# Patient Record
Sex: MALE | Race: WHITE | NOT HISPANIC OR LATINO | Employment: FULL TIME | URBAN - METROPOLITAN AREA
[De-identification: names, ages, dates, MRNs, and addresses within clinical notes are randomized per-mention and may not be internally consistent; named-entity substitution may affect disease eponyms.]

---

## 2017-02-02 ENCOUNTER — OFFICE VISIT (OUTPATIENT)
Dept: INTERNAL MEDICINE | Facility: CLINIC | Age: 57
End: 2017-02-02
Payer: COMMERCIAL

## 2017-02-02 ENCOUNTER — LAB VISIT (OUTPATIENT)
Dept: LAB | Facility: HOSPITAL | Age: 57
End: 2017-02-02
Attending: FAMILY MEDICINE
Payer: COMMERCIAL

## 2017-02-02 VITALS
TEMPERATURE: 98 F | HEIGHT: 67 IN | BODY MASS INDEX: 26.37 KG/M2 | WEIGHT: 168 LBS | HEART RATE: 67 BPM | OXYGEN SATURATION: 98 % | SYSTOLIC BLOOD PRESSURE: 140 MMHG | DIASTOLIC BLOOD PRESSURE: 80 MMHG

## 2017-02-02 DIAGNOSIS — I10 UNSPECIFIED ESSENTIAL HYPERTENSION: ICD-10-CM

## 2017-02-02 DIAGNOSIS — I10 HYPERTENSION, ESSENTIAL: Primary | ICD-10-CM

## 2017-02-02 DIAGNOSIS — R97.20 RISING PSA LEVEL: ICD-10-CM

## 2017-02-02 DIAGNOSIS — I10 HYPERTENSION, ESSENTIAL: ICD-10-CM

## 2017-02-02 DIAGNOSIS — F17.200 SMOKER: ICD-10-CM

## 2017-02-02 DIAGNOSIS — E78.5 HYPERLIPIDEMIA, UNSPECIFIED HYPERLIPIDEMIA TYPE: ICD-10-CM

## 2017-02-02 DIAGNOSIS — Z12.5 PROSTATE CANCER SCREENING: ICD-10-CM

## 2017-02-02 LAB
ALBUMIN SERPL BCP-MCNC: 3.9 G/DL
ALP SERPL-CCNC: 86 U/L
ALT SERPL W/O P-5'-P-CCNC: 23 U/L
ANION GAP SERPL CALC-SCNC: 8 MMOL/L
AST SERPL-CCNC: 21 U/L
BILIRUB SERPL-MCNC: 1.9 MG/DL
BUN SERPL-MCNC: 13 MG/DL
CALCIUM SERPL-MCNC: 9.2 MG/DL
CHLORIDE SERPL-SCNC: 102 MMOL/L
CHOLEST/HDLC SERPL: 1.8 {RATIO}
CO2 SERPL-SCNC: 26 MMOL/L
COMPLEXED PSA SERPL-MCNC: 0.83 NG/ML
CREAT SERPL-MCNC: 0.9 MG/DL
EST. GFR  (AFRICAN AMERICAN): >60 ML/MIN/1.73 M^2
EST. GFR  (NON AFRICAN AMERICAN): >60 ML/MIN/1.73 M^2
GLUCOSE SERPL-MCNC: 83 MG/DL
HDL/CHOLESTEROL RATIO: 56.3 %
HDLC SERPL-MCNC: 142 MG/DL
HDLC SERPL-MCNC: 80 MG/DL
LDLC SERPL CALC-MCNC: 55 MG/DL
NONHDLC SERPL-MCNC: 62 MG/DL
POTASSIUM SERPL-SCNC: 4.3 MMOL/L
PROT SERPL-MCNC: 6.9 G/DL
SODIUM SERPL-SCNC: 136 MMOL/L
TRIGL SERPL-MCNC: 35 MG/DL

## 2017-02-02 PROCEDURE — 80061 LIPID PANEL: CPT

## 2017-02-02 PROCEDURE — 99214 OFFICE O/P EST MOD 30 MIN: CPT | Mod: S$GLB,,, | Performed by: FAMILY MEDICINE

## 2017-02-02 PROCEDURE — 84153 ASSAY OF PSA TOTAL: CPT

## 2017-02-02 PROCEDURE — 3077F SYST BP >= 140 MM HG: CPT | Mod: S$GLB,,, | Performed by: FAMILY MEDICINE

## 2017-02-02 PROCEDURE — 80053 COMPREHEN METABOLIC PANEL: CPT

## 2017-02-02 PROCEDURE — 36415 COLL VENOUS BLD VENIPUNCTURE: CPT

## 2017-02-02 PROCEDURE — 3079F DIAST BP 80-89 MM HG: CPT | Mod: S$GLB,,, | Performed by: FAMILY MEDICINE

## 2017-02-02 PROCEDURE — 99999 PR PBB SHADOW E&M-EST. PATIENT-LVL III: CPT | Mod: PBBFAC,,, | Performed by: FAMILY MEDICINE

## 2017-02-02 RX ORDER — HYDROCHLOROTHIAZIDE 25 MG/1
25 TABLET ORAL DAILY
Qty: 90 TABLET | Refills: 1 | Status: SHIPPED | OUTPATIENT
Start: 2017-02-02 | End: 2017-04-25 | Stop reason: SDUPTHER

## 2017-02-02 RX ORDER — AMLODIPINE AND BENAZEPRIL HYDROCHLORIDE 5; 20 MG/1; MG/1
2 CAPSULE ORAL DAILY
Qty: 180 CAPSULE | Refills: 3 | Status: SHIPPED | OUTPATIENT
Start: 2017-02-02 | End: 2017-02-06

## 2017-02-02 RX ORDER — HYDROCHLOROTHIAZIDE 12.5 MG/1
12.5 CAPSULE ORAL DAILY
COMMUNITY
End: 2017-02-02

## 2017-02-02 NOTE — MR AVS SNAPSHOT
[facility]  [address]  [place] [address]  Phone: [phone]  Fax: [phone]                  Kip Lauren   2017 4:00 PM   Office Visit    Description:  Male : 1960   Provider:  Romulo Rees MD   Department:  [facility]           Reason for Visit     Follow-up           Diagnoses this Visit        Comments    Hypertension, essential    -  Primary     Hyperlipidemia, unspecified hyperlipidemia type         Prostate cancer screening         Rising PSA level         Smoker         Unspecified essential hypertension                To Do List           Goals (5 Years of Data)     None      Follow-Up and Disposition     Return in about 1 month (around 3/2/2017) for lab review (after future labs), Blood Pressure recheck.       These Medications        Disp Refills Start End    amlodipine-benazepril 5-20 mg (LOTREL) 5-20 mg per capsule 180 capsule 3 2017     Take 2 capsules by mouth once daily. - Oral    Pharmacy: [facility] [address] [place] [address] [place] - [address] Ph #: [phone]       hydrochlorothiazide (HYDRODIURIL) 25 MG tablet 90 tablet 1 2017     Take 1 tablet (25 mg total) by mouth once daily. - Oral    Pharmacy: [facility] [address] [place] [address] [place] - [address] [facility] Ph #: [phone]         [facility]     [facility] On Call Nurse Care Line -  Assistance  Registered nurses in the [facility] On Call Center provide clinical advisement, health education, appointment booking, and other advisory services.  Call for this free service at [phone].             Medications           Message regarding Medications     Verify the changes and/or additions to your medication regime listed below are the same as discussed with your clinician today.  If any of these changes or additions are incorrect, please notify your healthcare provider.        START taking these NEW medications        Refills     "hydrochlorothiazide (HYDRODIURIL) 25 MG tablet 1    Sig: Take 1 tablet (25 mg total) by mouth once daily.    Class: Normal    Route: Oral      STOP taking these medications     hydrochlorothiazide (MICROZIDE) 12.5 mg capsule Take 12.5 mg by mouth once daily.           Verify that the below list of medications is an accurate representation of the medications you are currently taking.  If none reported, the list may be blank. If incorrect, please contact your healthcare provider. Carry this list with you in case of emergency.           Current Medications     amlodipine-benazepril 5-20 mg (LOTREL) 5-20 mg per capsule Take 2 capsules by mouth once daily.    atorvastatin (LIPITOR) 10 MG tablet Take 1 tablet (10 mg total) by mouth once daily.    hydrochlorothiazide (HYDRODIURIL) 25 MG tablet Take 1 tablet (25 mg total) by mouth once daily.           Clinical Reference Information           Your Vitals Were     BP Pulse Temp Height Weight SpO2    140/80 67 98.2 °F (36.8 °C) 5' 7" (1.702 m) 76.2 kg (167 lb 15.9 oz) 98%    BMI                26.31 kg/m2          Blood Pressure          Most Recent Value    BP  (!)  140/80      Allergies as of 2/2/2017     No Known Allergies      Immunizations Administered on Date of Encounter - 2/2/2017     None      Orders Placed During Today's Visit      Normal Orders This Visit    Ambulatory referral to Smoking Cessation Program     Future Labs/Procedures Expected by Expires    Comprehensive metabolic panel  2/2/2017 2/2/2018    Lipid panel  2/2/2017 2/2/2018    PSA, Screening  2/2/2017 2/2/2018      Smoking Cessation     If you would like to quit smoking:   You may be eligible for free services if you are a [place] resident and started smoking cigarettes before September 1, 1988.  Call the Smoking Cessation Trust (SCT) toll free at (645) 150-0862 or (362) 048-8382.   Call 3-800-QUIT-NOW if you do not meet the above criteria.            Language Assistance Services     ATTENTION: " Language assistance services are available, free of charge. Please call [phone].      ATENCIÓN: Si habla andrewsañol, tiene a lassiter disposición servicios gratuitos de asistencia lingüística. Llame al [phone].     CHÚ Ý: N?u b?n nói Ti?ng Vi?t, có các d?ch v? h? tr? ngôn ng? mi?n phí dành cho b?n. G?i s? [phone].         [facility] complies with applicable Federal civil rights laws and does not discriminate on the basis of race, color, national origin, age, disability, or sex.

## 2017-02-02 NOTE — PROGRESS NOTES
Subjective:       Patient ID: Kip Lauren is a 56 y.o. male.    Chief Complaint: Follow-up    HPI  Review of Systems   Constitutional: Negative for chills, fatigue and fever.   HENT: Negative for congestion and trouble swallowing.    Eyes: Negative for redness.   Respiratory: Negative for cough, chest tightness and shortness of breath.    Cardiovascular: Negative for chest pain, palpitations and leg swelling.   Gastrointestinal: Negative for abdominal pain and blood in stool.   Genitourinary: Negative for hematuria.   Musculoskeletal: Negative for arthralgias, back pain, gait problem, joint swelling, myalgias and neck pain.   Skin: Negative for color change and rash.   Neurological: Positive for headaches. Negative for tremors, speech difficulty, weakness and numbness.   Hematological: Negative for adenopathy. Does not bruise/bleed easily.   Psychiatric/Behavioral: Negative for behavioral problems, confusion and sleep disturbance. The patient is not nervous/anxious.        Objective:      Physical Exam   Constitutional: He is oriented to person, place, and time. He appears well-developed and well-nourished.   Eyes: No scleral icterus.   Neck: Normal range of motion. Neck supple. No JVD present. Carotid bruit is not present. No tracheal deviation present. No thyromegaly present.   Cardiovascular: Normal rate, regular rhythm, normal heart sounds and intact distal pulses.  Exam reveals no gallop and no friction rub.    No murmur heard.  Pulmonary/Chest: Effort normal. No respiratory distress. He has decreased breath sounds. He has no wheezes. He has no rales.   Abdominal: Soft. Bowel sounds are normal. He exhibits no distension and no mass. There is no tenderness. There is no rebound and no guarding.   Musculoskeletal: He exhibits no edema.   Lymphadenopathy:     He has no cervical adenopathy.   Neurological: He is alert and oriented to person, place, and time. He displays no tremor. No cranial nerve deficit.  Coordination and gait normal.   Skin: Skin is warm and dry. No rash noted. He is not diaphoretic. No erythema.   Psychiatric: He has a normal mood and affect. His behavior is normal. Judgment and thought content normal.   Nursing note and vitals reviewed.      Assessment:       1. Hypertension, essential    2. Hyperlipidemia, unspecified hyperlipidemia type    3. Prostate cancer screening    4. Rising PSA level    5. Smoker    6. Unspecified essential hypertension        Plan:   Kip was seen today for follow-up.    Diagnoses and all orders for this visit:    Hypertension, essential  -     Comprehensive metabolic panel; Future    Hyperlipidemia, unspecified hyperlipidemia type  -     Lipid panel; Future    Prostate cancer screening  -     PSA, Screening; Future    Rising PSA level  -     PSA, Screening; Future    Smoker  -     Ambulatory referral to Smoking Cessation Program    Unspecified essential hypertension  -     amlodipine-benazepril 5-20 mg (LOTREL) 5-20 mg per capsule; Take 2 capsules by mouth once daily.    Other orders  -     hydrochlorothiazide (HYDRODIURIL) 25 MG tablet; Take 1 tablet (25 mg total) by mouth once daily.      See meds, orders, follow up, routing and instructions sections of encounter.  A 56-year-old blood pressure followup.    He is lost to follow up.  His insurance changed.  He has not seen us in a year.    He likewise did not followup concerning his elevated PSA after seeing the   urologist he was referred to last year.    His new provider placed him on amlodipine/benazepril 5/40.  He developed   headaches.  He did not tolerate the medication well.    We will return him to his prior medications with us.  Smoking cessation.    Recheck PSA.  Consider Urology consult.  Follow up with me in a few weeks for BP   recheck.  The patient agreed to come back.      MARKELL/MARU  dd: 02/02/2017 18:06:01 (CST)  td: 02/03/2017 03:56:53 (CST)  Doc ID   #[id number]  Job ID #[id number]    CC:

## 2017-02-06 ENCOUNTER — TELEPHONE (OUTPATIENT)
Dept: INTERNAL MEDICINE | Facility: CLINIC | Age: 57
End: 2017-02-06

## 2017-02-06 RX ORDER — AMLODIPINE BESYLATE 10 MG/1
10 TABLET ORAL DAILY
Qty: 90 TABLET | Refills: 0 | Status: SHIPPED | OUTPATIENT
Start: 2017-02-06 | End: 2017-04-25 | Stop reason: SDUPTHER

## 2017-02-06 RX ORDER — BENAZEPRIL HYDROCHLORIDE 40 MG/1
40 TABLET ORAL DAILY
Qty: 90 TABLET | Refills: 0 | Status: SHIPPED | OUTPATIENT
Start: 2017-02-06 | End: 2017-04-25 | Stop reason: SDUPTHER

## 2017-02-06 NOTE — TELEPHONE ENCOUNTER
----- Message from Yana Gillis sent at 2/6/2017  8:12 AM CST -----  Contact: Mrs. Lauren   The pharmacy will not fill the amlodipine-benazepril 5/20, 2 cap b.i.d. Until the doctor sends an explantation to the insurance stating why he is being changed from the 5/20 1 q.d. To 5/20 2 cap b.i.d.     You can reach Mrs. Lauren at [phone].    Thanks!

## 2017-02-06 NOTE — TELEPHONE ENCOUNTER
Spoke with Hawa at the pharmacy advised per Dr. Rees's notes to DC AMLODIPINE/BENAZEPRIL COMBO.  Change to Amlodipine 10mg QD and Benazepril 40mg QD #30 1 refill on each.

## 2017-04-20 ENCOUNTER — TELEPHONE (OUTPATIENT)
Dept: INTERNAL MEDICINE | Facility: CLINIC | Age: 57
End: 2017-04-20

## 2017-04-20 DIAGNOSIS — I10 HYPERTENSION, ESSENTIAL: ICD-10-CM

## 2017-04-20 DIAGNOSIS — Z00.00 ANNUAL PHYSICAL EXAM: Primary | ICD-10-CM

## 2017-04-20 DIAGNOSIS — E78.5 HYPERLIPIDEMIA, UNSPECIFIED HYPERLIPIDEMIA TYPE: ICD-10-CM

## 2017-04-20 DIAGNOSIS — Z12.5 PROSTATE CANCER SCREENING: ICD-10-CM

## 2017-04-20 NOTE — TELEPHONE ENCOUNTER
----- Message from Tete Brand sent at 4/20/2017  9:15 AM CDT -----  Pt missed his appt for his labs in February.  Would like to reschedule them.  Please call

## 2017-04-25 ENCOUNTER — OFFICE VISIT (OUTPATIENT)
Dept: INTERNAL MEDICINE | Facility: CLINIC | Age: 57
End: 2017-04-25
Payer: COMMERCIAL

## 2017-04-25 VITALS
OXYGEN SATURATION: 98 % | WEIGHT: 167.56 LBS | BODY MASS INDEX: 26.3 KG/M2 | SYSTOLIC BLOOD PRESSURE: 120 MMHG | HEART RATE: 85 BPM | HEIGHT: 67 IN | DIASTOLIC BLOOD PRESSURE: 72 MMHG

## 2017-04-25 DIAGNOSIS — I10 HYPERTENSION, ESSENTIAL: Primary | ICD-10-CM

## 2017-04-25 DIAGNOSIS — F17.200 SMOKER: ICD-10-CM

## 2017-04-25 DIAGNOSIS — E78.5 HYPERLIPIDEMIA, UNSPECIFIED HYPERLIPIDEMIA TYPE: ICD-10-CM

## 2017-04-25 DIAGNOSIS — K63.5 POLYP OF COLON, UNSPECIFIED PART OF COLON, UNSPECIFIED TYPE: ICD-10-CM

## 2017-04-25 PROCEDURE — 1160F RVW MEDS BY RX/DR IN RCRD: CPT | Mod: S$GLB,,, | Performed by: FAMILY MEDICINE

## 2017-04-25 PROCEDURE — 99214 OFFICE O/P EST MOD 30 MIN: CPT | Mod: S$GLB,,, | Performed by: FAMILY MEDICINE

## 2017-04-25 PROCEDURE — 3078F DIAST BP <80 MM HG: CPT | Mod: S$GLB,,, | Performed by: FAMILY MEDICINE

## 2017-04-25 PROCEDURE — 3074F SYST BP LT 130 MM HG: CPT | Mod: S$GLB,,, | Performed by: FAMILY MEDICINE

## 2017-04-25 PROCEDURE — 99999 PR PBB SHADOW E&M-EST. PATIENT-LVL III: CPT | Mod: PBBFAC,,, | Performed by: FAMILY MEDICINE

## 2017-04-25 RX ORDER — ATORVASTATIN CALCIUM 10 MG/1
10 TABLET, FILM COATED ORAL DAILY
Qty: 90 TABLET | Refills: 3 | Status: SHIPPED | OUTPATIENT
Start: 2017-04-25 | End: 2017-11-08 | Stop reason: SDUPTHER

## 2017-04-25 RX ORDER — HYDROCHLOROTHIAZIDE 25 MG/1
25 TABLET ORAL DAILY
Qty: 90 TABLET | Refills: 3 | Status: SHIPPED | OUTPATIENT
Start: 2017-04-25 | End: 2017-11-08 | Stop reason: SDUPTHER

## 2017-04-25 RX ORDER — BENAZEPRIL HYDROCHLORIDE 40 MG/1
40 TABLET ORAL DAILY
Qty: 90 TABLET | Refills: 3 | Status: SHIPPED | OUTPATIENT
Start: 2017-04-25 | End: 2017-11-08 | Stop reason: SDUPTHER

## 2017-04-25 RX ORDER — AMLODIPINE BESYLATE 10 MG/1
10 TABLET ORAL DAILY
Qty: 90 TABLET | Refills: 3 | Status: SHIPPED | OUTPATIENT
Start: 2017-04-25 | End: 2017-11-08 | Stop reason: SDUPTHER

## 2017-04-25 NOTE — MR AVS SNAPSHOT
[facility]  [address]  [facility] [address]  Phone: [phone]  Fax: [phone]                  Kip Lauren   2017 10:40 AM   Office Visit    Description:  Male : 1960   Provider:  Romulo Rees MD   Department:  [facility] - Internal Medicine           Reason for Visit     Follow-up           Diagnoses this Visit        Comments    Hypertension, essential    -  Primary     Hyperlipidemia, unspecified hyperlipidemia type         Smoker         Polyp of colon, unspecified part of colon, unspecified type                To Do List           Goals (5 Years of Data)     None      Follow-Up and Disposition     Return in about 9 months (around 2018) for annual physical exam, Call or message through Portal for status update..       These Medications        Disp Refills Start End    amlodipine (NORVASC) 10 MG tablet 90 tablet 3 2017     Take 1 tablet (10 mg total) by mouth once daily. - Oral    Pharmacy: [facility] [address] [place] [address] [address] Ph #: [phone]       atorvastatin (LIPITOR) 10 MG tablet 90 tablet 3 2017     Take 1 tablet (10 mg total) by mouth once daily. - Oral    Pharmacy: [facility] [address] [place] [address] [place] - [address] [facility] Ph #: [phone]       benazepril (LOTENSIN) 40 MG tablet 90 tablet 3 2017     Take 1 tablet (40 mg total) by mouth once daily. - Oral    Pharmacy: [facility] [address] [place] [address] [place] - [address] [place] [address] Ph #: [phone]       hydrochlorothiazide (HYDRODIURIL) 25 MG tablet 90 tablet 3 2017     Take 1 tablet (25 mg total) by mouth once daily. - Oral    Pharmacy: [address] [place] - [address] [facility] Ph #: [phone]         [facility]     [facility] On Call Nurse Care Line - 24/ Assistance  Unless otherwise directed by your provider, please contact [facility], our nurse care line that is available for 24/7 assistance.  "    Registered nurses in the [facility] On Call Center provide: appointment scheduling, clinical advisement, health education, and other advisory services.  Call: [phone] (toll free)               Medications           Message regarding Medications     Verify the changes and/or additions to your medication regime listed below are the same as discussed with your clinician today.  If any of these changes or additions are incorrect, please notify your healthcare provider.             Verify that the below list of medications is an accurate representation of the medications you are currently taking.  If none reported, the list may be blank. If incorrect, please contact your healthcare provider. Carry this list with you in case of emergency.           Current Medications     amlodipine (NORVASC) 10 MG tablet Take 1 tablet (10 mg total) by mouth once daily.    atorvastatin (LIPITOR) 10 MG tablet Take 1 tablet (10 mg total) by mouth once daily.    benazepril (LOTENSIN) 40 MG tablet Take 1 tablet (40 mg total) by mouth once daily.    hydrochlorothiazide (HYDRODIURIL) 25 MG tablet Take 1 tablet (25 mg total) by mouth once daily.           Clinical Reference Information           Your Vitals Were     BP Pulse Height Weight SpO2 BMI    120/72 (BP Location: Right arm, Patient Position: Sitting, BP Method: Manual) 85 5' 6.5" (1.689 m) 76 kg (167 lb 8.8 oz) 98% 26.64 kg/m2      Blood Pressure          Most Recent Value    BP  120/72      Allergies as of 4/25/2017     No Known Allergies      Immunizations Administered on Date of Encounter - 4/25/2017     None      Smoking Cessation     If you would like to quit smoking:   You may be eligible for free services if you are a [place] resident and started smoking cigarettes before September 1, 1988.  Call the Smoking Cessation Trust (SCT) toll free at (804) 619-6268 or (567) 677-2950.   Call 1-800-QUIT-NOW if you do not meet the above criteria.   Contact us via email: " [email or web address]   View our website for more information: [email or web address]        Language Assistance Services     ATTENTION: Language assistance services are available, free of charge. Please call [phone].      ATENCIÓN: Si habla huang, tiene a lassiter disposición servicios gratuitos de asistencia lingüística. Llame al [phone].     CHÚ Ý: N?u b?n nói Ti?ng Vi?t, có các d?ch v? h? tr? ngôn ng? mi?n phí dành cho b?n. G?i s? [phone].         [facility] complies with applicable Federal civil rights laws and does not discriminate on the basis of race, color, national origin, age, disability, or sex.

## 2017-04-25 NOTE — PROGRESS NOTES
Subjective:       Patient ID: Kip Lauren is a 56 y.o. male.    Chief Complaint: Follow-up (B/P)    HPI  Review of Systems   Constitutional: Negative for chills, fatigue and fever.   HENT: Negative for congestion and trouble swallowing.    Eyes: Negative for redness.   Respiratory: Negative for cough, chest tightness and shortness of breath.    Cardiovascular: Negative for chest pain, palpitations and leg swelling.   Gastrointestinal: Negative for abdominal pain and blood in stool.   Genitourinary: Negative for hematuria.   Musculoskeletal: Negative for arthralgias, back pain, gait problem, joint swelling, myalgias and neck pain.   Skin: Negative for color change and rash.   Neurological: Negative for tremors, speech difficulty, weakness, numbness and headaches.   Hematological: Negative for adenopathy. Does not bruise/bleed easily.   Psychiatric/Behavioral: Negative for behavioral problems, confusion and sleep disturbance. The patient is not nervous/anxious.        Objective:      Physical Exam   Constitutional: He is oriented to person, place, and time. He appears well-developed and well-nourished. No distress.   Neck: Neck supple.   Pulmonary/Chest: Effort normal.   Musculoskeletal: He exhibits no edema.        Right lower leg: He exhibits no edema.        Left lower leg: He exhibits no edema.   Neurological: He is alert and oriented to person, place, and time.   Skin: Skin is warm and dry. No rash noted.   Psychiatric: He has a normal mood and affect. His behavior is normal. Judgment and thought content normal.   Nursing note and vitals reviewed.      Assessment:       1. Hypertension, essential    2. Hyperlipidemia, unspecified hyperlipidemia type    3. Smoker    4. Polyp of colon, unspecified part of colon, unspecified type        Plan:   Kip was seen today for follow-up.    Diagnoses and all orders for this visit:    Hypertension, essential    Hyperlipidemia, unspecified hyperlipidemia  type    Smoker    Polyp of colon, unspecified part of colon, unspecified type    Other orders  -     amlodipine (NORVASC) 10 MG tablet; Take 1 tablet (10 mg total) by mouth once daily.  -     atorvastatin (LIPITOR) 10 MG tablet; Take 1 tablet (10 mg total) by mouth once daily.  -     benazepril (LOTENSIN) 40 MG tablet; Take 1 tablet (40 mg total) by mouth once daily.  -     hydrochlorothiazide (HYDRODIURIL) 25 MG tablet; Take 1 tablet (25 mg total) by mouth once daily.      See meds, orders, follow up, routing and instructions sections of encounter.  The patient is in for hypertension followup.  We changed his medications as   indicated in my prior note, approximately two months ago.  His laboratory was   reviewed and within normal limits.  Diet and exercise was discussed at his last   visit, I did emphasize smoking cessation at this visit, the patient states he is   not currently ready to quit.    Follow up in nine months.  I did review some of his health maintenance.  He   should be due for a colonoscopy within the next year or so based on a history of   polyps.      MARKELL/MARU  dd: 04/25/2017 11:36:55 (CDT)  td: 04/26/2017 05:01:02 (CDT)  Doc ID   #[id number]  Job ID #[id number]    CC:

## 2017-11-08 ENCOUNTER — OFFICE VISIT (OUTPATIENT)
Dept: INTERNAL MEDICINE | Facility: CLINIC | Age: 57
End: 2017-11-08
Attending: FAMILY MEDICINE
Payer: COMMERCIAL

## 2017-11-08 ENCOUNTER — LAB VISIT (OUTPATIENT)
Dept: LAB | Facility: HOSPITAL | Age: 57
End: 2017-11-08
Attending: FAMILY MEDICINE
Payer: COMMERCIAL

## 2017-11-08 ENCOUNTER — IMMUNIZATION (OUTPATIENT)
Dept: INTERNAL MEDICINE | Facility: CLINIC | Age: 57
End: 2017-11-08
Payer: COMMERCIAL

## 2017-11-08 VITALS
DIASTOLIC BLOOD PRESSURE: 80 MMHG | SYSTOLIC BLOOD PRESSURE: 118 MMHG | WEIGHT: 157.06 LBS | BODY MASS INDEX: 24.65 KG/M2 | HEIGHT: 67 IN

## 2017-11-08 DIAGNOSIS — E78.5 HYPERLIPIDEMIA, UNSPECIFIED HYPERLIPIDEMIA TYPE: ICD-10-CM

## 2017-11-08 DIAGNOSIS — K63.5 POLYP OF COLON, UNSPECIFIED PART OF COLON, UNSPECIFIED TYPE: ICD-10-CM

## 2017-11-08 DIAGNOSIS — I10 HYPERTENSION, ESSENTIAL: ICD-10-CM

## 2017-11-08 DIAGNOSIS — F17.200 SMOKER: ICD-10-CM

## 2017-11-08 DIAGNOSIS — I25.10 ASCVD (ARTERIOSCLEROTIC CARDIOVASCULAR DISEASE): ICD-10-CM

## 2017-11-08 DIAGNOSIS — Z12.11 COLON CANCER SCREENING: ICD-10-CM

## 2017-11-08 DIAGNOSIS — R63.4 WEIGHT LOSS: ICD-10-CM

## 2017-11-08 DIAGNOSIS — Z00.00 ANNUAL PHYSICAL EXAM: ICD-10-CM

## 2017-11-08 DIAGNOSIS — L98.9 SKIN LESION: ICD-10-CM

## 2017-11-08 DIAGNOSIS — Z00.00 ANNUAL PHYSICAL EXAM: Primary | ICD-10-CM

## 2017-11-08 LAB
ALBUMIN SERPL BCP-MCNC: 4.1 G/DL
ALP SERPL-CCNC: 71 U/L
ALT SERPL W/O P-5'-P-CCNC: 28 U/L
ANION GAP SERPL CALC-SCNC: 8 MMOL/L
AST SERPL-CCNC: 23 U/L
BACTERIA #/AREA URNS AUTO: NORMAL /HPF
BILIRUB SERPL-MCNC: 1.3 MG/DL
BILIRUB UR QL STRIP: NEGATIVE
BUN SERPL-MCNC: 11 MG/DL
CALCIUM SERPL-MCNC: 9.8 MG/DL
CHLORIDE SERPL-SCNC: 101 MMOL/L
CHOLEST SERPL-MCNC: 167 MG/DL
CHOLEST/HDLC SERPL: 1.8 {RATIO}
CLARITY UR REFRACT.AUTO: CLEAR
CO2 SERPL-SCNC: 30 MMOL/L
COLOR UR AUTO: YELLOW
CREAT SERPL-MCNC: 0.7 MG/DL
ERYTHROCYTE [DISTWIDTH] IN BLOOD BY AUTOMATED COUNT: 13.6 %
EST. GFR  (AFRICAN AMERICAN): >60 ML/MIN/1.73 M^2
EST. GFR  (NON AFRICAN AMERICAN): >60 ML/MIN/1.73 M^2
GLUCOSE SERPL-MCNC: 101 MG/DL
GLUCOSE UR QL STRIP: NEGATIVE
HCT VFR BLD AUTO: 47.2 %
HDLC SERPL-MCNC: 92 MG/DL
HDLC SERPL: 55.1 %
HGB BLD-MCNC: 16 G/DL
HGB UR QL STRIP: NEGATIVE
KETONES UR QL STRIP: NEGATIVE
LDLC SERPL CALC-MCNC: 67.4 MG/DL
LEUKOCYTE ESTERASE UR QL STRIP: NEGATIVE
MCH RBC QN AUTO: 32.1 PG
MCHC RBC AUTO-ENTMCNC: 33.9 G/DL
MCV RBC AUTO: 95 FL
MICROSCOPIC COMMENT: NORMAL
NITRITE UR QL STRIP: NEGATIVE
NONHDLC SERPL-MCNC: 75 MG/DL
PH UR STRIP: 6 [PH] (ref 5–8)
PLATELET # BLD AUTO: 257 K/UL
PMV BLD AUTO: 9.4 FL
POTASSIUM SERPL-SCNC: 4.2 MMOL/L
PROT SERPL-MCNC: 7.5 G/DL
PROT UR QL STRIP: NEGATIVE
RBC # BLD AUTO: 4.98 M/UL
SODIUM SERPL-SCNC: 139 MMOL/L
SP GR UR STRIP: 1.01 (ref 1–1.03)
TRIGL SERPL-MCNC: 38 MG/DL
TSH SERPL DL<=0.005 MIU/L-ACNC: 1.17 UIU/ML
URN SPEC COLLECT METH UR: NORMAL
UROBILINOGEN UR STRIP-ACNC: NEGATIVE EU/DL
WBC # BLD AUTO: 7.18 K/UL

## 2017-11-08 PROCEDURE — 85027 COMPLETE CBC AUTOMATED: CPT

## 2017-11-08 PROCEDURE — 80053 COMPREHEN METABOLIC PANEL: CPT

## 2017-11-08 PROCEDURE — 90471 IMMUNIZATION ADMIN: CPT | Mod: S$GLB,,, | Performed by: FAMILY MEDICINE

## 2017-11-08 PROCEDURE — 99396 PREV VISIT EST AGE 40-64: CPT | Mod: 25,S$GLB,, | Performed by: FAMILY MEDICINE

## 2017-11-08 PROCEDURE — 99999 PR PBB SHADOW E&M-EST. PATIENT-LVL III: CPT | Mod: PBBFAC,,, | Performed by: FAMILY MEDICINE

## 2017-11-08 PROCEDURE — 36415 COLL VENOUS BLD VENIPUNCTURE: CPT

## 2017-11-08 PROCEDURE — 80061 LIPID PANEL: CPT

## 2017-11-08 PROCEDURE — 84443 ASSAY THYROID STIM HORMONE: CPT

## 2017-11-08 PROCEDURE — 81001 URINALYSIS AUTO W/SCOPE: CPT

## 2017-11-08 PROCEDURE — 90686 IIV4 VACC NO PRSV 0.5 ML IM: CPT | Mod: S$GLB,,, | Performed by: FAMILY MEDICINE

## 2017-11-08 RX ORDER — BENAZEPRIL HYDROCHLORIDE 40 MG/1
40 TABLET ORAL DAILY
Qty: 90 TABLET | Refills: 3 | Status: SHIPPED | OUTPATIENT
Start: 2017-11-08 | End: 2018-11-14 | Stop reason: SDUPTHER

## 2017-11-08 RX ORDER — ATORVASTATIN CALCIUM 10 MG/1
10 TABLET, FILM COATED ORAL DAILY
Qty: 90 TABLET | Refills: 3 | Status: SHIPPED | OUTPATIENT
Start: 2017-11-08 | End: 2018-11-14 | Stop reason: SDUPTHER

## 2017-11-08 RX ORDER — AMLODIPINE BESYLATE 10 MG/1
10 TABLET ORAL DAILY
Qty: 90 TABLET | Refills: 3 | Status: SHIPPED | OUTPATIENT
Start: 2017-11-08 | End: 2018-11-14 | Stop reason: SDUPTHER

## 2017-11-08 RX ORDER — HYDROCHLOROTHIAZIDE 25 MG/1
25 TABLET ORAL DAILY
Qty: 90 TABLET | Refills: 3 | Status: SHIPPED | OUTPATIENT
Start: 2017-11-08 | End: 2018-11-14 | Stop reason: SDUPTHER

## 2017-11-08 NOTE — PROGRESS NOTES
Subjective:       Patient ID: Kip Lauren is a 56 y.o. male.    Chief Complaint: Annual Exam    Established patient for an annual wellness check/physical exam. No specific complaints, please see ROS.        Hypertension   This is a chronic problem. The current episode started more than 1 year ago. The problem is unchanged. The problem is controlled. Pertinent negatives include no anxiety, blurred vision, chest pain, headaches, malaise/fatigue, neck pain, orthopnea, palpitations, peripheral edema, PND, shortness of breath or sweats. There are no associated agents to hypertension. Risk factors for coronary artery disease include family history and smoking/tobacco exposure. Past treatments include nothing. The current treatment provides no improvement. There are no compliance problems.      Review of Systems   Constitutional: Positive for unexpected weight change. Negative for chills, fatigue, fever and malaise/fatigue.   HENT: Negative for congestion and trouble swallowing.    Eyes: Negative for blurred vision and redness.   Respiratory: Negative for cough, chest tightness and shortness of breath.    Cardiovascular: Negative for chest pain, palpitations, orthopnea, leg swelling and PND.   Gastrointestinal: Negative for abdominal pain and blood in stool.   Genitourinary: Negative for hematuria.   Musculoskeletal: Negative for arthralgias, back pain, gait problem, joint swelling, myalgias and neck pain.   Skin: Negative for color change and rash.   Neurological: Negative for tremors, speech difficulty, weakness, numbness and headaches.   Hematological: Negative for adenopathy. Does not bruise/bleed easily.   Psychiatric/Behavioral: Negative for behavioral problems, confusion and sleep disturbance. The patient is not nervous/anxious.        Objective:      Physical Exam   Constitutional: He appears well-developed and well-nourished.   HENT:   Head: Normocephalic.   Right Ear: Tympanic membrane, external ear and  ear canal normal.   Left Ear: Tympanic membrane, external ear and ear canal normal.   Mouth/Throat: Oropharynx is clear and moist.   Eyes: Pupils are equal, round, and reactive to light.   Neck: Normal range of motion. Neck supple. Carotid bruit is not present. No thyromegaly present.   Cardiovascular: Normal rate, regular rhythm, normal heart sounds and intact distal pulses.  Exam reveals no gallop and no friction rub.    No murmur heard.  Pulmonary/Chest: Effort normal and breath sounds normal.   Abdominal: Soft. Bowel sounds are normal. He exhibits no distension and no mass. There is no tenderness. There is no rebound and no guarding.   Musculoskeletal: Normal range of motion. He exhibits no edema or tenderness.   Lymphadenopathy:     He has no cervical adenopathy.   Neurological: He is alert. He has normal strength. He displays normal reflexes. No cranial nerve deficit or sensory deficit. Coordination and gait normal.   Skin: Skin is warm and dry.   Psychiatric: He has a normal mood and affect. His behavior is normal. Judgment and thought content normal.   Nursing note and vitals reviewed.      Assessment:       1. Annual physical exam    2. Hypertension, essential    3. Hyperlipidemia, unspecified hyperlipidemia type    4. Colon cancer screening    5. Polyp of colon, unspecified part of colon, unspecified type    6. Smoker    7. Skin lesion    8. ASCVD (arteriosclerotic cardiovascular disease)    9. Weight loss        Plan:   Kip was seen today for annual exam.    Diagnoses and all orders for this visit:    Annual physical exam  -     Comprehensive metabolic panel; Future  -     Lipid panel; Future  -     CBC Without Differential; Future  -     TSH; Future  -     Quantiferon Gold TB; Future  -     Urinalysis  -     Urinalysis Microscopic    Hypertension, essential  -     Comprehensive metabolic panel; Future    Hyperlipidemia, unspecified hyperlipidemia type  -     Lipid panel; Future    Colon cancer  screening  -     Case request GI: COLONOSCOPY    Polyp of colon, unspecified part of colon, unspecified type  -     Case request GI: COLONOSCOPY    Smoker    Skin lesion  Comments:  L arm/shoulder  Orders:  -     Ambulatory referral to Dermatology    ASCVD (arteriosclerotic cardiovascular disease)  -     Exercise stress echo; Future  -     EKG 12-LEAD - Muse; Future    Weight loss  -     CBC Without Differential; Future  -     TSH; Future  -     Quantiferon Gold TB; Future  -     US Abdomen Complete; Future  -     Urinalysis  -     Urinalysis Microscopic    Other orders  -     amLODIPine (NORVASC) 10 MG tablet; Take 1 tablet (10 mg total) by mouth once daily.  -     atorvastatin (LIPITOR) 10 MG tablet; Take 1 tablet (10 mg total) by mouth once daily.  -     benazepril (LOTENSIN) 40 MG tablet; Take 1 tablet (40 mg total) by mouth once daily.  -     hydroCHLOROthiazide (HYDRODIURIL) 25 MG tablet; Take 1 tablet (25 mg total) by mouth once daily.      See meds, orders, follow up, routing and instructions sections of encounter.  Annual physical examination/six month followup.    The patient did report some weight loss.  I did review the chart 2014, he was   one 173, 2015, 175; 2017, 167; and today 157.  He has no other constitutional   complaints including, but not limited, fever, chills, night sweats, abdominal   discomfort or elimination changes.  The patient has no cardiopulmonary   complaints such as chest pain, dyspnea, diaphoresis, syncope, near syncope or   palpitations.    The patient had a CT screening CAT scan in August 2015 with no pulmonary   findings.  It did reveal some coronary calcification.    Last colonoscopy out of system, patient reported 2013, possible polyps.    RECOMMENDATIONS:  See orders today.  He did have a recent CT of the chest.  We   will add an abdominal ultrasound, urinalysis for additional weight loss, workup,   should this continue, consider Endocrinology or other consult.  He is  probably   due for colonoscopy in the near future.  The patient declines smoking cessation   at this time.  Continue current medications.  The patient is a [employer]   .      MARKELL/MARU  dd: 11/08/2017 10:15:32 (CST)  td: 11/08/2017 23:20:19 (CST)  Doc ID   #[id number]  Job ID #[id number]    CC:

## 2017-11-09 ENCOUNTER — HOSPITAL ENCOUNTER (OUTPATIENT)
Dept: CARDIOLOGY | Facility: CLINIC | Age: 57
Discharge: HOME | End: 2017-11-09
Attending: FAMILY MEDICINE
Payer: COMMERCIAL

## 2017-11-09 DIAGNOSIS — I25.10 ASCVD (ARTERIOSCLEROTIC CARDIOVASCULAR DISEASE): ICD-10-CM

## 2017-11-09 LAB — RETIRED EF AND QEF - SEE NOTES: 60 (ref 55–65)

## 2017-11-09 PROCEDURE — 93321 DOPPLER ECHO F-UP/LMTD STD: CPT | Mod: S$GLB,,, | Performed by: INTERNAL MEDICINE

## 2017-11-09 PROCEDURE — 93351 STRESS TTE COMPLETE: CPT | Mod: S$GLB,,, | Performed by: INTERNAL MEDICINE

## 2017-11-09 PROCEDURE — 93000 ELECTROCARDIOGRAM COMPLETE: CPT | Mod: S$GLB,,, | Performed by: INTERNAL MEDICINE

## 2017-11-14 ENCOUNTER — HOSPITAL ENCOUNTER (OUTPATIENT)
Dept: RADIOLOGY | Facility: HOSPITAL | Age: 57
Discharge: HOME | End: 2017-11-14
Attending: FAMILY MEDICINE
Payer: COMMERCIAL

## 2017-11-14 DIAGNOSIS — R63.4 WEIGHT LOSS: ICD-10-CM

## 2017-11-14 PROCEDURE — 76700 US EXAM ABDOM COMPLETE: CPT | Mod: TC

## 2017-11-14 PROCEDURE — 76700 US EXAM ABDOM COMPLETE: CPT | Mod: 26,,, | Performed by: RADIOLOGY

## 2017-11-15 ENCOUNTER — PATIENT MESSAGE (OUTPATIENT)
Dept: INTERNAL MEDICINE | Facility: CLINIC | Age: 57
End: 2017-11-15

## 2017-11-24 ENCOUNTER — TELEPHONE (OUTPATIENT)
Dept: INTERNAL MEDICINE | Facility: CLINIC | Age: 57
End: 2017-11-24

## 2017-11-27 ENCOUNTER — TELEPHONE (OUTPATIENT)
Dept: INTERNAL MEDICINE | Facility: CLINIC | Age: 57
End: 2017-11-27

## 2017-12-04 ENCOUNTER — TELEPHONE (OUTPATIENT)
Dept: INTERNAL MEDICINE | Facility: CLINIC | Age: 57
End: 2017-12-04

## 2017-12-04 NOTE — TELEPHONE ENCOUNTER
----- Message from Romulo Rees MD sent at 12/4/2017 12:55 PM CST -----  Please schedule patient for a follow up appointment to discuss labs/tests. Thanks.

## 2017-12-04 NOTE — TELEPHONE ENCOUNTER
Repeated attempts to contact patient by telephone were unsuccessful. Letter and Portal note (if 'active') sent to request they schedule a followup appointment.

## 2017-12-06 ENCOUNTER — OFFICE VISIT (OUTPATIENT)
Dept: INTERNAL MEDICINE | Facility: CLINIC | Age: 57
End: 2017-12-06
Attending: FAMILY MEDICINE
Payer: COMMERCIAL

## 2017-12-06 VITALS
DIASTOLIC BLOOD PRESSURE: 75 MMHG | BODY MASS INDEX: 24.92 KG/M2 | SYSTOLIC BLOOD PRESSURE: 120 MMHG | WEIGHT: 158.81 LBS | HEIGHT: 67 IN

## 2017-12-06 DIAGNOSIS — E78.5 HYPERLIPIDEMIA, UNSPECIFIED HYPERLIPIDEMIA TYPE: ICD-10-CM

## 2017-12-06 DIAGNOSIS — R63.4 WEIGHT LOSS: Primary | ICD-10-CM

## 2017-12-06 DIAGNOSIS — I10 HYPERTENSION, ESSENTIAL: ICD-10-CM

## 2017-12-06 DIAGNOSIS — I77.89 ENLARGED THORACIC AORTA: ICD-10-CM

## 2017-12-06 DIAGNOSIS — I25.10 ASCVD (ARTERIOSCLEROTIC CARDIOVASCULAR DISEASE): ICD-10-CM

## 2017-12-06 DIAGNOSIS — F17.200 SMOKER: ICD-10-CM

## 2017-12-06 PROCEDURE — 99213 OFFICE O/P EST LOW 20 MIN: CPT | Mod: S$GLB,,, | Performed by: FAMILY MEDICINE

## 2017-12-06 PROCEDURE — 99999 PR PBB SHADOW E&M-EST. PATIENT-LVL III: CPT | Mod: PBBFAC,,, | Performed by: FAMILY MEDICINE

## 2017-12-06 NOTE — PROGRESS NOTES
Subjective:       Patient ID: Kip Lauren is a 56 y.o. male.    Chief Complaint: Follow-up    HPI  Review of Systems   Constitutional: Positive for unexpected weight change. Negative for activity change, chills, fatigue and fever.   HENT: Negative for congestion, hearing loss, rhinorrhea and trouble swallowing.    Eyes: Negative for discharge, redness and visual disturbance.   Respiratory: Negative for cough, chest tightness, shortness of breath and wheezing.    Cardiovascular: Negative for chest pain, palpitations and leg swelling.   Gastrointestinal: Negative for abdominal pain, blood in stool, constipation, diarrhea and vomiting.   Endocrine: Negative for polydipsia and polyuria.   Genitourinary: Negative for difficulty urinating, hematuria and urgency.   Musculoskeletal: Negative for arthralgias, back pain, gait problem, joint swelling, myalgias and neck pain.   Skin: Negative for color change and rash.   Neurological: Negative for tremors, speech difficulty, weakness, numbness and headaches.   Hematological: Negative for adenopathy. Does not bruise/bleed easily.   Psychiatric/Behavioral: Negative for behavioral problems, confusion, dysphoric mood and sleep disturbance. The patient is not nervous/anxious.        Objective:      Physical Exam   Constitutional: He is oriented to person, place, and time. He appears well-developed and well-nourished. No distress.   Neck: Neck supple.   Pulmonary/Chest: Effort normal.   Musculoskeletal: He exhibits no edema.        Right lower leg: He exhibits no edema.        Left lower leg: He exhibits no edema.   Neurological: He is alert and oriented to person, place, and time.   Skin: Skin is warm and dry. No rash noted.   Psychiatric: He has a normal mood and affect. His behavior is normal. Judgment and thought content normal.   Nursing note and vitals reviewed.      Assessment:       1. Weight loss    2. Enlarged thoracic aorta    3. ASCVD (arteriosclerotic  cardiovascular disease)    4. Hypertension, essential    5. Hyperlipidemia, unspecified hyperlipidemia type    6. Smoker        Plan:   Kip was seen today for follow-up.    Diagnoses and all orders for this visit:    Weight loss    Enlarged thoracic aorta  -     Exercise stress echo; Future  -     EKG 12-LEAD - [facility]; Future    ASCVD (arteriosclerotic cardiovascular disease)  -     Exercise stress echo; Future  -     EKG 12-LEAD - [place]; Future    Hypertension, essential    Hyperlipidemia, unspecified hyperlipidemia type    Smoker      See meds, orders, follow up, routing and instructions sections of encounter.  A 56-year-old established male patient.  He is in for a followup at our request.    His stress echocardiogram showed mild aortic enlargement and was nonischemic.    He had some weight loss, but has gained 2 pounds since last visit.  This is   annotated in my last record.  His workup included an ultrasound that showed   steatohepatitis; however, the patient has no complaints and he did have a chest   CT in 2015 that did not show any pulmonary or mediastinal concerns other than   ASCVD.    RECOMMENDATIONS:  Discussed smoking cessation, hypertension control and would   suggest a repeat stress echocardiogram in approximately 18 months.    Follow up in six months for BP recheck and for weight recheck.  Notify me for   any significant interval symptoms.      MARKELL/HN  dd: 12/06/2017 14:42:45 (CST)  td: 12/07/2017 09:56:39 (CST)  Doc ID   #[id number]  Job ID #[id number]    CC:

## 2018-11-15 RX ORDER — HYDROCHLOROTHIAZIDE 25 MG/1
TABLET ORAL
Qty: 90 TABLET | Refills: 3 | Status: SHIPPED | OUTPATIENT
Start: 2018-11-15 | End: 2019-01-25 | Stop reason: SDUPTHER

## 2018-11-15 RX ORDER — ATORVASTATIN CALCIUM 10 MG/1
TABLET, FILM COATED ORAL
Qty: 90 TABLET | Refills: 3 | Status: SHIPPED | OUTPATIENT
Start: 2018-11-15 | End: 2019-01-25 | Stop reason: SDUPTHER

## 2018-11-15 RX ORDER — AMLODIPINE BESYLATE 10 MG/1
TABLET ORAL
Qty: 90 TABLET | Refills: 0 | Status: SHIPPED | OUTPATIENT
Start: 2018-11-15 | End: 2019-01-25 | Stop reason: SDUPTHER

## 2018-11-15 RX ORDER — BENAZEPRIL HYDROCHLORIDE 40 MG/1
TABLET ORAL
Qty: 90 TABLET | Refills: 3 | Status: SHIPPED | OUTPATIENT
Start: 2018-11-15 | End: 2019-01-25 | Stop reason: SINTOL

## 2019-01-15 ENCOUNTER — TELEPHONE (OUTPATIENT)
Dept: INTERNAL MEDICINE | Facility: CLINIC | Age: 59
End: 2019-01-15

## 2019-01-15 DIAGNOSIS — I10 HYPERTENSION, ESSENTIAL: ICD-10-CM

## 2019-01-15 DIAGNOSIS — Z00.00 ANNUAL PHYSICAL EXAM: Primary | ICD-10-CM

## 2019-01-15 DIAGNOSIS — E78.5 HYPERLIPIDEMIA, UNSPECIFIED HYPERLIPIDEMIA TYPE: ICD-10-CM

## 2019-01-15 DIAGNOSIS — Z12.5 PROSTATE CANCER SCREENING: ICD-10-CM

## 2019-01-16 NOTE — TELEPHONE ENCOUNTER
Patient has an existing appointment - See lab orders and please call patient to schedule labs before appointment. Thank you

## 2019-01-22 ENCOUNTER — LAB VISIT (OUTPATIENT)
Dept: LAB | Facility: HOSPITAL | Age: 59
End: 2019-01-22
Attending: FAMILY MEDICINE
Payer: COMMERCIAL

## 2019-01-22 DIAGNOSIS — E78.5 HYPERLIPIDEMIA, UNSPECIFIED HYPERLIPIDEMIA TYPE: ICD-10-CM

## 2019-01-22 DIAGNOSIS — Z00.00 ANNUAL PHYSICAL EXAM: ICD-10-CM

## 2019-01-22 DIAGNOSIS — Z12.5 PROSTATE CANCER SCREENING: ICD-10-CM

## 2019-01-22 DIAGNOSIS — I10 HYPERTENSION, ESSENTIAL: ICD-10-CM

## 2019-01-22 LAB
ALBUMIN SERPL BCP-MCNC: 4 G/DL
ALP SERPL-CCNC: 66 U/L
ALT SERPL W/O P-5'-P-CCNC: 62 U/L
ANION GAP SERPL CALC-SCNC: 8 MMOL/L
AST SERPL-CCNC: 47 U/L
BILIRUB SERPL-MCNC: 1.4 MG/DL
BUN SERPL-MCNC: 15 MG/DL
CALCIUM SERPL-MCNC: 9.7 MG/DL
CHLORIDE SERPL-SCNC: 102 MMOL/L
CHOLEST SERPL-MCNC: 158 MG/DL
CHOLEST/HDLC SERPL: 1.7 {RATIO}
CO2 SERPL-SCNC: 28 MMOL/L
COMPLEXED PSA SERPL-MCNC: 1.4 NG/ML
CREAT SERPL-MCNC: 0.8 MG/DL
EST. GFR  (AFRICAN AMERICAN): >60 ML/MIN/1.73 M^2
EST. GFR  (NON AFRICAN AMERICAN): >60 ML/MIN/1.73 M^2
GLUCOSE SERPL-MCNC: 97 MG/DL
HDLC SERPL-MCNC: 92 MG/DL
HDLC SERPL: 58.2 %
LDLC SERPL CALC-MCNC: 60.6 MG/DL
NONHDLC SERPL-MCNC: 66 MG/DL
POTASSIUM SERPL-SCNC: 4.2 MMOL/L
PROT SERPL-MCNC: 7.1 G/DL
SODIUM SERPL-SCNC: 138 MMOL/L
TRIGL SERPL-MCNC: 27 MG/DL

## 2019-01-22 PROCEDURE — 80053 COMPREHEN METABOLIC PANEL: CPT

## 2019-01-22 PROCEDURE — 36415 COLL VENOUS BLD VENIPUNCTURE: CPT

## 2019-01-22 PROCEDURE — 84153 ASSAY OF PSA TOTAL: CPT

## 2019-01-22 PROCEDURE — 80061 LIPID PANEL: CPT

## 2019-01-25 ENCOUNTER — LAB VISIT (OUTPATIENT)
Dept: LAB | Facility: HOSPITAL | Age: 59
End: 2019-01-25
Attending: FAMILY MEDICINE
Payer: COMMERCIAL

## 2019-01-25 ENCOUNTER — IMMUNIZATION (OUTPATIENT)
Dept: INTERNAL MEDICINE | Facility: CLINIC | Age: 59
End: 2019-01-25
Payer: COMMERCIAL

## 2019-01-25 ENCOUNTER — OFFICE VISIT (OUTPATIENT)
Dept: INTERNAL MEDICINE | Facility: CLINIC | Age: 59
End: 2019-01-25
Attending: FAMILY MEDICINE
Payer: COMMERCIAL

## 2019-01-25 VITALS
SYSTOLIC BLOOD PRESSURE: 124 MMHG | BODY MASS INDEX: 24.99 KG/M2 | TEMPERATURE: 99 F | HEIGHT: 68 IN | OXYGEN SATURATION: 97 % | WEIGHT: 164.88 LBS | DIASTOLIC BLOOD PRESSURE: 72 MMHG | HEART RATE: 72 BPM

## 2019-01-25 DIAGNOSIS — L98.9 SKIN LESION: ICD-10-CM

## 2019-01-25 DIAGNOSIS — Z98.890 HISTORY OF RADIOFREQUENCY ABLATION (RFA) PROCEDURE FOR CARDIAC ARRHYTHMIA: ICD-10-CM

## 2019-01-25 DIAGNOSIS — E78.5 HYPERLIPIDEMIA, UNSPECIFIED HYPERLIPIDEMIA TYPE: ICD-10-CM

## 2019-01-25 DIAGNOSIS — I77.9 AORTIC DISEASE: ICD-10-CM

## 2019-01-25 DIAGNOSIS — K76.0 HEPATIC STEATOSIS: ICD-10-CM

## 2019-01-25 DIAGNOSIS — K63.5 POLYP OF COLON, UNSPECIFIED PART OF COLON, UNSPECIFIED TYPE: ICD-10-CM

## 2019-01-25 DIAGNOSIS — I10 HYPERTENSION, ESSENTIAL: ICD-10-CM

## 2019-01-25 DIAGNOSIS — I77.89 ENLARGED THORACIC AORTA: ICD-10-CM

## 2019-01-25 DIAGNOSIS — Z12.5 PROSTATE CANCER SCREENING: ICD-10-CM

## 2019-01-25 DIAGNOSIS — I25.10 ASCVD (ARTERIOSCLEROTIC CARDIOVASCULAR DISEASE): ICD-10-CM

## 2019-01-25 DIAGNOSIS — Z12.11 COLON CANCER SCREENING: ICD-10-CM

## 2019-01-25 DIAGNOSIS — Z00.00 ANNUAL PHYSICAL EXAM: Primary | ICD-10-CM

## 2019-01-25 DIAGNOSIS — F17.200 SMOKER: ICD-10-CM

## 2019-01-25 DIAGNOSIS — Z00.00 ANNUAL PHYSICAL EXAM: ICD-10-CM

## 2019-01-25 LAB
ALBUMIN SERPL BCP-MCNC: 4.3 G/DL
ALP SERPL-CCNC: 72 U/L
ALT SERPL W/O P-5'-P-CCNC: 53 U/L
ANION GAP SERPL CALC-SCNC: 8 MMOL/L
AST SERPL-CCNC: 33 U/L
BILIRUB SERPL-MCNC: 1.7 MG/DL
BUN SERPL-MCNC: 10 MG/DL
CALCIUM SERPL-MCNC: 10 MG/DL
CHLORIDE SERPL-SCNC: 100 MMOL/L
CHOLEST SERPL-MCNC: 162 MG/DL
CHOLEST/HDLC SERPL: 1.8 {RATIO}
CO2 SERPL-SCNC: 28 MMOL/L
COMPLEXED PSA SERPL-MCNC: 1.6 NG/ML
CREAT SERPL-MCNC: 0.8 MG/DL
EST. GFR  (AFRICAN AMERICAN): >60 ML/MIN/1.73 M^2
EST. GFR  (NON AFRICAN AMERICAN): >60 ML/MIN/1.73 M^2
GLUCOSE SERPL-MCNC: 85 MG/DL
HDLC SERPL-MCNC: 91 MG/DL
HDLC SERPL: 56.2 %
LDLC SERPL CALC-MCNC: 63 MG/DL
NONHDLC SERPL-MCNC: 71 MG/DL
POTASSIUM SERPL-SCNC: 4 MMOL/L
PROT SERPL-MCNC: 7.4 G/DL
SODIUM SERPL-SCNC: 136 MMOL/L
TRIGL SERPL-MCNC: 40 MG/DL

## 2019-01-25 PROCEDURE — 36415 COLL VENOUS BLD VENIPUNCTURE: CPT

## 2019-01-25 PROCEDURE — 99396 PR PREVENTIVE VISIT,EST,40-64: ICD-10-PCS | Mod: S$GLB,,, | Performed by: FAMILY MEDICINE

## 2019-01-25 PROCEDURE — 90471 FLU VACCINE (QUAD) GREATER THAN OR EQUAL TO 3YO PRESERVATIVE FREE IM: ICD-10-PCS | Mod: S$GLB,,, | Performed by: INTERNAL MEDICINE

## 2019-01-25 PROCEDURE — 84153 ASSAY OF PSA TOTAL: CPT

## 2019-01-25 PROCEDURE — 3074F PR MOST RECENT SYSTOLIC BLOOD PRESSURE < 130 MM HG: ICD-10-PCS | Mod: CPTII,S$GLB,, | Performed by: FAMILY MEDICINE

## 2019-01-25 PROCEDURE — 80074 ACUTE HEPATITIS PANEL: CPT

## 2019-01-25 PROCEDURE — 90686 FLU VACCINE (QUAD) GREATER THAN OR EQUAL TO 3YO PRESERVATIVE FREE IM: ICD-10-PCS | Mod: S$GLB,,, | Performed by: INTERNAL MEDICINE

## 2019-01-25 PROCEDURE — 99999 PR PBB SHADOW E&M-EST. PATIENT-LVL V: ICD-10-PCS | Mod: PBBFAC,,, | Performed by: FAMILY MEDICINE

## 2019-01-25 PROCEDURE — 80053 COMPREHEN METABOLIC PANEL: CPT

## 2019-01-25 PROCEDURE — 90686 IIV4 VACC NO PRSV 0.5 ML IM: CPT | Mod: S$GLB,,, | Performed by: INTERNAL MEDICINE

## 2019-01-25 PROCEDURE — 99999 PR PBB SHADOW E&M-EST. PATIENT-LVL V: CPT | Mod: PBBFAC,,, | Performed by: FAMILY MEDICINE

## 2019-01-25 PROCEDURE — 3074F SYST BP LT 130 MM HG: CPT | Mod: CPTII,S$GLB,, | Performed by: FAMILY MEDICINE

## 2019-01-25 PROCEDURE — 3078F DIAST BP <80 MM HG: CPT | Mod: CPTII,S$GLB,, | Performed by: FAMILY MEDICINE

## 2019-01-25 PROCEDURE — 99396 PREV VISIT EST AGE 40-64: CPT | Mod: S$GLB,,, | Performed by: FAMILY MEDICINE

## 2019-01-25 PROCEDURE — 3078F PR MOST RECENT DIASTOLIC BLOOD PRESSURE < 80 MM HG: ICD-10-PCS | Mod: CPTII,S$GLB,, | Performed by: FAMILY MEDICINE

## 2019-01-25 PROCEDURE — 80061 LIPID PANEL: CPT

## 2019-01-25 PROCEDURE — 90471 IMMUNIZATION ADMIN: CPT | Mod: S$GLB,,, | Performed by: INTERNAL MEDICINE

## 2019-01-25 RX ORDER — HYDROCHLOROTHIAZIDE 25 MG/1
25 TABLET ORAL DAILY
Qty: 90 TABLET | Refills: 3 | Status: SHIPPED | OUTPATIENT
Start: 2019-01-25 | End: 2019-10-30 | Stop reason: SDUPTHER

## 2019-01-25 RX ORDER — LOSARTAN POTASSIUM 100 MG/1
100 TABLET ORAL DAILY
Qty: 90 TABLET | Refills: 3 | Status: SHIPPED | OUTPATIENT
Start: 2019-01-25 | End: 2020-01-23 | Stop reason: SDUPTHER

## 2019-01-25 RX ORDER — AMLODIPINE BESYLATE 10 MG/1
10 TABLET ORAL DAILY
Qty: 90 TABLET | Refills: 3 | Status: SHIPPED | OUTPATIENT
Start: 2019-01-25 | End: 2019-10-30 | Stop reason: SDUPTHER

## 2019-01-25 RX ORDER — ATORVASTATIN CALCIUM 10 MG/1
10 TABLET, FILM COATED ORAL DAILY
Qty: 90 TABLET | Refills: 3 | Status: SHIPPED | OUTPATIENT
Start: 2019-01-25 | End: 2019-10-30 | Stop reason: SDUPTHER

## 2019-01-25 RX ORDER — ASPIRIN 81 MG/1
81 TABLET ORAL DAILY
COMMUNITY

## 2019-01-25 NOTE — PROGRESS NOTES
Subjective:       Patient ID: Kip Lauren is a 58 y.o. male.    Chief Complaint: Annual Exam    Established patient for an annual wellness check/physical exam and also chronic disease management. Specific complaints - see dictation and please see ROS.  P, S, Fm, Soc Hx's; Meds, allergies reviewed and reconciled.  Health maintenance file reviewed and addressed items due.      Review of Systems   Constitutional: Negative for chills, fatigue, fever and unexpected weight change.   HENT: Negative for congestion and trouble swallowing.    Eyes: Negative for redness and visual disturbance.   Respiratory: Negative for cough, chest tightness and shortness of breath.    Cardiovascular: Negative for chest pain, palpitations and leg swelling.   Gastrointestinal: Negative for abdominal pain and blood in stool.   Genitourinary: Negative for difficulty urinating and hematuria.   Musculoskeletal: Negative for arthralgias, back pain, gait problem, joint swelling, myalgias and neck pain.   Skin: Negative for color change and rash.   Neurological: Negative for tremors, speech difficulty, weakness, numbness and headaches.   Hematological: Negative for adenopathy. Does not bruise/bleed easily.   Psychiatric/Behavioral: Negative for behavioral problems, confusion and sleep disturbance. The patient is not nervous/anxious.        Objective:      Physical Exam   Constitutional: He appears well-developed and well-nourished.   HENT:   Head: Normocephalic.   Right Ear: Tympanic membrane, external ear and ear canal normal.   Left Ear: Tympanic membrane, external ear and ear canal normal.   Mouth/Throat: Oropharynx is clear and moist.   Eyes: Pupils are equal, round, and reactive to light.   Neck: Normal range of motion. Neck supple. Carotid bruit is not present. No thyromegaly present.   Cardiovascular: Normal rate, regular rhythm, normal heart sounds and intact distal pulses. Exam reveals no gallop and no friction rub.   No murmur  heard.  Pulmonary/Chest: Effort normal and breath sounds normal.   Abdominal: Soft. He exhibits no distension and no mass. There is no tenderness. There is no rebound and no guarding.   Musculoskeletal: Normal range of motion. He exhibits no edema or tenderness.   Lymphadenopathy:     He has no cervical adenopathy.   Neurological: He is alert. He has normal strength. He displays normal reflexes. No cranial nerve deficit or sensory deficit. Coordination and gait normal.   Skin: Skin is warm and dry.        Psychiatric: He has a normal mood and affect. His behavior is normal. Judgment and thought content normal.   Nursing note and vitals reviewed.      Assessment:       1. Annual physical exam    2. Hypertension, essential    3. Hyperlipidemia, unspecified hyperlipidemia type    4. Smoker    5. Polyp of colon, unspecified part of colon, unspecified type    6. ASCVD (arteriosclerotic cardiovascular disease)    7. Hepatic steatosis    8. Enlarged thoracic aorta    9. Aortic disease    10. History of radiofrequency ablation (RFA) procedure for cardiac arrhythmia    11. Skin lesion    12. Colon cancer screening        Plan:   Kip was seen today for annual exam.    Diagnoses and all orders for this visit:    Annual physical exam    Hypertension, essential    Hyperlipidemia, unspecified hyperlipidemia type    Smoker  -     Ambulatory referral to Smoking Cessation Program  -     CT Chest Without Contrast; Future    Polyp of colon, unspecified part of colon, unspecified type  -     Case request GI: COLONOSCOPY    ASCVD (arteriosclerotic cardiovascular disease)    Hepatic steatosis  -     Hepatitis panel, acute; Future  -     Ambulatory Referral to Hepatology    Enlarged thoracic aorta  -     CT Chest Without Contrast; Future    Aortic disease  -     Cancel: CT Chest Without Contrast; Future  -     CT Chest Without Contrast; Future    History of radiofrequency ablation (RFA) procedure for cardiac arrhythmia    Skin lesion  -      Ambulatory referral to Dermatology    Colon cancer screening  -     Case request GI: COLONOSCOPY    Other orders  -     amLODIPine (NORVASC) 10 MG tablet; Take 1 tablet (10 mg total) by mouth once daily.  -     atorvastatin (LIPITOR) 10 MG tablet; Take 1 tablet (10 mg total) by mouth once daily.  -     hydroCHLOROthiazide (HYDRODIURIL) 25 MG tablet; Take 1 tablet (25 mg total) by mouth once daily.  -     losartan (COZAAR) 100 MG tablet; Take 1 tablet (100 mg total) by mouth once daily.      See meds, orders, follow up, routing and instructions sections of encounter.  This is a patient in for his annual physical examination.  His AST and ALT were   elevated this year.  He had had a workup in the distant past consisting of   hepatitis panel with Dr. Jay.  His liver ultrasound in 2017 reflected   steatohepatitis.  He does drink 6-8 beers per day at a minimum.  He does   continue to smoke.    He was referred to Dermatology last year for a nodule to the left anterior   shoulder area.  They did not go over insurance concerns.  That nodule has   increased in size this year and I will reconsult.  Discussed tobacco use,   alcohol use, steatosis.  He is due for colonoscopy.  We placed referrals as   shown and I provided them with the pricing number to check on co-pays.  There   was a concern about a prior chest CT.  I did recommend that going forward and   they will again check pricing to try to follow through with that.    Medications refilled.      MARKELL/HN  dd: 01/25/2019 16:44:16 (CST)  td: 01/26/2019 10:11:18 (CST)  Doc ID   #[id number]  Job ID #[id number]    CC:

## 2019-01-25 NOTE — PATIENT INSTRUCTIONS
Schedule lab orders for today.     Flu shot today    If not contacted in a couple weeks - Colonoscopy Scheduling Number - [phone]    [facility] Pricing Office:  [phone]      Information about cholesterol, high blood pressure and healthy diet and activity recommendations can be found at the following links on the Internet:    http://www.nhlbi.nih.gov/health/health-topics/topics/hbc  http://www.nhlbi.nih.gov/health/educational/lose_wt/index.htm  Http://www.nhlbi.nih.gov/files/docs/public/heart/hbp_low.pdf  http://www.heart.org/HEARTORG/  http://diabetes.org/  https://www.cdc.gov/  Https://healthfinder.gov/

## 2019-01-28 LAB
HAV IGM SERPL QL IA: NEGATIVE
HBV CORE IGM SERPL QL IA: NEGATIVE
HBV SURFACE AG SERPL QL IA: NEGATIVE
HCV AB SERPL QL IA: NEGATIVE

## 2019-01-30 ENCOUNTER — INITIAL CONSULT (OUTPATIENT)
Dept: DERMATOLOGY | Facility: CLINIC | Age: 59
End: 2019-01-30
Attending: FAMILY MEDICINE
Payer: COMMERCIAL

## 2019-01-30 DIAGNOSIS — Z12.83 SKIN CANCER SCREENING: ICD-10-CM

## 2019-01-30 DIAGNOSIS — D22.9 MULTIPLE BENIGN NEVI: ICD-10-CM

## 2019-01-30 DIAGNOSIS — L82.1 SK (SEBORRHEIC KERATOSIS): ICD-10-CM

## 2019-01-30 DIAGNOSIS — D48.5 NEOPLASM OF UNCERTAIN BEHAVIOR OF SKIN: Primary | ICD-10-CM

## 2019-01-30 PROCEDURE — 99202 PR OFFICE/OUTPT VISIT, NEW, LEVL II, 15-29 MIN: ICD-10-PCS | Mod: 25,S$GLB,, | Performed by: DERMATOLOGY

## 2019-01-30 PROCEDURE — 99999 PR PBB SHADOW E&M-EST. PATIENT-LVL III: CPT | Mod: PBBFAC,,, | Performed by: DERMATOLOGY

## 2019-01-30 PROCEDURE — 88305 TISSUE SPECIMEN TO PATHOLOGY, DERMATOLOGY: ICD-10-PCS | Mod: 26,,, | Performed by: PATHOLOGY

## 2019-01-30 PROCEDURE — 99999 PR PBB SHADOW E&M-EST. PATIENT-LVL III: ICD-10-PCS | Mod: PBBFAC,,, | Performed by: DERMATOLOGY

## 2019-01-30 PROCEDURE — 11102 PR TANGENTIAL BIOPSY, SKIN, SINGLE LESION: ICD-10-PCS | Mod: S$GLB,,, | Performed by: DERMATOLOGY

## 2019-01-30 PROCEDURE — 99202 OFFICE O/P NEW SF 15 MIN: CPT | Mod: 25,S$GLB,, | Performed by: DERMATOLOGY

## 2019-01-30 PROCEDURE — 11102 TANGNTL BX SKIN SINGLE LES: CPT | Mod: S$GLB,,, | Performed by: DERMATOLOGY

## 2019-01-30 PROCEDURE — 88305 TISSUE EXAM BY PATHOLOGIST: CPT | Performed by: PATHOLOGY

## 2019-01-30 NOTE — PROGRESS NOTES
Subjective:       Patient ID:  Kip Lauren is a 58 y.o. male who presents for   Chief Complaint   Patient presents with    Cyst     Left upper arm, itching, bleeding,growing, 1+ years, Tx. None     Cyst  - Initial  Affected locations: left arm  Duration: 1 year  Signs / symptoms: bleeding and itching  Severity: mild  Timing: constant  Aggravated by: friction  Relieving factors/Treatments tried: nothing  Improvement on treatment: no relief      No personal history of skin cancer or atypical moles.  No fam hx of melanoma.  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    Review of Systems   Constitutional: Negative for fever, chills, weight loss, weight gain, fatigue, night sweats and malaise.   Skin: Negative for daily sunscreen use and activity-related sunscreen use.        Objective:    Physical Exam   Constitutional: He appears well-developed and well-nourished.   Neurological: He is alert and oriented to person, place, and time.   Psychiatric: He has a normal mood and affect.   Skin:   Areas Examined (abnormalities noted in diagram):   Head / Face Inspection Performed  Neck Inspection Performed  Chest / Axilla Inspection Performed  Abdomen Inspection Performed  Back Inspection Performed  RUE Inspected  LUE Inspection Performed                       Diagram Legend     Erythematous scaling macule/papule c/w actinic keratosis       Vascular papule c/w angioma      Pigmented verrucoid papule/plaque c/w seborrheic keratosis      Yellow umbilicated papule c/w sebaceous hyperplasia      Irregularly shaped tan macule c/w lentigo     1-2 mm smooth white papules consistent with Milia      Movable subcutaneous cyst with punctum c/w epidermal inclusion cyst      Subcutaneous movable cyst c/w pilar cyst      Firm pink to brown papule c/w dermatofibroma      Pedunculated fleshy papule(s) c/w skin tag(s)      Evenly pigmented macule c/w junctional nevus     Mildly variegated pigmented, slightly  irregular-bordered macule c/w mildly atypical nevus      Flesh colored to evenly pigmented papule c/w intradermal nevus       Pink pearly papule/plaque c/w basal cell carcinoma      Erythematous hyperkeratotic cursted plaque c/w SCC      Surgical scar with no sign of skin cancer recurrence      Open and closed comedones      Inflammatory papules and pustules      Verrucoid papule consistent consistent with wart     Erythematous eczematous patches and plaques     Dystrophic onycholytic nail with subungual debris c/w onychomycosis     Umbilicated papule    Erythematous-base heme-crusted tan verrucoid plaque consistent with inflamed seborrheic keratosis     Erythematous Silvery Scaling Plaque c/w Psoriasis     See annotation      Assessment / Plan:      Neoplasm of uncertain behavior of skin  Shave biopsy procedure note:    Shave biopsy performed after verbal consent including risk of infection, scar, recurrence, need for additional treatment of site. Area prepped with alcohol, anesthetized with approximately 1.0cc of 1% lidocaine with epinephrine. Lesional tissue shaved with razor blade. Hemostasis achieved with application of aluminum chloride followed by hyfrecation. No complications. Dressing applied. Wound care explained.    -     Tissue Specimen To Pathology, Dermatology  Pathology Orders:     Normal Orders This Visit    Tissue Specimen To Pathology, Dermatology     Questions:    Directional Terms:  Other(comment)    Clinical information:  r/o BCC vs. other Comment - shave    Specific Site:  L anterior shoulder            SK (seborrheic keratosis)  These are benign inherited growths without a malignant potential. Reassurance given to patient. No treatment is necessary.   Treatment of benign, asymptomatic lesions may be considered cosmetic.  Warned about risk of hypo- or hyperpigmentation with treatment and risk of recurrence.    Multiple benign nevi  Benign-appearing on exam today. Counseled pt to monitor mole(s)  and return to clinic if any changes noted or symptoms (bleeding, itching, pain, etc) noted. Brochure provided.    Skin cancer screening  Upper body skin examination performed today including at least 6 points as noted in physical examination. Suspicious lesions noted above.  Patient instructed in importance of daily broad spectrum sunscreen use with spf at least 30. Sun avoidance and topical protection/protective clothing discussed.      Follow-up in about 4 months (around 5/30/2019) for skin check or sooner pending biopsy results.

## 2019-01-30 NOTE — PATIENT INSTRUCTIONS
" Shave Biopsy Wound Care    Your doctor has performed a shave biopsy today.  A band aid and vaseline ointment has been placed over the site.  This should remain in place for 24 hours.  It is recommended that you keep the area dry for the first 24 hours.  After 24 hours, you may remove the band aid and wash the area with warm soap and water and apply Vaseline jelly.  Many patients prefer to use Neosporin or Bacitracin ointment.  This is acceptable; however, know that you can develop an allergy to this medication even if you have used it safely for years.  It is important to keep the area moist.  Letting it dry out and get air slows healing time, and will worsen the scar.  Band aid is optional after first 24 hours.      If you notice increasing redness, tenderness, pain, or yellow drainage at the biopsy site, please notify your doctor.  These are signs of an infection.    If your biopsy site is bleeding, apply firm pressure for 15 minutes straight.  Repeat for another 15 minutes, if it is still bleeding.   If the surgical site continues to bleed, then please contact your doctor.      For [facility] users:   You will receive a [facility] notification after the pathologist has finished reviewing your biopsy specimen. Pathology results, however, will not be released online so you will see a "no content" message. Once your dermatologist reviews and clinically correlates your biopsy results, you will either receive a letter in the mail with the results of a phone call from your doctor's office if further explanation or treatment is warranted.       [address] [place] [address]/ [phone] FAX/ [email or web address]      Summer Sun Protection      The [facility] Department of Dermat[facility] would like to remind you of the importance of sun protection all year round and particularly during the summer when the suns rays are the strongest. It has been proven that both acute and chronic sun exposure " damages our cells and leads to skin cancer. Beyond skin cancer, the sun causes 90% of the symptoms of pre-mature skin aging, including wrinkles, lentigines (brown spots), and thin, easily bruised skin. Proper sun protection can help prevent these unwanted conditions.    Many patients report that the dont go in the sun. It has been shown that the average person receives 18 hours of incidental sun exposure per week during activities such as walking through parking lots, driving, or sitting next to windows. This accumulates to several bad sunburns per year!    In choosing sunscreen, you want one that protects against both UVA and UVB rays. It is recommended that you use one of SPF 30 or higher. It is important to apply the sunscreen about 20 minutes prior to sun exposure. Most sunscreens are chemical sunscreens and a reaction must take place in the skin so that they are effective. If they are applied and then you are immediately exposed to the sun or start sweating, this reaction has not had time to take place and you are therefore unprotected. Sunscreen needs to be reapplied every 2 hours if you are participating in water sports or sweating. We recommend Elta MD or Neutrogena Ultra Sheer Dry Touch SPF 55 for daily use; however there are many options and it is most important for you to find one that you will use on a consistent basis.    If you have sensitive skin, you may do best with a sunscreen that contains only physical blockers such as titanium dioxide or zinc oxide. These are typically thicker and harder to apply, however they afford very good protection. Neutrogena Sensitive Skin, Blue Lizard Sensitive Skin (pink top) or Neutrogena Pure and Free are popular ones.     Aside from sunscreen, clothes with UV protection, wide brimmed hats, and sunglasses are other means of sun protection that we recommend.                        [facility] - DERMATOLOGY  [address]  [address]  Dept: [phone]  Dept Fax: [phone]

## 2019-01-30 NOTE — LETTER
January 30, 2019      Romulo Rees MD  [address]  [facility] [address]           [facility] - Dermatology  [address]  [facility] [address]  Phone: [phone]  Fax: [phone]          Patient: Kip Lauren   MR Number: [id number]   YOB: 1960   Date of Visit: 1/30/2019       Dear Dr. Romulo Rees:    Thank you for referring Kip Lauren to me for evaluation. Attached you will find relevant portions of my assessment and plan of care.    If you have questions, please do not hesitate to call me. I look forward to following Kip Lauren along with you.    Sincerely,    Leslie Metz MD    Enclosure  CC:  No Recipients    If you would like to receive this communication electronically, please contact [email or web address] or [phone] to request more information on [employer] Link access.    For providers and/or their staff who would like to refer a patient to [facility], please contact us through our one-stop-shop provider referral line, [facility], at [phone].    If you feel you have received this communication in error or would no longer like to receive these types of communications, please e-mail [email or web address]

## 2019-02-07 ENCOUNTER — DOCUMENTATION ONLY (OUTPATIENT)
Dept: TRANSPLANT | Facility: CLINIC | Age: 59
End: 2019-02-07

## 2019-02-07 ENCOUNTER — TELEPHONE (OUTPATIENT)
Dept: DERMATOLOGY | Facility: CLINIC | Age: 59
End: 2019-02-07

## 2019-02-07 NOTE — TELEPHONE ENCOUNTER
I called the pt to let him know the nurse will call him on 2-8-19 about he bx report.    ----- Message from Manuela Joseph sent at 2/7/2019  2:50 PM CST -----  Contact: PT [phone]  Patient Returning Call from [facility]    Who Left Message for Patient:[facility] staff  Communication Preference:call  Additional Information:Missed call today in ref to his biopsy.  Doesn't know who called!

## 2019-02-08 ENCOUNTER — TELEPHONE (OUTPATIENT)
Dept: DERMATOLOGY | Facility: CLINIC | Age: 59
End: 2019-02-08

## 2019-02-08 NOTE — TELEPHONE ENCOUNTER
----- Message from Kareen Garza MA sent at 2/7/2019  3:49 PM CST -----  Contact: PT [phone]  The pt would like his bx result. Thanks.    ----- Message -----  From: Manuela Joseph  Sent: 2/7/2019   2:50 PM  To: [facility] Staff    Patient Returning Call from [facility]    Who Left Message for Patient:[facility] staff  Communication Preference:call  Additional Information:Missed call today in ref to his biopsy.  Doesn't know who called!

## 2019-02-15 ENCOUNTER — PROCEDURE VISIT (OUTPATIENT)
Dept: DERMATOLOGY | Facility: CLINIC | Age: 59
End: 2019-02-15
Payer: COMMERCIAL

## 2019-02-15 DIAGNOSIS — C44.619 BASAL CELL CARCINOMA (BCC) OF LEFT SHOULDER: Primary | ICD-10-CM

## 2019-02-15 PROCEDURE — 88305 TISSUE SPECIMEN TO PATHOLOGY, DERMATOLOGY: ICD-10-PCS | Mod: 26,,, | Performed by: PATHOLOGY

## 2019-02-15 PROCEDURE — 12032 PR LAYR CLOS WND TRUNK,ARM,LEG 2.6-7.5 CM: ICD-10-PCS | Mod: S$GLB,,, | Performed by: DERMATOLOGY

## 2019-02-15 PROCEDURE — 99499 UNLISTED E&M SERVICE: CPT | Mod: S$GLB,,, | Performed by: DERMATOLOGY

## 2019-02-15 PROCEDURE — 11603 PR EXC SKIN MALIG 2.1-3 CM TRUNK,ARM,LEG: ICD-10-PCS | Mod: 51,S$GLB,, | Performed by: DERMATOLOGY

## 2019-02-15 PROCEDURE — 12032 INTMD RPR S/A/T/EXT 2.6-7.5: CPT | Mod: S$GLB,,, | Performed by: DERMATOLOGY

## 2019-02-15 PROCEDURE — 88305 TISSUE EXAM BY PATHOLOGIST: CPT | Performed by: PATHOLOGY

## 2019-02-15 PROCEDURE — 99499 NO LOS: ICD-10-PCS | Mod: S$GLB,,, | Performed by: DERMATOLOGY

## 2019-02-15 PROCEDURE — 11603 EXC TR-EXT MAL+MARG 2.1-3 CM: CPT | Mod: 51,S$GLB,, | Performed by: DERMATOLOGY

## 2019-02-17 NOTE — PROGRESS NOTES
PROCEDURE: Elliptical excision with intermediate layered repair in order to decrease tension and close large gap.    ANESTHETIC: 12 cc 1% Xylocaine with Epinephrine 1:100,000, buffered    SURGEON: Leslie Metz M.D.    ASSISTANTS: Tamara Ren MA    PREOPERATIVE DIAGNOSIS:  Biopsy-proven Basal Cell Carcinoma    POSTOPERATIVE DIAGNOSIS:  Same as preoperative diagnosis    PATHOLOGIC DIAGNOSIS: Pending    LOCATION: L anterior shoulder    INITIAL LESION SIZE: 2x1.8 cm    EXCISED DIAMETER: 2.8 cm    PREPARATION: The diagnosis, procedure, alternatives, benefits and risks, including but not limited to: infection, bleeding/bruising, drug reactions, pain, scar or cosmetic defect, local sensation disturbances, wound dehiscence (separation of wound edges after sutures removed) and/or recurrence of present condition were explained to the patient. The patient elected to proceed.  Patient's identity was verified using 2 patient identifiers and the side and site was verified.  Time out period with surgeon, assistant and patient in surgical suite was taken.    PROCEDURE: The location noted above was prepped, draped, and anesthetized in the usual sterile fashion per Tamara Ren MA. Lesional tissue was carefully marked with at least 4 mm margins of clinically normal skin in all directions. A fusiform elliptical excision was done with #15 blade carried down completely through the dermis into the deep subcutaneous tissues to the level of the non-muscle fascia, and dissection was carried out in that plane.  Electrocoagulation was used to obtain hemostasis. Blood loss was minimal. The wound was then approximated in a layered fashion with subcutaneous and intradermal sutures of 3.0 Monocryl, approximately 6 in number, and the wound was then superficially closed with simple interrupted sutures of 4.0 Prolene.    The patient tolerated the procedure well.    The area was cleaned and dressed appropriately and the patient was  given wound care instructions, as well as an appointment for follow-up evaluation.    LENGTH OF REPAIR: 6.7 cm    rtc 2 wks for suture removal

## 2019-03-01 ENCOUNTER — CLINICAL SUPPORT (OUTPATIENT)
Dept: DERMATOLOGY | Facility: CLINIC | Age: 59
End: 2019-03-01
Payer: COMMERCIAL

## 2019-03-01 PROCEDURE — 99024 PR POST-OP FOLLOW-UP VISIT: ICD-10-PCS | Mod: S$GLB,,, | Performed by: DERMATOLOGY

## 2019-03-01 PROCEDURE — 99999 PR PBB SHADOW E&M-EST. PATIENT-LVL II: ICD-10-PCS | Mod: PBBFAC,,,

## 2019-03-01 PROCEDURE — 99024 POSTOP FOLLOW-UP VISIT: CPT | Mod: S$GLB,,, | Performed by: DERMATOLOGY

## 2019-03-01 PROCEDURE — 99999 PR PBB SHADOW E&M-EST. PATIENT-LVL II: CPT | Mod: PBBFAC,,,

## 2019-03-01 NOTE — PROGRESS NOTES
Suture Removal note:  CC: 58 y.o. male patient is here for suture removal.         HPI: Patient is s/p excision of Excision of basal cell carcinoma from the Left anterior shoulder check margins on 02/15/2019.  Patient reports no problems.    WOUND PE:  Sutures intact.  Wound healing well.  Good approximation of skin edges.  No signs or symptoms of infection.    IMPRESSION:  Skin, left anterior shoulder, excision:  - RESIDUAL BASAL CELL CARCINOMA.  - MARGINS ARE NEGATIVE.  - PREVIOUS BIOPSY SITE CHANGES.  MICROSCOPIC DESCRIPTION: Sections show a basaloid tumor within the dermis exhibiting peripheral palisading,  mitotic activity, and apoptotic tumor cells. The tumor is located in close proximity to an area with changes consistent  with a previous biopsy site. All margins are negative.  Diagnosed by: Eunice Mi M.D.  (Electronically Signed: 2019-02-27 12:08:29) - margins clear.    PLAN:  Sutures removed today.  Continue wound care.    RTC: PRN

## 2019-07-18 ENCOUNTER — OFFICE VISIT (OUTPATIENT)
Dept: PODIATRY | Facility: CLINIC | Age: 59
End: 2019-07-18
Payer: COMMERCIAL

## 2019-07-18 ENCOUNTER — HOSPITAL ENCOUNTER (OUTPATIENT)
Dept: RADIOLOGY | Facility: HOSPITAL | Age: 59
Discharge: HOME | End: 2019-07-18
Attending: PODIATRIST
Payer: COMMERCIAL

## 2019-07-18 VITALS
BODY MASS INDEX: 24.86 KG/M2 | DIASTOLIC BLOOD PRESSURE: 90 MMHG | SYSTOLIC BLOOD PRESSURE: 151 MMHG | HEIGHT: 68 IN | WEIGHT: 164 LBS | HEART RATE: 80 BPM

## 2019-07-18 DIAGNOSIS — M79.674 PAIN OF TOE OF RIGHT FOOT: ICD-10-CM

## 2019-07-18 DIAGNOSIS — L84 CORN OR CALLUS: ICD-10-CM

## 2019-07-18 DIAGNOSIS — M20.5X1 MALLET TOE OF RIGHT FOOT: ICD-10-CM

## 2019-07-18 DIAGNOSIS — M79.674 PAIN OF TOE OF RIGHT FOOT: Primary | ICD-10-CM

## 2019-07-18 PROCEDURE — 3080F DIAST BP >= 90 MM HG: CPT | Mod: CPTII,S$GLB,, | Performed by: PODIATRIST

## 2019-07-18 PROCEDURE — 99203 OFFICE O/P NEW LOW 30 MIN: CPT | Mod: S$GLB,,, | Performed by: PODIATRIST

## 2019-07-18 PROCEDURE — 99999 PR PBB SHADOW E&M-EST. PATIENT-LVL III: ICD-10-PCS | Mod: PBBFAC,,, | Performed by: PODIATRIST

## 2019-07-18 PROCEDURE — 73630 X-RAY EXAM OF FOOT: CPT | Mod: 26,RT,, | Performed by: RADIOLOGY

## 2019-07-18 PROCEDURE — 99999 PR PBB SHADOW E&M-EST. PATIENT-LVL III: CPT | Mod: PBBFAC,,, | Performed by: PODIATRIST

## 2019-07-18 PROCEDURE — 99203 PR OFFICE/OUTPT VISIT, NEW, LEVL III, 30-44 MIN: ICD-10-PCS | Mod: S$GLB,,, | Performed by: PODIATRIST

## 2019-07-18 PROCEDURE — 73630 X-RAY EXAM OF FOOT: CPT | Mod: TC,RT

## 2019-07-18 PROCEDURE — 3008F PR BODY MASS INDEX (BMI) DOCUMENTED: ICD-10-PCS | Mod: CPTII,S$GLB,, | Performed by: PODIATRIST

## 2019-07-18 PROCEDURE — 3080F PR MOST RECENT DIASTOLIC BLOOD PRESSURE >= 90 MM HG: ICD-10-PCS | Mod: CPTII,S$GLB,, | Performed by: PODIATRIST

## 2019-07-18 PROCEDURE — 73630 XR FOOT COMPLETE 3 VIEW RIGHT: ICD-10-PCS | Mod: 26,RT,, | Performed by: RADIOLOGY

## 2019-07-18 PROCEDURE — 3077F SYST BP >= 140 MM HG: CPT | Mod: CPTII,S$GLB,, | Performed by: PODIATRIST

## 2019-07-18 PROCEDURE — 3008F BODY MASS INDEX DOCD: CPT | Mod: CPTII,S$GLB,, | Performed by: PODIATRIST

## 2019-07-18 PROCEDURE — 3077F PR MOST RECENT SYSTOLIC BLOOD PRESSURE >= 140 MM HG: ICD-10-PCS | Mod: CPTII,S$GLB,, | Performed by: PODIATRIST

## 2019-07-18 RX ORDER — AMMONIUM LACTATE 12 G/100G
CREAM TOPICAL
Qty: 140 G | Refills: 11 | Status: SHIPPED | OUTPATIENT
Start: 2019-07-18

## 2019-07-18 NOTE — PROGRESS NOTES
Subjective:      Patient ID: Kip Lauren is a 58 y.o. male.    Chief Complaint: Foot Pain (right foot)    Sharp burning pain tip 2nd toe right foot.  Gradual onset, worsening over past several weeks, aggravated by increased weight bearing, shoe gear, pressure.  No previous medical treatment.  OTC pain med not helping. History extensive foot/ankle/toe surgery right.    Review of Systems   Constitution: Negative for chills, diaphoresis, fever, malaise/fatigue and night sweats.   Cardiovascular: Negative for claudication, cyanosis, leg swelling and syncope.   Skin: Positive for suspicious lesions. Negative for color change, dry skin, nail changes, rash and unusual hair distribution.   Musculoskeletal: Negative for falls, joint pain, joint swelling, muscle cramps, muscle weakness and stiffness.   Gastrointestinal: Negative for constipation, diarrhea, nausea and vomiting.   Neurological: Negative for brief paralysis, disturbances in coordination, focal weakness, numbness, paresthesias, sensory change and tremors.           Objective:      Physical Exam   Constitutional: He is oriented to person, place, and time. He appears well-developed and well-nourished. He is cooperative. No distress.   Cardiovascular:   Pulses:       Popliteal pulses are 2+ on the right side, and 2+ on the left side.        Dorsalis pedis pulses are 2+ on the right side, and 2+ on the left side.        Posterior tibial pulses are 2+ on the right side, and 2+ on the left side.   Capillary refill 3 seconds all toes/distal feet, all toes/both feet warm to touch.      Negative lymphadenopathy bilateral popliteal fossa and tarsal tunnel.      Negavie lower extremity edema bilateral.     Musculoskeletal:        Right ankle: He exhibits normal range of motion, no swelling, no ecchymosis, no deformity, no laceration and normal pulse. Achilles tendon normal. Achilles tendon exhibits no pain, no defect and normal Ramirez's test results.   Pain to  palpation distal tip 2nd toe right with rigid mallet toe same without signs recent trauma or infection.    Otherwise, decreased range of motion and strength right toes, foot, ankle.    Left normal position tone range of motion all groups.   Lymphadenopathy: No inguinal adenopathy noted on the right or left side.   Negative lymphadenopathy bilateral popliteal fossa and tarsal tunnel.    Negative lymphangitic streaking bilateral feet/ankles/legs.   Neurological: He is alert and oriented to person, place, and time. He has normal strength. He displays no atrophy and no tremor. No sensory deficit. He exhibits normal muscle tone. Gait normal.   Reflex Scores:       Patellar reflexes are 2+ on the right side and 2+ on the left side.       Achilles reflexes are 2+ on the right side and 2+ on the left side.  Negative tinel sign to percussion sural, superficial peroneal, deep peroneal, saphenous, and posterior tibial nerves right and left ankles and feet.     Skin: Skin is warm, dry and intact. Capillary refill takes 2 to 3 seconds. No abrasion, no bruising, no burn, no ecchymosis, no laceration, no lesion and no rash noted. He is not diaphoretic. No cyanosis or erythema. No pallor. Nails show no clubbing.     Focal hyperkeratotic lesion consisting entirely of hyperkeratotic tissue without open skin, drainage, pus, fluctuance, malodor, or signs of infection distal right 2nd toe tip.    Otherwise, Skin is normal age and health appropriate color, turgor, texture, and temperature bilateral lower extremities without ulceration, hyperpigmentation, discoloration, masses nodules or cords palpated.  No ecchymosis, erythema, edema, or cardinal signs of infection bilateral lower extremities.     Psychiatric: He has a normal mood and affect.             Assessment:       Encounter Diagnoses   Name Primary?    Pain of toe of right foot Yes    Corn or callus     Mallet toe of right foot          Plan:       Kip was seen today for  foot pain.    Diagnoses and all orders for this visit:    Pain of toe of right foot  -     X-Ray Foot Complete Right; Future    Corn or callus  -     X-Ray Foot Complete Right; Future    Mallet toe of right foot  -     X-Ray Foot Complete Right; Future    Other orders  -     ammonium lactate 12 % Crea; Apply twice daily to affected parts both feet as needed.      I counseled the patient on his conditions, their implications and medical management.        Rx lac hydrin.    Patient will obtain over the counter arch supports and wear them in shoes whenever possible.  Athletic shoes intended for walking or running are usually best.    Patient will stretch the tendo achilles complex three times daily as demonstrated in the office.  Literature was dispensed illustrating proper stretching technique.    Discussed conservative treatment with shoes of adequate dimensions, material, and style to alleviate symptoms and delay or prevent surgical intervention.           Follow up if symptoms worsen or fail to improve.

## 2019-10-30 RX ORDER — HYDROCHLOROTHIAZIDE 25 MG/1
25 TABLET ORAL DAILY
Qty: 90 TABLET | Refills: 0 | Status: SHIPPED | OUTPATIENT
Start: 2019-10-30 | End: 2020-01-23 | Stop reason: SDUPTHER

## 2019-10-30 RX ORDER — AMLODIPINE BESYLATE 10 MG/1
10 TABLET ORAL DAILY
Qty: 90 TABLET | Refills: 0 | Status: SHIPPED | OUTPATIENT
Start: 2019-10-30 | End: 2020-01-23 | Stop reason: SDUPTHER

## 2019-10-30 RX ORDER — ATORVASTATIN CALCIUM 10 MG/1
10 TABLET, FILM COATED ORAL DAILY
Qty: 90 TABLET | Refills: 0 | Status: SHIPPED | OUTPATIENT
Start: 2019-10-30 | End: 2020-01-23 | Stop reason: SDUPTHER

## 2020-01-08 ENCOUNTER — TELEPHONE (OUTPATIENT)
Dept: INTERNAL MEDICINE | Facility: CLINIC | Age: 60
End: 2020-01-08

## 2020-01-08 DIAGNOSIS — Z00.00 ANNUAL PHYSICAL EXAM: Primary | ICD-10-CM

## 2020-01-08 DIAGNOSIS — Z12.5 PROSTATE CANCER SCREENING: ICD-10-CM

## 2020-01-08 DIAGNOSIS — E78.5 HYPERLIPIDEMIA, UNSPECIFIED HYPERLIPIDEMIA TYPE: ICD-10-CM

## 2020-01-08 DIAGNOSIS — I10 HYPERTENSION, ESSENTIAL: ICD-10-CM

## 2020-01-08 NOTE — TELEPHONE ENCOUNTER
----- Message from Lila Owens sent at 1/8/2020  1:04 PM CST -----  Contact: spouse  Doctor appointment and lab have been scheduled.  Please link lab orders to the lab appointment.  Date of doctor appointment:  02/14  Date of lab appointment:  02/7  Physical or EP: Epp  Comments:

## 2020-01-24 RX ORDER — ATORVASTATIN CALCIUM 10 MG/1
10 TABLET, FILM COATED ORAL DAILY
Qty: 90 TABLET | Refills: 0 | Status: SHIPPED | OUTPATIENT
Start: 2020-01-24 | End: 2020-02-18 | Stop reason: SDUPTHER

## 2020-01-24 RX ORDER — HYDROCHLOROTHIAZIDE 25 MG/1
25 TABLET ORAL DAILY
Qty: 90 TABLET | Refills: 0 | Status: SHIPPED | OUTPATIENT
Start: 2020-01-24 | End: 2020-02-18 | Stop reason: SDUPTHER

## 2020-01-24 RX ORDER — LOSARTAN POTASSIUM 100 MG/1
100 TABLET ORAL DAILY
Qty: 90 TABLET | Refills: 0 | Status: SHIPPED | OUTPATIENT
Start: 2020-01-24 | End: 2020-01-25

## 2020-01-24 RX ORDER — AMLODIPINE BESYLATE 10 MG/1
10 TABLET ORAL DAILY
Qty: 90 TABLET | Refills: 0 | Status: SHIPPED | OUTPATIENT
Start: 2020-01-24 | End: 2020-02-18 | Stop reason: SDUPTHER

## 2020-01-24 NOTE — PROGRESS NOTES
Refill Authorization Note     is requesting a refill authorization.    Brief assessment and rationale for refill: REVIEW: abnormal vitals          Medication Therapy Plan: BP elevated; FOVS/FLOS                              Comments:   Requested Prescriptions   Pending Prescriptions Disp Refills    losartan (COZAAR) 100 MG tablet 90 tablet 0     Sig: Take 1 tablet (100 mg total) by mouth once daily.       Cardiovascular:  Angiotensin Receptor Blockers Failed - 1/23/2020  8:25 AM        Failed - Last BP in normal range within 360 days     BP Readings from Last 3 Encounters:   07/18/19 (!) 151/90   01/25/19 124/72   12/06/17 120/75              Failed - Cr is 1.4 or below and within 360 days     Creatinine   Date Value Ref Range Status   01/25/2019 0.8 0.5 - 1.4 mg/dL Final   01/22/2019 0.8 0.5 - 1.4 mg/dL Final   11/08/2017 0.7 0.5 - 1.4 mg/dL Final              Failed - K in normal range and within 360 days     Potassium   Date Value Ref Range Status   01/25/2019 4.0 3.5 - 5.1 mmol/L Final   01/22/2019 4.2 3.5 - 5.1 mmol/L Final   11/08/2017 4.2 3.5 - 5.1 mmol/L Final              Failed - eGFR within 360 days     eGFR if non    Date Value Ref Range Status   01/25/2019 >60 >60 mL/min/1.73 m^2 Final     Comment:     Calculation used to obtain the estimated glomerular filtration  rate (eGFR) is the CKD-EPI equation.      01/22/2019 >60 >60 mL/min/1.73 m^2 Final     Comment:     Calculation used to obtain the estimated glomerular filtration  rate (eGFR) is the CKD-EPI equation.      11/08/2017 >60 >60 mL/min/1.73 m^2 Final     Comment:     Calculation used to obtain the estimated glomerular filtration  rate (eGFR) is the CKD-EPI equation.        eGFR if    Date Value Ref Range Status   01/25/2019 >60 >60 mL/min/1.73 m^2 Final   01/22/2019 >60 >60 mL/min/1.73 m^2 Final   11/08/2017 >60 >60 mL/min/1.73 m^2 Final              Passed - Patient is at least 18 years old         Passed - Office visit in past 12 months or future 90 days     Recent Outpatient Visits            6 months ago Pain of toe of right foot    [facility] D[facility]    12 months ago Annual physical exam    [facility] Romulo Rees MD    2 years ago Weight loss    [facility] Romulo Rees MD    2 years ago Annual physical exam    [facility] Romulo Rees MD    2 years ago Hypertension, essential    [facility] Romulo Rees MD          Future Appointments              In 2 weeks LAB, APPOINTMENT [facility]    In 3 weeks Romulo Rees MD [facility]               amLODIPine (NORVASC) 10 MG tablet 90 tablet 0     Sig: Take 1 tablet (10 mg total) by mouth once daily.       Cardiovascular:  Calcium Channel Blockers Failed - 1/23/2020  8:25 AM        Failed - Last BP in normal range within 360 days     BP Readings from Last 3 Encounters:   07/18/19 (!) 151/90   01/25/19 124/72   12/06/17 120/75              Passed - Patient is at least 18 years old        Passed - Office visit in past 12 months or future 90 days     Recent Outpatient Visits            6 months ago Pain of toe of right foot    [facility] Griggs DPM    12 months ago Annual physical exam    [facility] Romulo Rees MD    2 years ago Weight loss    [facility] Romulo Rees MD    2 years ago Annual physical exam    [facility] Romulo Rees MD    2 years ago Hypertension, essential    [facility] Romulo Rees MD          Future Appointments              In 2 weeks LAB, [facility]MENT [facility]    In 3 weeks Romulo Rees MD [facility]               atorvastatin (LIPITOR) 10 MG  tablet 90 tablet 0     Sig: Take 1 tablet (10 mg total) by mouth once daily.       Cardiovascular:  Antilipid - Statins Failed - 1/23/2020  8:25 AM        Failed - Lipid Panel completed in last 360 days     Lab Results   Component Value Date    CHOL 162 01/25/2019    HDL 91 (H) 01/25/2019    LDLCALC 63.0 01/25/2019    TRIG 40 01/25/2019             Failed - ALT is 94 or below and within 360 days     ALT   Date Value Ref Range Status   01/25/2019 53 (H) 10 - 44 U/L Final   01/22/2019 62 (H) 10 - 44 U/L Final   11/08/2017 28 10 - 44 U/L Final              Failed - AST is 54 or below and within 360 days     AST   Date Value Ref Range Status   01/25/2019 33 10 - 40 U/L Final   01/22/2019 47 (H) 10 - 40 U/L Final   11/08/2017 23 10 - 40 U/L Final              Passed - Patient is at least 18 years old        Passed - Office visit in past 12 months or future 90 days     Recent Outpatient Visits            6 months ago Pain of toe of right foot    [facility] - Podiatry Rey Griggs DPM    12 months ago Annual physical exam    [facility] Romulo Rees MD    2 years ago Weight loss    [facility] - Internal [facility] Romulo Rees MD    2 years ago Annual physical exam    [facility] Romulo Rees MD    2 years ago Hypertension, essential    [facility] Romulo Rees MD          Future Appointments              In 2 weeks LAB[facility]    In 3 weeks MD [facility], [facility]               hydroCHLOROthiazide (HYDRODIURIL) 25 MG tablet 90 tablet 0     Sig: Take 1 tablet (25 mg total) by mouth once daily.       Cardiovascular: Diuretics - Thiazide Failed - 1/23/2020  8:25 AM        Failed - Last BP in normal range within 360 days     BP Readings from Last 3 Encounters:   07/18/19 (!) 151/90   01/25/19 124/72   12/06/17 120/75              Failed - Ca in  normal range and within 360 days     Calcium   Date Value Ref Range Status   01/25/2019 10.0 8.7 - 10.5 mg/dL Final   01/22/2019 9.7 8.7 - 10.5 mg/dL Final   11/08/2017 9.8 8.7 - 10.5 mg/dL Final              Failed - Cr is 1.4 or below and within 180 days     Creatinine   Date Value Ref Range Status   01/25/2019 0.8 0.5 - 1.4 mg/dL Final   01/22/2019 0.8 0.5 - 1.4 mg/dL Final   11/08/2017 0.7 0.5 - 1.4 mg/dL Final              Failed - K in normal range and within 180 days     Potassium   Date Value Ref Range Status   01/25/2019 4.0 3.5 - 5.1 mmol/L Final   01/22/2019 4.2 3.5 - 5.1 mmol/L Final   11/08/2017 4.2 3.5 - 5.1 mmol/L Final              Failed - Na in normal range and within 180 days     Sodium   Date Value Ref Range Status   01/25/2019 136 136 - 145 mmol/L Final   01/22/2019 138 136 - 145 mmol/L Final   11/08/2017 139 136 - 145 mmol/L Final              Failed - eGFR within 180 days     eGFR if non    Date Value Ref Range Status   01/25/2019 >60 >60 mL/min/1.73 m^2 Final     Comment:     Calculation used to obtain the estimated glomerular filtration  rate (eGFR) is the CKD-EPI equation.      01/22/2019 >60 >60 mL/min/1.73 m^2 Final     Comment:     Calculation used to obtain the estimated glomerular filtration  rate (eGFR) is the CKD-EPI equation.      11/08/2017 >60 >60 mL/min/1.73 m^2 Final     Comment:     Calculation used to obtain the estimated glomerular filtration  rate (eGFR) is the CKD-EPI equation.        eGFR if    Date Value Ref Range Status   01/25/2019 >60 >60 mL/min/1.73 m^2 Final   01/22/2019 >60 >60 mL/min/1.73 m^2 Final   11/08/2017 >60 >60 mL/min/1.73 m^2 Final              Passed - Patient is at least 18 years old        Passed - Office visit in past 12 months or future 90 days     Recent Outpatient Visits            6 months ago Pain of toe of right foot    [facility] Rey Griggs DPM    12 months ago Annual physical exam    [facility] Romulo Rees MD    2 years ago Weight loss    [facility] Romulo Rees MD    2 years ago Annual physical exam    [facility] Romulo Rees MD    2 years ago [facility], [facility]          Future Appointments              In 2 weeks LAB, AP[facility]MENT [facility], [facility]    In 3 weeks MD [facility] - [facility], [facility]

## 2020-01-25 ENCOUNTER — TELEPHONE (OUTPATIENT)
Dept: INTERNAL MEDICINE | Facility: CLINIC | Age: 60
End: 2020-01-25

## 2020-01-25 RX ORDER — LOSARTAN POTASSIUM 50 MG/1
100 TABLET ORAL DAILY
Qty: 60 TABLET | Refills: 1 | Status: SHIPPED | OUTPATIENT
Start: 2020-01-25 | End: 2020-02-18 | Stop reason: SDUPTHER

## 2020-01-25 NOTE — TELEPHONE ENCOUNTER
----- Message from Mini Zhang sent at 1/24/2020  5:27 PM CST -----  Hi. Dr. Gracia this is Mini from the primary care pharmacy. We received a new prescription for Losartan 100 mg tablets and this particular medication is not available for us to order. If you feel a change is necessary please send a new prescription to the primary care pharmacy. Thanks!

## 2020-01-29 ENCOUNTER — PATIENT MESSAGE (OUTPATIENT)
Dept: ADMINISTRATIVE | Facility: OTHER | Age: 60
End: 2020-01-29

## 2020-01-31 ENCOUNTER — PATIENT MESSAGE (OUTPATIENT)
Dept: ADMINISTRATIVE | Facility: OTHER | Age: 60
End: 2020-01-31

## 2020-02-04 ENCOUNTER — PATIENT OUTREACH (OUTPATIENT)
Dept: ADMINISTRATIVE | Facility: HOSPITAL | Age: 60
End: 2020-02-04

## 2020-02-07 ENCOUNTER — LAB VISIT (OUTPATIENT)
Dept: LAB | Facility: HOSPITAL | Age: 60
End: 2020-02-07
Attending: FAMILY MEDICINE
Payer: COMMERCIAL

## 2020-02-07 DIAGNOSIS — Z00.00 ANNUAL PHYSICAL EXAM: ICD-10-CM

## 2020-02-07 DIAGNOSIS — E78.5 HYPERLIPIDEMIA, UNSPECIFIED HYPERLIPIDEMIA TYPE: ICD-10-CM

## 2020-02-07 DIAGNOSIS — Z12.5 PROSTATE CANCER SCREENING: ICD-10-CM

## 2020-02-07 DIAGNOSIS — I10 HYPERTENSION, ESSENTIAL: ICD-10-CM

## 2020-02-07 LAB
ALBUMIN SERPL BCP-MCNC: 4.1 G/DL (ref 3.5–5.2)
ALP SERPL-CCNC: 82 U/L (ref 55–135)
ALT SERPL W/O P-5'-P-CCNC: 59 U/L (ref 10–44)
ANION GAP SERPL CALC-SCNC: 10 MMOL/L (ref 8–16)
AST SERPL-CCNC: 47 U/L (ref 10–40)
BILIRUB SERPL-MCNC: 1 MG/DL (ref 0.1–1)
BUN SERPL-MCNC: 16 MG/DL (ref 6–20)
CALCIUM SERPL-MCNC: 9.6 MG/DL (ref 8.7–10.5)
CHLORIDE SERPL-SCNC: 103 MMOL/L (ref 95–110)
CHOLEST SERPL-MCNC: 178 MG/DL (ref 120–199)
CHOLEST/HDLC SERPL: 2.2 {RATIO} (ref 2–5)
CO2 SERPL-SCNC: 26 MMOL/L (ref 23–29)
COMPLEXED PSA SERPL-MCNC: 1.7 NG/ML (ref 0–4)
CREAT SERPL-MCNC: 0.8 MG/DL (ref 0.5–1.4)
EST. GFR  (AFRICAN AMERICAN): >60 ML/MIN/1.73 M^2
EST. GFR  (NON AFRICAN AMERICAN): >60 ML/MIN/1.73 M^2
GLUCOSE SERPL-MCNC: 99 MG/DL (ref 70–110)
HDLC SERPL-MCNC: 80 MG/DL (ref 40–75)
HDLC SERPL: 44.9 % (ref 20–50)
LDLC SERPL CALC-MCNC: 89.4 MG/DL (ref 63–159)
NONHDLC SERPL-MCNC: 98 MG/DL
POTASSIUM SERPL-SCNC: 4.1 MMOL/L (ref 3.5–5.1)
PROT SERPL-MCNC: 7.6 G/DL (ref 6–8.4)
SODIUM SERPL-SCNC: 139 MMOL/L (ref 136–145)
TRIGL SERPL-MCNC: 43 MG/DL (ref 30–150)

## 2020-02-07 PROCEDURE — 80061 LIPID PANEL: CPT

## 2020-02-07 PROCEDURE — 84153 ASSAY OF PSA TOTAL: CPT

## 2020-02-07 PROCEDURE — 36415 COLL VENOUS BLD VENIPUNCTURE: CPT

## 2020-02-07 PROCEDURE — 80053 COMPREHEN METABOLIC PANEL: CPT

## 2020-02-18 ENCOUNTER — TELEPHONE (OUTPATIENT)
Dept: INTERNAL MEDICINE | Facility: CLINIC | Age: 60
End: 2020-02-18

## 2020-02-18 ENCOUNTER — OFFICE VISIT (OUTPATIENT)
Dept: INTERNAL MEDICINE | Facility: CLINIC | Age: 60
End: 2020-02-18
Attending: FAMILY MEDICINE
Payer: COMMERCIAL

## 2020-02-18 ENCOUNTER — IMMUNIZATION (OUTPATIENT)
Dept: INTERNAL MEDICINE | Facility: CLINIC | Age: 60
End: 2020-02-18
Payer: COMMERCIAL

## 2020-02-18 ENCOUNTER — CLINICAL SUPPORT (OUTPATIENT)
Dept: INTERNAL MEDICINE | Facility: CLINIC | Age: 60
End: 2020-02-18
Payer: COMMERCIAL

## 2020-02-18 VITALS
BODY MASS INDEX: 25.46 KG/M2 | WEIGHT: 168 LBS | HEIGHT: 68 IN | DIASTOLIC BLOOD PRESSURE: 80 MMHG | TEMPERATURE: 98 F | HEART RATE: 73 BPM | SYSTOLIC BLOOD PRESSURE: 112 MMHG | OXYGEN SATURATION: 97 %

## 2020-02-18 DIAGNOSIS — K63.5 POLYP OF COLON, UNSPECIFIED PART OF COLON, UNSPECIFIED TYPE: ICD-10-CM

## 2020-02-18 DIAGNOSIS — Z87.891 PERSONAL HISTORY OF NICOTINE DEPENDENCE: ICD-10-CM

## 2020-02-18 DIAGNOSIS — I25.10 ASCVD (ARTERIOSCLEROTIC CARDIOVASCULAR DISEASE): ICD-10-CM

## 2020-02-18 DIAGNOSIS — K76.0 HEPATIC STEATOSIS: ICD-10-CM

## 2020-02-18 DIAGNOSIS — Z00.00 ANNUAL PHYSICAL EXAM: Primary | ICD-10-CM

## 2020-02-18 DIAGNOSIS — Z12.9 SCREENING FOR CANCER: ICD-10-CM

## 2020-02-18 DIAGNOSIS — I10 HYPERTENSION, ESSENTIAL: ICD-10-CM

## 2020-02-18 DIAGNOSIS — Z12.11 COLON CANCER SCREENING: ICD-10-CM

## 2020-02-18 DIAGNOSIS — E78.5 HYPERLIPIDEMIA, UNSPECIFIED HYPERLIPIDEMIA TYPE: ICD-10-CM

## 2020-02-18 DIAGNOSIS — I77.89 ASCENDING AORTA ENLARGEMENT: ICD-10-CM

## 2020-02-18 PROCEDURE — 3074F PR MOST RECENT SYSTOLIC BLOOD PRESSURE < 130 MM HG: ICD-10-PCS | Mod: CPTII,S$GLB,, | Performed by: FAMILY MEDICINE

## 2020-02-18 PROCEDURE — 99999 PR PBB SHADOW E&M-EST. PATIENT-LVL V: ICD-10-PCS | Mod: PBBFAC,,, | Performed by: FAMILY MEDICINE

## 2020-02-18 PROCEDURE — 99999 PR PBB SHADOW E&M-EST. PATIENT-LVL I: ICD-10-PCS | Mod: PBBFAC,,,

## 2020-02-18 PROCEDURE — 3079F DIAST BP 80-89 MM HG: CPT | Mod: CPTII,S$GLB,, | Performed by: FAMILY MEDICINE

## 2020-02-18 PROCEDURE — 3074F SYST BP LT 130 MM HG: CPT | Mod: CPTII,S$GLB,, | Performed by: FAMILY MEDICINE

## 2020-02-18 PROCEDURE — 90471 IMMUNIZATION ADMIN: CPT | Mod: S$GLB,,, | Performed by: FAMILY MEDICINE

## 2020-02-18 PROCEDURE — 99396 PR PREVENTIVE VISIT,EST,40-64: ICD-10-PCS | Mod: 25,S$GLB,, | Performed by: FAMILY MEDICINE

## 2020-02-18 PROCEDURE — 3079F PR MOST RECENT DIASTOLIC BLOOD PRESSURE 80-89 MM HG: ICD-10-PCS | Mod: CPTII,S$GLB,, | Performed by: FAMILY MEDICINE

## 2020-02-18 PROCEDURE — 90471 FLU VACCINE (QUAD) GREATER THAN OR EQUAL TO 3YO PRESERVATIVE FREE IM: ICD-10-PCS | Mod: S$GLB,,, | Performed by: FAMILY MEDICINE

## 2020-02-18 PROCEDURE — 90472 IMMUNIZATION ADMIN EACH ADD: CPT | Mod: S$GLB,,, | Performed by: FAMILY MEDICINE

## 2020-02-18 PROCEDURE — 99999 PR PBB SHADOW E&M-EST. PATIENT-LVL V: CPT | Mod: PBBFAC,,, | Performed by: FAMILY MEDICINE

## 2020-02-18 PROCEDURE — 99999 PR PBB SHADOW E&M-EST. PATIENT-LVL I: CPT | Mod: PBBFAC,,,

## 2020-02-18 PROCEDURE — 90686 FLU VACCINE (QUAD) GREATER THAN OR EQUAL TO 3YO PRESERVATIVE FREE IM: ICD-10-PCS | Mod: S$GLB,,, | Performed by: FAMILY MEDICINE

## 2020-02-18 PROCEDURE — 99396 PREV VISIT EST AGE 40-64: CPT | Mod: 25,S$GLB,, | Performed by: FAMILY MEDICINE

## 2020-02-18 PROCEDURE — 90686 IIV4 VACC NO PRSV 0.5 ML IM: CPT | Mod: S$GLB,,, | Performed by: FAMILY MEDICINE

## 2020-02-18 PROCEDURE — 90472 PR IMMUNIZ,ADMIN,EACH ADDL: ICD-10-PCS | Mod: S$GLB,,, | Performed by: FAMILY MEDICINE

## 2020-02-18 PROCEDURE — 90715 TDAP VACCINE GREATER THAN OR EQUAL TO 7YO IM: ICD-10-PCS | Mod: S$GLB,,, | Performed by: FAMILY MEDICINE

## 2020-02-18 PROCEDURE — 90715 TDAP VACCINE 7 YRS/> IM: CPT | Mod: S$GLB,,, | Performed by: FAMILY MEDICINE

## 2020-02-18 RX ORDER — HYDROCHLOROTHIAZIDE 25 MG/1
25 TABLET ORAL DAILY
Qty: 90 TABLET | Refills: 3 | Status: SHIPPED | OUTPATIENT
Start: 2020-02-18 | End: 2021-03-16 | Stop reason: SDUPTHER

## 2020-02-18 RX ORDER — ATORVASTATIN CALCIUM 10 MG/1
10 TABLET, FILM COATED ORAL DAILY
Qty: 90 TABLET | Refills: 3 | Status: SHIPPED | OUTPATIENT
Start: 2020-02-18 | End: 2021-03-19 | Stop reason: SDUPTHER

## 2020-02-18 RX ORDER — LOSARTAN POTASSIUM 50 MG/1
100 TABLET ORAL DAILY
Qty: 180 TABLET | Refills: 3 | Status: SHIPPED | OUTPATIENT
Start: 2020-02-18 | End: 2020-03-05

## 2020-02-18 RX ORDER — AMLODIPINE BESYLATE 10 MG/1
10 TABLET ORAL DAILY
Qty: 90 TABLET | Refills: 3 | Status: SHIPPED | OUTPATIENT
Start: 2020-02-18 | End: 2021-03-16 | Stop reason: SDUPTHER

## 2020-02-18 NOTE — TELEPHONE ENCOUNTER
----- Message from Carli Courtney sent at 2/18/2020  9:30 AM CST -----  Pt  Didn't want to  schedule any of his appointments on today , pt will call back to schedule when he gets his work schedule

## 2020-02-18 NOTE — PROGRESS NOTES
Subjective:       Patient ID: Kip Lauren is a 59 y.o. male.    Chief Complaint: Annual Exam (rx refill)    Established patient for an annual wellness check/physical exam and also chronic disease management. Specific complaints - see dictation and please see ROS.  P, S, Fm, Soc Hx's; Meds, allergies reviewed and reconciled.  Health maintenance file reviewed and addressed items due.        Review of Systems   Constitutional: Negative for activity change, chills, fatigue, fever and unexpected weight change.   HENT: Negative for congestion, hearing loss, rhinorrhea and trouble swallowing.    Eyes: Negative for discharge, redness and visual disturbance.   Respiratory: Negative for cough, chest tightness, shortness of breath and wheezing.    Cardiovascular: Negative for chest pain, palpitations and leg swelling.   Gastrointestinal: Negative for abdominal pain, blood in stool, constipation, diarrhea and vomiting.   Endocrine: Negative for polydipsia and polyuria.   Genitourinary: Negative for difficulty urinating, hematuria and urgency.   Musculoskeletal: Negative for arthralgias, back pain, gait problem, joint swelling, myalgias and neck pain.   Skin: Negative for color change and rash.   Neurological: Negative for tremors, speech difficulty, weakness, numbness and headaches.   Hematological: Negative for adenopathy. Does not bruise/bleed easily.   Psychiatric/Behavioral: Negative for behavioral problems, confusion, dysphoric mood and sleep disturbance. The patient is not nervous/anxious.        Objective:      Physical Exam   Constitutional: He appears well-developed and well-nourished.   HENT:   Head: Normocephalic.   Right Ear: Tympanic membrane, external ear and ear canal normal.   Left Ear: Tympanic membrane, external ear and ear canal normal.   Mouth/Throat: Oropharynx is clear and moist.   Eyes: Pupils are equal, round, and reactive to light.   Neck: Normal range of motion. Neck supple. Carotid bruit is  not present. No thyromegaly present.   Cardiovascular: Normal rate, regular rhythm, normal heart sounds and intact distal pulses. Exam reveals no gallop and no friction rub.   No murmur heard.  Pulmonary/Chest: Effort normal. He has decreased breath sounds.   Abdominal: Soft. He exhibits no distension and no mass. There is no tenderness. There is no rebound and no guarding.   Musculoskeletal: Normal range of motion. He exhibits no edema or tenderness.   Lymphadenopathy:     He has no cervical adenopathy.   Neurological: He is alert. He has normal strength. He displays normal reflexes. No cranial nerve deficit or sensory deficit. Coordination and gait normal.   Skin: Skin is warm and dry.   Psychiatric: He has a normal mood and affect. His behavior is normal. Judgment and thought content normal.   Nursing note and vitals reviewed.      Assessment:       1. Annual physical exam    2. Hypertension, essential    3. Hyperlipidemia, unspecified hyperlipidemia type    4. Hepatic steatosis    5. Polyp of colon, unspecified part of colon, unspecified type    6. Colon cancer screening    7. Personal history of nicotine dependence    8. Ascending aorta enlargement    9. ASCVD (arteriosclerotic cardiovascular disease)    10. Screening for cancer        Plan:     Medication List with Changes/Refills   Current Medications    AMMONIUM LACTATE 12 % CREA    Apply twice daily to affected parts both feet as needed.    ASPIRIN (ECOTRIN) 81 MG EC TABLET    Take 81 mg by mouth once daily.   Changed and/or Refilled Medications    Modified Medication Previous Medication    AMLODIPINE (NORVASC) 10 MG TABLET amLODIPine (NORVASC) 10 MG tablet       Take 1 tablet (10 mg total) by mouth once daily.    Take 1 tablet (10 mg total) by mouth once daily.    ATORVASTATIN (LIPITOR) 10 MG TABLET atorvastatin (LIPITOR) 10 MG tablet       Take 1 tablet (10 mg total) by mouth once daily.    Take 1 tablet (10 mg total) by mouth once daily.     HYDROCHLOROTHIAZIDE (HYDRODIURIL) 25 MG TABLET hydroCHLOROthiazide (HYDRODIURIL) 25 MG tablet       Take 1 tablet (25 mg total) by mouth once daily.    Take 1 tablet (25 mg total) by mouth once daily.    LOSARTAN (COZAAR) 50 MG TABLET losartan (COZAAR) 50 MG tablet       Take 2 tablets (100 mg total) by mouth once daily.    Take 2 tablets (100 mg total) by mouth once daily.     Kip was seen today for annual exam.    Diagnoses and all orders for this visit:    Annual physical exam    Hypertension, essential    Hyperlipidemia, unspecified hyperlipidemia type    Hepatic steatosis  -     Ambulatory referral/consult to Hepatology; Future    Polyp of colon, unspecified part of colon, unspecified type  -     Case request GI: COLONOSCOPY    Colon cancer screening  -     Case request GI: COLONOSCOPY    Personal history of nicotine dependence  -     CT Chest Lung Screening Low Dose; Future    Ascending aorta enlargement  Comments:  2017 Echo  Orders:  -     CT Chest Lung Screening Low Dose; Future  -     Ambulatory referral/consult to Cardiology; Future    ASCVD (arteriosclerotic cardiovascular disease)  -     Ambulatory referral/consult to Cardiology; Future    Screening for cancer  -     CT Chest Lung Screening Low Dose; Future    Other orders  -     amLODIPine (NORVASC) 10 MG tablet; Take 1 tablet (10 mg total) by mouth once daily.  -     atorvastatin (LIPITOR) 10 MG tablet; Take 1 tablet (10 mg total) by mouth once daily.  -     hydroCHLOROthiazide (HYDRODIURIL) 25 MG tablet; Take 1 tablet (25 mg total) by mouth once daily.  -     losartan (COZAAR) 50 MG tablet; Take 2 tablets (100 mg total) by mouth once daily.      See meds, orders, follow up, routing and instructions sections of encounter and AVS. Discussed with patient and provided on AVS.    Discussed diet and exercise and links provided on AVS for detailed information.    No new complaints today.  Patient still smoking.  Reviewed his numbers.  He has had elevated  liver function tests for some time.  Last ultrasound showed hepatic steatosis.  Would be nice to get a fibro scan at the hepatology Clinic and perhaps some reinforcement about diet, alcohol, etc.    Consult to Cardiology is concerning calcifications noted on previous CT scan of chest for screening purposes.  Will see what their cholesterol medication dosage recommendations are or for any further workup.  Patient has no cardiopulmonary symptoms at this time.

## 2020-03-05 ENCOUNTER — TELEPHONE (OUTPATIENT)
Dept: INTERNAL MEDICINE | Facility: CLINIC | Age: 60
End: 2020-03-05

## 2020-03-05 RX ORDER — LOSARTAN POTASSIUM 25 MG/1
100 TABLET ORAL DAILY
Qty: 120 TABLET | Refills: 5 | Status: SHIPPED | OUTPATIENT
Start: 2020-03-05 | End: 2020-04-24 | Stop reason: ALTCHOICE

## 2020-03-05 NOTE — TELEPHONE ENCOUNTER
----- Message from Jannette Kelly sent at 3/4/2020  3:27 PM CST -----  Good afternoon,    Losartan 50 mg is not available again unfortunately, as of now we have only 25 mg in our inventory. If it is appropriate, can you send a prescription for the 25 mg tablets? If not, can you send a prescription for a different medication. Appreciate your time. thanks

## 2020-04-24 ENCOUNTER — PATIENT MESSAGE (OUTPATIENT)
Dept: INTERNAL MEDICINE | Facility: CLINIC | Age: 60
End: 2020-04-24

## 2020-04-24 RX ORDER — LOSARTAN POTASSIUM 100 MG/1
100 TABLET ORAL DAILY
Qty: 90 TABLET | Refills: 0 | Status: CANCELLED | OUTPATIENT
Start: 2020-04-24

## 2020-04-24 RX ORDER — LOSARTAN POTASSIUM 100 MG/1
100 TABLET ORAL DAILY
Qty: 90 TABLET | Refills: 0 | Status: SHIPPED | OUTPATIENT
Start: 2020-04-24 | End: 2020-07-23 | Stop reason: SDUPTHER

## 2020-06-12 DIAGNOSIS — Z12.11 SPECIAL SCREENING FOR MALIGNANT NEOPLASMS, COLON: Primary | ICD-10-CM

## 2020-06-12 RX ORDER — POLYETHYLENE GLYCOL 3350, SODIUM SULFATE ANHYDROUS, SODIUM BICARBONATE, SODIUM CHLORIDE, POTASSIUM CHLORIDE 236; 22.74; 6.74; 5.86; 2.97 G/4L; G/4L; G/4L; G/4L; G/4L
4 POWDER, FOR SOLUTION ORAL ONCE
Qty: 4000 ML | Refills: 0 | Status: SHIPPED | OUTPATIENT
Start: 2020-06-12 | End: 2020-06-12

## 2020-08-03 ENCOUNTER — TELEPHONE (OUTPATIENT)
Dept: INTERNAL MEDICINE | Facility: CLINIC | Age: 60
End: 2020-08-03

## 2020-08-03 DIAGNOSIS — Z12.11 COLON CANCER SCREENING: Primary | ICD-10-CM

## 2020-08-03 NOTE — TELEPHONE ENCOUNTER
Patient reported to me that he could not afford the 3000 dollar co-pay for a colonoscopy.  He had a history of polyps in the past so that would be the ideal study.  At a minimum we can get a stool card to at least screen though I explained to him that this would be potentially insufficient given the history.  In any event he we agreed at the time being.

## 2020-08-11 ENCOUNTER — TELEPHONE (OUTPATIENT)
Dept: INTERNAL MEDICINE | Facility: CLINIC | Age: 60
End: 2020-08-11

## 2020-08-11 NOTE — TELEPHONE ENCOUNTER
Spoke with spouse, and she stated that the patient has not received the Fit kit mailed out to patient on 8-3-2020.  2nd Fit Kit mailed 8-    FitKit was given to patient on 8/11/2020 4:42 PM       Mailed to patient

## 2020-08-11 NOTE — TELEPHONE ENCOUNTER
----- Message from Dulce Mackay sent at 8/11/2020  1:23 PM CDT -----  Contact: denise(wife) [phone]  Wife was calling regarding the stool test kit. Please advise

## 2020-08-14 ENCOUNTER — LAB VISIT (OUTPATIENT)
Dept: LAB | Facility: HOSPITAL | Age: 60
End: 2020-08-14
Attending: FAMILY MEDICINE
Payer: COMMERCIAL

## 2020-08-14 DIAGNOSIS — Z12.11 COLON CANCER SCREENING: ICD-10-CM

## 2020-08-14 LAB — HEMOCCULT STL QL IA: NEGATIVE

## 2020-08-14 PROCEDURE — 82274 ASSAY TEST FOR BLOOD FECAL: CPT

## 2020-08-25 PROBLEM — Z53.20 COLONOSCOPY REFUSED: Status: ACTIVE | Noted: 2020-08-25

## 2020-10-11 DIAGNOSIS — I10 HYPERTENSION, ESSENTIAL: ICD-10-CM

## 2020-10-11 NOTE — TELEPHONE ENCOUNTER
Care Due:                  Date            Visit Type   Department     Provider  --------------------------------------------------------------------------------                                PHYSICAL -                              ESTABLISHED   [facility] INTERNAL  Last Visit: 02-      PATIENT      MEDICINE       SUSHMA FELDMAN  Next Visit: None Scheduled  None         None Found                                                            Last  Test          Frequency    Reason                     Performed    Due Date  --------------------------------------------------------------------------------    Cr..........  6 months...  hydroCHLOROthiazide......  02- 08-    K...........  6 months...  hydroCHLOROthiazide......  02- 08-    Na..........  6 months...  hydroCHLOROthiazide......  02- 08-    Powered by [employer]. Reference number: [id number]. 10/11/2020 5:14:39 AM   CDT

## 2020-10-12 RX ORDER — LOSARTAN POTASSIUM 100 MG/1
100 TABLET ORAL DAILY
Qty: 90 TABLET | Refills: 1 | Status: SHIPPED | OUTPATIENT
Start: 2020-10-12 | End: 2021-03-16 | Stop reason: SDUPTHER

## 2020-10-12 NOTE — PROGRESS NOTES
Refill Authorization Note   Kip Lauren is requesting a refill authorization.  Brief assessment and rationale for refill: Approve          Medication reconciliation completed: No   Comments:   Orders Placed This Encounter    losartan (COZAAR) 100 MG tablet      Requested Prescriptions   Signed Prescriptions Disp Refills    losartan (COZAAR) 100 MG tablet 90 tablet 1     Sig: Take 1 tablet (100 mg total) by mouth once daily.       Cardiovascular:  Angiotensin Receptor Blockers Passed - 10/12/2020  9:51 AM        Passed - Patient is at least 18 years old        Passed - Last BP in normal range within 360 days     BP Readings from Last 3 Encounters:   02/18/20 112/80   07/18/19 (!) 151/90   01/25/19 124/72              Passed - Office Visit within last 12 months or future 90 days.     Recent Outpatient Visits            7 months ago Annual physical exam    [facility] thomas Rees MD    1 year ago [facility] toe of right foot    [facility]    1 year ago Annual physical exam    [facility] Khushbu Rees MD    2 years ago Weight loss    [facility] thomas Rees MD    2 years ago Annual physical exam    [facility] Romulo Rees MD                    Passed - Cr is 1.3 or below and within 360 days     Creatinine   Date Value Ref Range Status   02/07/2020 0.8 0.5 - 1.4 mg/dL Final   01/25/2019 0.8 0.5 - 1.4 mg/dL Final   01/22/2019 0.8 0.5 - 1.4 mg/dL Final              Passed - K in normal range and within 360 days     Potassium   Date Value Ref Range Status   02/07/2020 4.1 3.5 - 5.1 mmol/L Final   01/25/2019 4.0 3.5 - 5.1 mmol/L Final   01/22/2019 4.2 3.5 - 5.1 mmol/L Final              Passed - eGFR within 360 days     eGFR if non    Date Value Ref Range Status   02/07/2020 >60.0 >60 mL/min/1.73 m^2 Final     Comment:     Calculation used to  obtain the estimated glomerular filtration  rate (eGFR) is the CKD-EPI equation.      01/25/2019 >60 >60 mL/min/1.73 m^2 Final     Comment:     Calculation used to obtain the estimated glomerular filtration  rate (eGFR) is the CKD-EPI equation.      01/22/2019 >60 >60 mL/min/1.73 m^2 Final     Comment:     Calculation used to obtain the estimated glomerular filtration  rate (eGFR) is the CKD-EPI equation.        eGFR if    Date Value Ref Range Status   02/07/2020 >60.0 >60 mL/min/1.73 m^2 Final   01/25/2019 >60 >60 mL/min/1.73 m^2 Final   01/22/2019 >60 >60 mL/min/1.73 m^2 Final                  Appointments  past 12m or future 3m with PCP    Date Provider   Last Visit   2/18/2020 Romulo Rees MD   Next Visit   Visit date not found Romulo Rees MD   ED visits in past 90 days: 0     Note composed:11:22 AM 10/12/2020

## 2021-03-02 ENCOUNTER — PATIENT OUTREACH (OUTPATIENT)
Dept: ADMINISTRATIVE | Facility: HOSPITAL | Age: 61
End: 2021-03-02

## 2021-03-05 ENCOUNTER — PATIENT MESSAGE (OUTPATIENT)
Dept: OPTOMETRY | Facility: CLINIC | Age: 61
End: 2021-03-05

## 2021-03-16 ENCOUNTER — OFFICE VISIT (OUTPATIENT)
Dept: INTERNAL MEDICINE | Facility: CLINIC | Age: 61
End: 2021-03-16
Attending: FAMILY MEDICINE
Payer: COMMERCIAL

## 2021-03-16 ENCOUNTER — LAB VISIT (OUTPATIENT)
Dept: LAB | Facility: HOSPITAL | Age: 61
End: 2021-03-16
Attending: FAMILY MEDICINE
Payer: COMMERCIAL

## 2021-03-16 VITALS
TEMPERATURE: 97 F | WEIGHT: 166 LBS | BODY MASS INDEX: 26.68 KG/M2 | RESPIRATION RATE: 18 BRPM | DIASTOLIC BLOOD PRESSURE: 80 MMHG | SYSTOLIC BLOOD PRESSURE: 130 MMHG | HEIGHT: 66 IN | OXYGEN SATURATION: 100 % | HEART RATE: 64 BPM

## 2021-03-16 DIAGNOSIS — E78.5 HYPERLIPIDEMIA, UNSPECIFIED HYPERLIPIDEMIA TYPE: ICD-10-CM

## 2021-03-16 DIAGNOSIS — Z12.2 ENCOUNTER FOR SCREENING FOR MALIGNANT NEOPLASM OF RESPIRATORY ORGANS: ICD-10-CM

## 2021-03-16 DIAGNOSIS — Z87.891 PERSONAL HISTORY OF NICOTINE DEPENDENCE: ICD-10-CM

## 2021-03-16 DIAGNOSIS — Z12.5 PROSTATE CANCER SCREENING: ICD-10-CM

## 2021-03-16 DIAGNOSIS — I77.89 ASCENDING AORTA ENLARGEMENT: ICD-10-CM

## 2021-03-16 DIAGNOSIS — K63.5 POLYP OF COLON, UNSPECIFIED PART OF COLON, UNSPECIFIED TYPE: ICD-10-CM

## 2021-03-16 DIAGNOSIS — I10 HYPERTENSION, ESSENTIAL: ICD-10-CM

## 2021-03-16 DIAGNOSIS — Z00.00 ANNUAL PHYSICAL EXAM: ICD-10-CM

## 2021-03-16 DIAGNOSIS — Z12.11 COLON CANCER SCREENING: ICD-10-CM

## 2021-03-16 DIAGNOSIS — Z00.00 ANNUAL PHYSICAL EXAM: Primary | ICD-10-CM

## 2021-03-16 LAB
ALBUMIN SERPL BCP-MCNC: 4.1 G/DL (ref 3.5–5.2)
ALP SERPL-CCNC: 79 U/L (ref 55–135)
ALT SERPL W/O P-5'-P-CCNC: 29 U/L (ref 10–44)
ANION GAP SERPL CALC-SCNC: 6 MMOL/L (ref 8–16)
AST SERPL-CCNC: 24 U/L (ref 10–40)
BILIRUB SERPL-MCNC: 1.2 MG/DL (ref 0.1–1)
BUN SERPL-MCNC: 11 MG/DL (ref 6–20)
CALCIUM SERPL-MCNC: 10 MG/DL (ref 8.7–10.5)
CHLORIDE SERPL-SCNC: 98 MMOL/L (ref 95–110)
CHOLEST SERPL-MCNC: 156 MG/DL (ref 120–199)
CHOLEST/HDLC SERPL: 2.3 {RATIO} (ref 2–5)
CO2 SERPL-SCNC: 32 MMOL/L (ref 23–29)
COMPLEXED PSA SERPL-MCNC: 4.7 NG/ML (ref 0–4)
CREAT SERPL-MCNC: 0.8 MG/DL (ref 0.5–1.4)
EST. GFR  (AFRICAN AMERICAN): >60 ML/MIN/1.73 M^2
EST. GFR  (NON AFRICAN AMERICAN): >60 ML/MIN/1.73 M^2
GLUCOSE SERPL-MCNC: 97 MG/DL (ref 70–110)
HDLC SERPL-MCNC: 69 MG/DL (ref 40–75)
HDLC SERPL: 44.2 % (ref 20–50)
LDLC SERPL CALC-MCNC: 77.8 MG/DL (ref 63–159)
NONHDLC SERPL-MCNC: 87 MG/DL
POTASSIUM SERPL-SCNC: 4.2 MMOL/L (ref 3.5–5.1)
PROT SERPL-MCNC: 7.8 G/DL (ref 6–8.4)
SODIUM SERPL-SCNC: 136 MMOL/L (ref 136–145)
TRIGL SERPL-MCNC: 46 MG/DL (ref 30–150)

## 2021-03-16 PROCEDURE — 80053 COMPREHEN METABOLIC PANEL: CPT | Performed by: FAMILY MEDICINE

## 2021-03-16 PROCEDURE — 99396 PR PREVENTIVE VISIT,EST,40-64: ICD-10-PCS | Mod: S$GLB,,, | Performed by: FAMILY MEDICINE

## 2021-03-16 PROCEDURE — 3075F SYST BP GE 130 - 139MM HG: CPT | Mod: CPTII,S$GLB,, | Performed by: FAMILY MEDICINE

## 2021-03-16 PROCEDURE — 80061 LIPID PANEL: CPT | Performed by: FAMILY MEDICINE

## 2021-03-16 PROCEDURE — 36415 COLL VENOUS BLD VENIPUNCTURE: CPT | Performed by: FAMILY MEDICINE

## 2021-03-16 PROCEDURE — 1125F PR PAIN SEVERITY QUANTIFIED, PAIN PRESENT: ICD-10-PCS | Mod: S$GLB,,, | Performed by: FAMILY MEDICINE

## 2021-03-16 PROCEDURE — 3008F BODY MASS INDEX DOCD: CPT | Mod: CPTII,S$GLB,, | Performed by: FAMILY MEDICINE

## 2021-03-16 PROCEDURE — 3079F PR MOST RECENT DIASTOLIC BLOOD PRESSURE 80-89 MM HG: ICD-10-PCS | Mod: CPTII,S$GLB,, | Performed by: FAMILY MEDICINE

## 2021-03-16 PROCEDURE — 99396 PREV VISIT EST AGE 40-64: CPT | Mod: S$GLB,,, | Performed by: FAMILY MEDICINE

## 2021-03-16 PROCEDURE — 3079F DIAST BP 80-89 MM HG: CPT | Mod: CPTII,S$GLB,, | Performed by: FAMILY MEDICINE

## 2021-03-16 PROCEDURE — 99999 PR PBB SHADOW E&M-EST. PATIENT-LVL V: CPT | Mod: PBBFAC,,, | Performed by: FAMILY MEDICINE

## 2021-03-16 PROCEDURE — 1125F AMNT PAIN NOTED PAIN PRSNT: CPT | Mod: S$GLB,,, | Performed by: FAMILY MEDICINE

## 2021-03-16 PROCEDURE — 84153 ASSAY OF PSA TOTAL: CPT | Performed by: FAMILY MEDICINE

## 2021-03-16 PROCEDURE — 3008F PR BODY MASS INDEX (BMI) DOCUMENTED: ICD-10-PCS | Mod: CPTII,S$GLB,, | Performed by: FAMILY MEDICINE

## 2021-03-16 PROCEDURE — 3075F PR MOST RECENT SYSTOLIC BLOOD PRESS GE 130-139MM HG: ICD-10-PCS | Mod: CPTII,S$GLB,, | Performed by: FAMILY MEDICINE

## 2021-03-16 PROCEDURE — 99999 PR PBB SHADOW E&M-EST. PATIENT-LVL V: ICD-10-PCS | Mod: PBBFAC,,, | Performed by: FAMILY MEDICINE

## 2021-03-16 RX ORDER — HYDROCHLOROTHIAZIDE 25 MG/1
25 TABLET ORAL DAILY
Qty: 90 TABLET | Refills: 3 | Status: SHIPPED | OUTPATIENT
Start: 2021-03-16 | End: 2022-03-08 | Stop reason: SDUPTHER

## 2021-03-16 RX ORDER — AMLODIPINE BESYLATE 10 MG/1
10 TABLET ORAL DAILY
Qty: 90 TABLET | Refills: 3 | Status: SHIPPED | OUTPATIENT
Start: 2021-03-16 | End: 2022-03-08 | Stop reason: SDUPTHER

## 2021-03-16 RX ORDER — LOSARTAN POTASSIUM 100 MG/1
100 TABLET ORAL DAILY
Qty: 90 TABLET | Refills: 1 | Status: SHIPPED | OUTPATIENT
Start: 2021-03-16 | End: 2021-10-19 | Stop reason: SDUPTHER

## 2021-03-18 ENCOUNTER — OFFICE VISIT (OUTPATIENT)
Dept: INTERNAL MEDICINE | Facility: CLINIC | Age: 61
End: 2021-03-18
Payer: COMMERCIAL

## 2021-03-18 ENCOUNTER — TELEPHONE (OUTPATIENT)
Dept: INTERNAL MEDICINE | Facility: CLINIC | Age: 61
End: 2021-03-18

## 2021-03-18 DIAGNOSIS — S39.012D ACUTE MYOFASCIAL STRAIN OF LUMBAR REGION, SUBSEQUENT ENCOUNTER: Primary | ICD-10-CM

## 2021-03-18 DIAGNOSIS — M54.42 ACUTE LEFT-SIDED LOW BACK PAIN WITH LEFT-SIDED SCIATICA: Primary | ICD-10-CM

## 2021-03-18 PROCEDURE — 99213 OFFICE O/P EST LOW 20 MIN: CPT | Mod: 95,,, | Performed by: INTERNAL MEDICINE

## 2021-03-18 PROCEDURE — 99213 PR OFFICE/OUTPT VISIT, EST, LEVL III, 20-29 MIN: ICD-10-PCS | Mod: 95,,, | Performed by: INTERNAL MEDICINE

## 2021-03-18 RX ORDER — METHYLPREDNISOLONE 4 MG/1
TABLET ORAL
Qty: 21 TABLET | Refills: 0 | Status: SHIPPED | OUTPATIENT
Start: 2021-03-18 | End: 2021-05-27 | Stop reason: SDUPTHER

## 2021-03-18 RX ORDER — HYDROCODONE BITARTRATE AND ACETAMINOPHEN 5; 325 MG/1; MG/1
1 TABLET ORAL EVERY 12 HOURS PRN
Qty: 14 TABLET | Refills: 0 | Status: SHIPPED | OUTPATIENT
Start: 2021-03-18 | End: 2022-01-18 | Stop reason: ALTCHOICE

## 2021-03-19 RX ORDER — ATORVASTATIN CALCIUM 10 MG/1
10 TABLET, FILM COATED ORAL DAILY
Qty: 90 TABLET | Refills: 3 | Status: SHIPPED | OUTPATIENT
Start: 2021-03-19 | End: 2021-03-27

## 2021-03-24 ENCOUNTER — HOSPITAL ENCOUNTER (OUTPATIENT)
Dept: RADIOLOGY | Facility: HOSPITAL | Age: 61
Discharge: HOME | End: 2021-03-24
Attending: FAMILY MEDICINE
Payer: COMMERCIAL

## 2021-03-24 DIAGNOSIS — Z12.2 ENCOUNTER FOR SCREENING FOR MALIGNANT NEOPLASM OF RESPIRATORY ORGANS: ICD-10-CM

## 2021-03-24 DIAGNOSIS — Z87.891 PERSONAL HISTORY OF NICOTINE DEPENDENCE: ICD-10-CM

## 2021-03-24 DIAGNOSIS — I77.89 ASCENDING AORTA ENLARGEMENT: ICD-10-CM

## 2021-03-24 PROCEDURE — 71271 CT THORAX LUNG CANCER SCR C-: CPT | Mod: 26,,, | Performed by: RADIOLOGY

## 2021-03-24 PROCEDURE — 71271 CT CHEST LUNG SCREENING LOW DOSE: ICD-10-PCS | Mod: 26,,, | Performed by: RADIOLOGY

## 2021-03-24 PROCEDURE — 71271 CT THORAX LUNG CANCER SCR C-: CPT | Mod: TC

## 2021-03-25 ENCOUNTER — TELEPHONE (OUTPATIENT)
Dept: INTERNAL MEDICINE | Facility: CLINIC | Age: 61
End: 2021-03-25

## 2021-03-27 ENCOUNTER — TELEPHONE (OUTPATIENT)
Dept: INTERNAL MEDICINE | Facility: CLINIC | Age: 61
End: 2021-03-27

## 2021-03-27 ENCOUNTER — IMMUNIZATION (OUTPATIENT)
Dept: PRIMARY CARE CLINIC | Facility: CLINIC | Age: 61
End: 2021-03-27
Payer: COMMERCIAL

## 2021-03-27 DIAGNOSIS — I10 HYPERTENSION, ESSENTIAL: ICD-10-CM

## 2021-03-27 DIAGNOSIS — E78.5 HYPERLIPIDEMIA, UNSPECIFIED HYPERLIPIDEMIA TYPE: ICD-10-CM

## 2021-03-27 DIAGNOSIS — Z87.891 PERSONAL HISTORY OF NICOTINE DEPENDENCE: ICD-10-CM

## 2021-03-27 DIAGNOSIS — Z23 NEED FOR VACCINATION: Primary | ICD-10-CM

## 2021-03-27 DIAGNOSIS — R97.20 ELEVATED PSA: Primary | ICD-10-CM

## 2021-03-27 DIAGNOSIS — Z12.2 ENCOUNTER FOR SCREENING FOR MALIGNANT NEOPLASM OF RESPIRATORY ORGANS: ICD-10-CM

## 2021-03-27 PROCEDURE — 91300 PR SARS-COV- 2 COVID-19 VACCINE, NO PRSV, 30MCG/0.3ML, IM: ICD-10-PCS | Mod: S$GLB,,, | Performed by: INTERNAL MEDICINE

## 2021-03-27 PROCEDURE — 0001A PR IMMUNIZ ADMIN, SARS-COV-2 COVID-19 VACC, 30MCG/0.3ML, 1ST DOSE: CPT | Mod: CV19,S$GLB,, | Performed by: INTERNAL MEDICINE

## 2021-03-27 PROCEDURE — 0001A PR IMMUNIZ ADMIN, SARS-COV-2 COVID-19 VACC, 30MCG/0.3ML, 1ST DOSE: ICD-10-PCS | Mod: CV19,S$GLB,, | Performed by: INTERNAL MEDICINE

## 2021-03-27 PROCEDURE — 91300 PR SARS-COV- 2 COVID-19 VACCINE, NO PRSV, 30MCG/0.3ML, IM: CPT | Mod: S$GLB,,, | Performed by: INTERNAL MEDICINE

## 2021-03-27 RX ORDER — ATORVASTATIN CALCIUM 20 MG/1
20 TABLET, FILM COATED ORAL DAILY
Qty: 90 TABLET | Refills: 1 | Status: SHIPPED | OUTPATIENT
Start: 2021-03-27 | End: 2021-10-19 | Stop reason: SDUPTHER

## 2021-03-27 RX ADMIN — Medication 0.3 ML: at 04:03

## 2021-04-12 ENCOUNTER — HOSPITAL ENCOUNTER (OUTPATIENT)
Dept: CARDIOLOGY | Facility: HOSPITAL | Age: 61
Discharge: HOME | End: 2021-04-12
Attending: FAMILY MEDICINE
Payer: COMMERCIAL

## 2021-04-12 VITALS
DIASTOLIC BLOOD PRESSURE: 80 MMHG | HEART RATE: 70 BPM | HEIGHT: 66 IN | SYSTOLIC BLOOD PRESSURE: 130 MMHG | BODY MASS INDEX: 26.68 KG/M2 | WEIGHT: 166 LBS

## 2021-04-12 DIAGNOSIS — I77.89 ASCENDING AORTA ENLARGEMENT: ICD-10-CM

## 2021-04-12 LAB
ASCENDING AORTA: 4.24 CM
AV INDEX (PROSTH): 0.99
AV MEAN GRADIENT: 3 MMHG
AV PEAK GRADIENT: 8 MMHG
AV VALVE AREA: 4.67 CM2
AV VELOCITY RATIO: 0.96
BSA FOR ECHO PROCEDURE: 1.87 M2
CV ECHO LV RWT: 0.48 CM
DOP CALC AO PEAK VEL: 1.37 M/S
DOP CALC AO VTI: 26.76 CM
DOP CALC LVOT AREA: 4.7 CM2
DOP CALC LVOT DIAMETER: 2.45 CM
DOP CALC LVOT PEAK VEL: 1.32 M/S
DOP CALC LVOT STROKE VOLUME: 125.06 CM3
DOP CALCLVOT PEAK VEL VTI: 26.54 CM
E WAVE DECELERATION TIME: 155.93 MSEC
E/A RATIO: 1.03
E/E' RATIO: 6.7 M/S
ECHO LV POSTERIOR WALL: 1.06 CM (ref 0.6–1.1)
EJECTION FRACTION: 65 %
FRACTIONAL SHORTENING: 37 % (ref 28–44)
INTERVENTRICULAR SEPTUM: 0.98 CM (ref 0.6–1.1)
IVRT: 97.05 MSEC
LA MAJOR: 5.83 CM
LA MINOR: 5.81 CM
LA WIDTH: 4.21 CM
LEFT ATRIUM SIZE: 3.19 CM
LEFT ATRIUM VOLUME INDEX MOD: 46.6 ML/M2
LEFT ATRIUM VOLUME INDEX: 35.9 ML/M2
LEFT ATRIUM VOLUME MOD: 86.21 CM3
LEFT ATRIUM VOLUME: 66.44 CM3
LEFT INTERNAL DIMENSION IN SYSTOLE: 2.78 CM (ref 2.1–4)
LEFT VENTRICLE DIASTOLIC VOLUME INDEX: 47.84 ML/M2
LEFT VENTRICLE DIASTOLIC VOLUME: 88.51 ML
LEFT VENTRICLE MASS INDEX: 83 G/M2
LEFT VENTRICLE SYSTOLIC VOLUME INDEX: 15.7 ML/M2
LEFT VENTRICLE SYSTOLIC VOLUME: 29.12 ML
LEFT VENTRICULAR INTERNAL DIMENSION IN DIASTOLE: 4.42 CM (ref 3.5–6)
LEFT VENTRICULAR MASS: 153.05 G
LV LATERAL E/E' RATIO: 5.58 M/S
LV SEPTAL E/E' RATIO: 8.38 M/S
MV PEAK A VEL: 0.65 M/S
MV PEAK E VEL: 0.67 M/S
MV STENOSIS PRESSURE HALF TIME: 45.22 MS
MV VALVE AREA P 1/2 METHOD: 4.87 CM2
PISA TR MAX VEL: 2.77 M/S
RA MAJOR: 4.81 CM
RA PRESSURE: 3 MMHG
RA WIDTH: 3.6 CM
RIGHT VENTRICULAR END-DIASTOLIC DIMENSION: 3.99 CM
RV TISSUE DOPPLER FREE WALL SYSTOLIC VELOCITY 1 (APICAL 4 CHAMBER VIEW): 14.92 CM/S
SINUS: 4.3 CM
STJ: 4 CM
TDI LATERAL: 0.12 M/S
TDI SEPTAL: 0.08 M/S
TDI: 0.1 M/S
TR MAX PG: 31 MMHG
TRICUSPID ANNULAR PLANE SYSTOLIC EXCURSION: 2.48 CM
TV REST PULMONARY ARTERY PRESSURE: 34 MMHG

## 2021-04-12 PROCEDURE — 93306 TTE W/DOPPLER COMPLETE: CPT

## 2021-04-12 PROCEDURE — 93306 ECHO (CUPID ONLY): ICD-10-PCS | Mod: 26,,, | Performed by: INTERNAL MEDICINE

## 2021-04-12 PROCEDURE — 93306 TTE W/DOPPLER COMPLETE: CPT | Mod: 26,,, | Performed by: INTERNAL MEDICINE

## 2021-04-13 ENCOUNTER — TELEPHONE (OUTPATIENT)
Dept: INTERNAL MEDICINE | Facility: CLINIC | Age: 61
End: 2021-04-13

## 2021-04-15 ENCOUNTER — PATIENT OUTREACH (OUTPATIENT)
Dept: ADMINISTRATIVE | Facility: OTHER | Age: 61
End: 2021-04-15

## 2021-04-17 ENCOUNTER — IMMUNIZATION (OUTPATIENT)
Dept: PRIMARY CARE CLINIC | Facility: CLINIC | Age: 61
End: 2021-04-17
Payer: COMMERCIAL

## 2021-04-17 DIAGNOSIS — Z23 NEED FOR VACCINATION: Primary | ICD-10-CM

## 2021-04-17 PROCEDURE — 0002A PR IMMUNIZ ADMIN, SARS-COV-2 COVID-19 VACC, 30MCG/0.3ML, 2ND DOSE: ICD-10-PCS | Mod: CV19,S$GLB,, | Performed by: INTERNAL MEDICINE

## 2021-04-17 PROCEDURE — 91300 PR SARS-COV- 2 COVID-19 VACCINE, NO PRSV, 30MCG/0.3ML, IM: ICD-10-PCS | Mod: S$GLB,,, | Performed by: INTERNAL MEDICINE

## 2021-04-17 PROCEDURE — 91300 PR SARS-COV- 2 COVID-19 VACCINE, NO PRSV, 30MCG/0.3ML, IM: CPT | Mod: S$GLB,,, | Performed by: INTERNAL MEDICINE

## 2021-04-17 PROCEDURE — 0002A PR IMMUNIZ ADMIN, SARS-COV-2 COVID-19 VACC, 30MCG/0.3ML, 2ND DOSE: CPT | Mod: CV19,S$GLB,, | Performed by: INTERNAL MEDICINE

## 2021-04-17 RX ADMIN — Medication 0.3 ML: at 11:04

## 2021-04-19 ENCOUNTER — OFFICE VISIT (OUTPATIENT)
Dept: CARDIOLOGY | Facility: CLINIC | Age: 61
End: 2021-04-19
Attending: FAMILY MEDICINE
Payer: COMMERCIAL

## 2021-04-19 ENCOUNTER — TELEPHONE (OUTPATIENT)
Dept: INTERNAL MEDICINE | Facility: CLINIC | Age: 61
End: 2021-04-19

## 2021-04-19 ENCOUNTER — TELEPHONE (OUTPATIENT)
Dept: CARDIOLOGY | Facility: CLINIC | Age: 61
End: 2021-04-19

## 2021-04-19 VITALS
DIASTOLIC BLOOD PRESSURE: 70 MMHG | WEIGHT: 171.31 LBS | OXYGEN SATURATION: 98 % | BODY MASS INDEX: 27.53 KG/M2 | HEART RATE: 73 BPM | SYSTOLIC BLOOD PRESSURE: 128 MMHG | HEIGHT: 66 IN

## 2021-04-19 DIAGNOSIS — R00.2 PALPITATIONS: Primary | ICD-10-CM

## 2021-04-19 DIAGNOSIS — E78.5 HYPERLIPIDEMIA, UNSPECIFIED HYPERLIPIDEMIA TYPE: ICD-10-CM

## 2021-04-19 DIAGNOSIS — F17.200 CURRENT SMOKER: ICD-10-CM

## 2021-04-19 DIAGNOSIS — R00.2 PALPITATIONS: ICD-10-CM

## 2021-04-19 DIAGNOSIS — I77.89 ASCENDING AORTA ENLARGEMENT: ICD-10-CM

## 2021-04-19 DIAGNOSIS — I25.10 CORONARY ARTERY DISEASE INVOLVING NATIVE CORONARY ARTERY OF NATIVE HEART WITHOUT ANGINA PECTORIS: Primary | ICD-10-CM

## 2021-04-19 DIAGNOSIS — I10 ESSENTIAL HYPERTENSION: ICD-10-CM

## 2021-04-19 PROCEDURE — 99999 PR PBB SHADOW E&M-EST. PATIENT-LVL V: ICD-10-PCS | Mod: PBBFAC,,, | Performed by: INTERNAL MEDICINE

## 2021-04-19 PROCEDURE — 99204 OFFICE O/P NEW MOD 45 MIN: CPT | Mod: ,,, | Performed by: INTERNAL MEDICINE

## 2021-04-19 PROCEDURE — 93010 ELECTROCARDIOGRAM REPORT: CPT | Mod: ,,, | Performed by: INTERNAL MEDICINE

## 2021-04-19 PROCEDURE — 99204 PR OFFICE/OUTPT VISIT, NEW, LEVL IV, 45-59 MIN: ICD-10-PCS | Mod: ,,, | Performed by: INTERNAL MEDICINE

## 2021-04-19 PROCEDURE — 99999 PR PBB SHADOW E&M-EST. PATIENT-LVL V: CPT | Mod: PBBFAC,,, | Performed by: INTERNAL MEDICINE

## 2021-04-19 PROCEDURE — 93010 EKG 12-LEAD: ICD-10-PCS | Mod: ,,, | Performed by: INTERNAL MEDICINE

## 2021-04-19 PROCEDURE — 93005 EKG 12-LEAD: ICD-10-PCS | Mod: S$GLB,,, | Performed by: INTERNAL MEDICINE

## 2021-04-19 PROCEDURE — 93005 ELECTROCARDIOGRAM TRACING: CPT | Mod: S$GLB,,, | Performed by: INTERNAL MEDICINE

## 2021-05-26 ENCOUNTER — TELEPHONE (OUTPATIENT)
Dept: INTERNAL MEDICINE | Facility: CLINIC | Age: 61
End: 2021-05-26

## 2021-05-27 ENCOUNTER — TELEPHONE (OUTPATIENT)
Dept: INTERNAL MEDICINE | Facility: CLINIC | Age: 61
End: 2021-05-27

## 2021-05-27 RX ORDER — METHYLPREDNISOLONE 4 MG/1
TABLET ORAL
Qty: 21 TABLET | Refills: 0 | Status: SHIPPED | OUTPATIENT
Start: 2021-05-27 | End: 2022-01-18 | Stop reason: ALTCHOICE

## 2021-06-09 ENCOUNTER — TELEPHONE (OUTPATIENT)
Dept: INTERNAL MEDICINE | Facility: CLINIC | Age: 61
End: 2021-06-09

## 2021-06-11 ENCOUNTER — OFFICE VISIT (OUTPATIENT)
Dept: INTERNAL MEDICINE | Facility: CLINIC | Age: 61
End: 2021-06-11
Attending: FAMILY MEDICINE
Payer: COMMERCIAL

## 2021-06-11 ENCOUNTER — TELEPHONE (OUTPATIENT)
Dept: INTERNAL MEDICINE | Facility: CLINIC | Age: 61
End: 2021-06-11

## 2021-06-11 VITALS — SYSTOLIC BLOOD PRESSURE: 110 MMHG | DIASTOLIC BLOOD PRESSURE: 70 MMHG | HEART RATE: 70 BPM

## 2021-06-11 DIAGNOSIS — R42 VERTIGO: ICD-10-CM

## 2021-06-11 DIAGNOSIS — I10 HYPERTENSION, ESSENTIAL: ICD-10-CM

## 2021-06-11 DIAGNOSIS — I25.10 ASCVD (ARTERIOSCLEROTIC CARDIOVASCULAR DISEASE): ICD-10-CM

## 2021-06-11 DIAGNOSIS — R42 DIZZINESS: Primary | ICD-10-CM

## 2021-06-11 DIAGNOSIS — R97.20 ELEVATED PSA: ICD-10-CM

## 2021-06-11 DIAGNOSIS — R41.3 MEMORY DEFICIT: ICD-10-CM

## 2021-06-11 PROCEDURE — 99214 OFFICE O/P EST MOD 30 MIN: CPT | Mod: S$GLB,,, | Performed by: FAMILY MEDICINE

## 2021-06-11 PROCEDURE — 99214 PR OFFICE/OUTPT VISIT, EST, LEVL IV, 30-39 MIN: ICD-10-PCS | Mod: S$GLB,,, | Performed by: FAMILY MEDICINE

## 2021-06-11 PROCEDURE — 99999 PR PBB SHADOW E&M-EST. PATIENT-LVL III: CPT | Mod: PBBFAC,,, | Performed by: FAMILY MEDICINE

## 2021-06-11 PROCEDURE — 99999 PR PBB SHADOW E&M-EST. PATIENT-LVL III: ICD-10-PCS | Mod: PBBFAC,,, | Performed by: FAMILY MEDICINE

## 2021-06-17 ENCOUNTER — PATIENT OUTREACH (OUTPATIENT)
Dept: ADMINISTRATIVE | Facility: OTHER | Age: 61
End: 2021-06-17

## 2021-06-21 ENCOUNTER — OFFICE VISIT (OUTPATIENT)
Dept: UROLOGY | Facility: CLINIC | Age: 61
End: 2021-06-21
Attending: FAMILY MEDICINE
Payer: COMMERCIAL

## 2021-06-21 VITALS
HEART RATE: 86 BPM | SYSTOLIC BLOOD PRESSURE: 141 MMHG | HEIGHT: 66 IN | BODY MASS INDEX: 27.25 KG/M2 | WEIGHT: 169.56 LBS | DIASTOLIC BLOOD PRESSURE: 80 MMHG

## 2021-06-21 DIAGNOSIS — R97.20 ELEVATED PSA: ICD-10-CM

## 2021-06-21 PROCEDURE — 99999 PR PBB SHADOW E&M-EST. PATIENT-LVL III: CPT | Mod: PBBFAC,,, | Performed by: UROLOGY

## 2021-06-21 PROCEDURE — 99204 OFFICE O/P NEW MOD 45 MIN: CPT | Mod: S$GLB,,, | Performed by: UROLOGY

## 2021-06-21 PROCEDURE — 99213 OFFICE O/P EST LOW 20 MIN: CPT | Mod: PBBFAC | Performed by: UROLOGY

## 2021-06-21 PROCEDURE — 99999 PR PBB SHADOW E&M-EST. PATIENT-LVL III: ICD-10-PCS | Mod: PBBFAC,,, | Performed by: UROLOGY

## 2021-06-21 PROCEDURE — 99204 PR OFFICE/OUTPT VISIT, NEW, LEVL IV, 45-59 MIN: ICD-10-PCS | Mod: S$GLB,,, | Performed by: UROLOGY

## 2021-06-21 RX ORDER — SULFAMETHOXAZOLE AND TRIMETHOPRIM 800; 160 MG/1; MG/1
TABLET ORAL
Qty: 4 TABLET | Refills: 0 | Status: SHIPPED | OUTPATIENT
Start: 2021-06-21 | End: 2022-01-18 | Stop reason: ALTCHOICE

## 2021-07-13 ENCOUNTER — PATIENT MESSAGE (OUTPATIENT)
Dept: ENDOSCOPY | Facility: HOSPITAL | Age: 61
End: 2021-07-13

## 2021-07-14 ENCOUNTER — TELEPHONE (OUTPATIENT)
Dept: UROLOGY | Facility: CLINIC | Age: 61
End: 2021-07-14

## 2021-07-20 ENCOUNTER — PROCEDURE VISIT (OUTPATIENT)
Dept: UROLOGY | Facility: CLINIC | Age: 61
End: 2021-07-20
Payer: COMMERCIAL

## 2021-07-20 VITALS
HEART RATE: 81 BPM | RESPIRATION RATE: 16 BRPM | TEMPERATURE: 98 F | WEIGHT: 169.88 LBS | DIASTOLIC BLOOD PRESSURE: 74 MMHG | BODY MASS INDEX: 26.66 KG/M2 | HEIGHT: 67 IN | SYSTOLIC BLOOD PRESSURE: 133 MMHG

## 2021-07-20 DIAGNOSIS — R97.20 ELEVATED PSA: Primary | ICD-10-CM

## 2021-07-20 PROCEDURE — 88341 PR IHC OR ICC EACH ADD'L SINGLE ANTIBODY  STAINPR: ICD-10-PCS | Mod: 26,,, | Performed by: PATHOLOGY

## 2021-07-20 PROCEDURE — 76872 TRANSRECTAL ULTRASOUND W/ BIOPSY: ICD-10-PCS | Mod: 26,S$GLB,, | Performed by: UROLOGY

## 2021-07-20 PROCEDURE — 55700 TRANSRECTAL ULTRASOUND W/ BIOPSY: CPT | Mod: S$GLB,,, | Performed by: UROLOGY

## 2021-07-20 PROCEDURE — 88342 IMHCHEM/IMCYTCHM 1ST ANTB: CPT | Mod: 26,,, | Performed by: PATHOLOGY

## 2021-07-20 PROCEDURE — 88305 TISSUE EXAM BY PATHOLOGIST: CPT | Mod: 59 | Performed by: PATHOLOGY

## 2021-07-20 PROCEDURE — 88342 CHG IMMUNOCYTOCHEMISTRY: ICD-10-PCS | Mod: 26,,, | Performed by: PATHOLOGY

## 2021-07-20 PROCEDURE — 76872 US TRANSRECTAL: CPT | Mod: 26,S$GLB,, | Performed by: UROLOGY

## 2021-07-20 PROCEDURE — 55700 TRANSRECTAL ULTRASOUND W/ BIOPSY: ICD-10-PCS | Mod: S$GLB,,, | Performed by: UROLOGY

## 2021-07-20 PROCEDURE — 88305 TISSUE EXAM BY PATHOLOGIST: ICD-10-PCS | Mod: 26,,, | Performed by: PATHOLOGY

## 2021-07-20 PROCEDURE — 88305 TISSUE EXAM BY PATHOLOGIST: CPT | Mod: 26,,, | Performed by: PATHOLOGY

## 2021-07-20 PROCEDURE — 88342 IMHCHEM/IMCYTCHM 1ST ANTB: CPT | Mod: 59 | Performed by: PATHOLOGY

## 2021-07-20 PROCEDURE — 88341 IMHCHEM/IMCYTCHM EA ADD ANTB: CPT | Performed by: PATHOLOGY

## 2021-07-20 PROCEDURE — 88341 IMHCHEM/IMCYTCHM EA ADD ANTB: CPT | Mod: 26,,, | Performed by: PATHOLOGY

## 2021-07-20 RX ORDER — LIDOCAINE HYDROCHLORIDE 10 MG/ML
20 INJECTION INFILTRATION; PERINEURAL
Status: COMPLETED | OUTPATIENT
Start: 2021-07-20 | End: 2021-07-20

## 2021-07-20 RX ORDER — LIDOCAINE HYDROCHLORIDE 20 MG/ML
JELLY TOPICAL
Status: COMPLETED | OUTPATIENT
Start: 2021-07-20 | End: 2021-07-20

## 2021-07-20 RX ADMIN — LIDOCAINE HYDROCHLORIDE 20 ML: 10 INJECTION INFILTRATION; PERINEURAL at 02:07

## 2021-07-20 RX ADMIN — LIDOCAINE HYDROCHLORIDE: 20 JELLY TOPICAL at 02:07

## 2021-07-26 LAB
COMMENT: NORMAL
FINAL PATHOLOGIC DIAGNOSIS: NORMAL
GROSS: NORMAL
Lab: NORMAL

## 2021-07-30 ENCOUNTER — TELEPHONE (OUTPATIENT)
Dept: UROLOGY | Facility: CLINIC | Age: 61
End: 2021-07-30

## 2021-07-30 DIAGNOSIS — R97.20 ELEVATED PSA: Primary | ICD-10-CM

## 2021-10-19 DIAGNOSIS — E78.5 HYPERLIPIDEMIA, UNSPECIFIED HYPERLIPIDEMIA TYPE: ICD-10-CM

## 2021-10-19 DIAGNOSIS — I10 HYPERTENSION, ESSENTIAL: ICD-10-CM

## 2021-10-19 RX ORDER — LOSARTAN POTASSIUM 100 MG/1
100 TABLET ORAL DAILY
Qty: 90 TABLET | Refills: 1 | Status: SHIPPED | OUTPATIENT
Start: 2021-10-19 | End: 2022-03-08 | Stop reason: SDUPTHER

## 2021-10-19 RX ORDER — ATORVASTATIN CALCIUM 20 MG/1
20 TABLET, FILM COATED ORAL DAILY
Qty: 90 TABLET | Refills: 1 | Status: SHIPPED | OUTPATIENT
Start: 2021-10-19 | End: 2022-05-18 | Stop reason: SDUPTHER

## 2021-10-22 ENCOUNTER — LAB VISIT (OUTPATIENT)
Dept: LAB | Facility: HOSPITAL | Age: 61
End: 2021-10-22
Attending: UROLOGY
Payer: COMMERCIAL

## 2021-10-22 DIAGNOSIS — R97.20 ELEVATED PSA: ICD-10-CM

## 2021-10-22 LAB — COMPLEXED PSA SERPL-MCNC: 2.1 NG/ML (ref 0–4)

## 2021-10-22 PROCEDURE — 36415 COLL VENOUS BLD VENIPUNCTURE: CPT | Performed by: UROLOGY

## 2021-10-22 PROCEDURE — 84153 ASSAY OF PSA TOTAL: CPT | Performed by: UROLOGY

## 2021-10-25 ENCOUNTER — OFFICE VISIT (OUTPATIENT)
Dept: UROLOGY | Facility: CLINIC | Age: 61
End: 2021-10-25
Payer: COMMERCIAL

## 2021-10-25 VITALS
HEART RATE: 80 BPM | HEIGHT: 67 IN | BODY MASS INDEX: 27 KG/M2 | DIASTOLIC BLOOD PRESSURE: 87 MMHG | SYSTOLIC BLOOD PRESSURE: 148 MMHG | WEIGHT: 172 LBS

## 2021-10-25 DIAGNOSIS — N40.0 BENIGN NON-NODULAR PROSTATIC HYPERPLASIA WITHOUT LOWER URINARY TRACT SYMPTOMS: Primary | ICD-10-CM

## 2021-10-25 PROCEDURE — 1160F PR REVIEW ALL MEDS BY PRESCRIBER/CLIN PHARMACIST DOCUMENTED: ICD-10-PCS | Mod: CPTII,S$GLB,, | Performed by: UROLOGY

## 2021-10-25 PROCEDURE — 1160F RVW MEDS BY RX/DR IN RCRD: CPT | Mod: CPTII,S$GLB,, | Performed by: UROLOGY

## 2021-10-25 PROCEDURE — 3079F DIAST BP 80-89 MM HG: CPT | Mod: CPTII,S$GLB,, | Performed by: UROLOGY

## 2021-10-25 PROCEDURE — 99213 PR OFFICE/OUTPT VISIT, EST, LEVL III, 20-29 MIN: ICD-10-PCS | Mod: S$GLB,,, | Performed by: UROLOGY

## 2021-10-25 PROCEDURE — 3008F PR BODY MASS INDEX (BMI) DOCUMENTED: ICD-10-PCS | Mod: CPTII,S$GLB,, | Performed by: UROLOGY

## 2021-10-25 PROCEDURE — 3077F PR MOST RECENT SYSTOLIC BLOOD PRESSURE >= 140 MM HG: ICD-10-PCS | Mod: CPTII,S$GLB,, | Performed by: UROLOGY

## 2021-10-25 PROCEDURE — 3079F PR MOST RECENT DIASTOLIC BLOOD PRESSURE 80-89 MM HG: ICD-10-PCS | Mod: CPTII,S$GLB,, | Performed by: UROLOGY

## 2021-10-25 PROCEDURE — 99999 PR PBB SHADOW E&M-EST. PATIENT-LVL III: ICD-10-PCS | Mod: PBBFAC,,, | Performed by: UROLOGY

## 2021-10-25 PROCEDURE — 4010F ACE/ARB THERAPY RXD/TAKEN: CPT | Mod: CPTII,S$GLB,, | Performed by: UROLOGY

## 2021-10-25 PROCEDURE — 1159F MED LIST DOCD IN RCRD: CPT | Mod: CPTII,S$GLB,, | Performed by: UROLOGY

## 2021-10-25 PROCEDURE — 99999 PR PBB SHADOW E&M-EST. PATIENT-LVL III: CPT | Mod: PBBFAC,,, | Performed by: UROLOGY

## 2021-10-25 PROCEDURE — 3077F SYST BP >= 140 MM HG: CPT | Mod: CPTII,S$GLB,, | Performed by: UROLOGY

## 2021-10-25 PROCEDURE — 1159F PR MEDICATION LIST DOCUMENTED IN MEDICAL RECORD: ICD-10-PCS | Mod: CPTII,S$GLB,, | Performed by: UROLOGY

## 2021-10-25 PROCEDURE — 3008F BODY MASS INDEX DOCD: CPT | Mod: CPTII,S$GLB,, | Performed by: UROLOGY

## 2021-10-25 PROCEDURE — 4010F PR ACE/ARB THEARPY RXD/TAKEN: ICD-10-PCS | Mod: CPTII,S$GLB,, | Performed by: UROLOGY

## 2021-10-25 PROCEDURE — 99213 OFFICE O/P EST LOW 20 MIN: CPT | Mod: S$GLB,,, | Performed by: UROLOGY

## 2021-11-03 ENCOUNTER — TELEPHONE (OUTPATIENT)
Dept: INTERNAL MEDICINE | Facility: CLINIC | Age: 61
End: 2021-11-03
Payer: COMMERCIAL

## 2021-11-03 DIAGNOSIS — B35.3 TINEA PEDIS, UNSPECIFIED LATERALITY: Primary | ICD-10-CM

## 2021-11-05 RX ORDER — ECONAZOLE NITRATE 10 MG/G
CREAM TOPICAL 2 TIMES DAILY
Qty: 30 G | Refills: 1 | Status: SHIPPED | OUTPATIENT
Start: 2021-11-05

## 2022-01-15 ENCOUNTER — PATIENT MESSAGE (OUTPATIENT)
Dept: INTERNAL MEDICINE | Facility: CLINIC | Age: 62
End: 2022-01-15
Payer: COMMERCIAL

## 2022-01-16 ENCOUNTER — PATIENT MESSAGE (OUTPATIENT)
Dept: INTERNAL MEDICINE | Facility: CLINIC | Age: 62
End: 2022-01-16
Payer: COMMERCIAL

## 2022-01-17 ENCOUNTER — PATIENT MESSAGE (OUTPATIENT)
Dept: INTERNAL MEDICINE | Facility: CLINIC | Age: 62
End: 2022-01-17
Payer: COMMERCIAL

## 2022-01-18 ENCOUNTER — OFFICE VISIT (OUTPATIENT)
Dept: INTERNAL MEDICINE | Facility: CLINIC | Age: 62
End: 2022-01-18
Attending: FAMILY MEDICINE
Payer: COMMERCIAL

## 2022-01-18 ENCOUNTER — HOSPITAL ENCOUNTER (OUTPATIENT)
Dept: RADIOLOGY | Facility: HOSPITAL | Age: 62
Discharge: HOME | End: 2022-01-18
Attending: FAMILY MEDICINE
Payer: COMMERCIAL

## 2022-01-18 VITALS
DIASTOLIC BLOOD PRESSURE: 82 MMHG | WEIGHT: 175.81 LBS | SYSTOLIC BLOOD PRESSURE: 130 MMHG | OXYGEN SATURATION: 98 % | BODY MASS INDEX: 27.6 KG/M2 | HEIGHT: 67 IN | HEART RATE: 77 BPM

## 2022-01-18 DIAGNOSIS — S69.91XA HAND INJURY, RIGHT, INITIAL ENCOUNTER: Primary | ICD-10-CM

## 2022-01-18 DIAGNOSIS — M77.8 EXTENSOR TENDINITIS OF HAND: ICD-10-CM

## 2022-01-18 DIAGNOSIS — S69.91XA HAND INJURY, RIGHT, INITIAL ENCOUNTER: ICD-10-CM

## 2022-01-18 PROCEDURE — 3079F DIAST BP 80-89 MM HG: CPT | Mod: CPTII,S$GLB,, | Performed by: FAMILY MEDICINE

## 2022-01-18 PROCEDURE — 99999 PR PBB SHADOW E&M-EST. PATIENT-LVL IV: ICD-10-PCS | Mod: PBBFAC,,, | Performed by: FAMILY MEDICINE

## 2022-01-18 PROCEDURE — 3075F PR MOST RECENT SYSTOLIC BLOOD PRESS GE 130-139MM HG: ICD-10-PCS | Mod: CPTII,S$GLB,, | Performed by: FAMILY MEDICINE

## 2022-01-18 PROCEDURE — 3008F PR BODY MASS INDEX (BMI) DOCUMENTED: ICD-10-PCS | Mod: CPTII,S$GLB,, | Performed by: FAMILY MEDICINE

## 2022-01-18 PROCEDURE — 73130 X-RAY EXAM OF HAND: CPT | Mod: 26,RT,, | Performed by: RADIOLOGY

## 2022-01-18 PROCEDURE — 99213 OFFICE O/P EST LOW 20 MIN: CPT | Mod: S$GLB,,, | Performed by: FAMILY MEDICINE

## 2022-01-18 PROCEDURE — 99213 PR OFFICE/OUTPT VISIT, EST, LEVL III, 20-29 MIN: ICD-10-PCS | Mod: S$GLB,,, | Performed by: FAMILY MEDICINE

## 2022-01-18 PROCEDURE — 1159F PR MEDICATION LIST DOCUMENTED IN MEDICAL RECORD: ICD-10-PCS | Mod: CPTII,S$GLB,, | Performed by: FAMILY MEDICINE

## 2022-01-18 PROCEDURE — 3008F BODY MASS INDEX DOCD: CPT | Mod: CPTII,S$GLB,, | Performed by: FAMILY MEDICINE

## 2022-01-18 PROCEDURE — 99999 PR PBB SHADOW E&M-EST. PATIENT-LVL IV: CPT | Mod: PBBFAC,,, | Performed by: FAMILY MEDICINE

## 2022-01-18 PROCEDURE — 1159F MED LIST DOCD IN RCRD: CPT | Mod: CPTII,S$GLB,, | Performed by: FAMILY MEDICINE

## 2022-01-18 PROCEDURE — 73130 XR HAND COMPLETE 3 VIEW RIGHT: ICD-10-PCS | Mod: 26,RT,, | Performed by: RADIOLOGY

## 2022-01-18 PROCEDURE — 1160F PR REVIEW ALL MEDS BY PRESCRIBER/CLIN PHARMACIST DOCUMENTED: ICD-10-PCS | Mod: CPTII,S$GLB,, | Performed by: FAMILY MEDICINE

## 2022-01-18 PROCEDURE — 1160F RVW MEDS BY RX/DR IN RCRD: CPT | Mod: CPTII,S$GLB,, | Performed by: FAMILY MEDICINE

## 2022-01-18 PROCEDURE — 73130 X-RAY EXAM OF HAND: CPT | Mod: TC,RT

## 2022-01-18 PROCEDURE — 3079F PR MOST RECENT DIASTOLIC BLOOD PRESSURE 80-89 MM HG: ICD-10-PCS | Mod: CPTII,S$GLB,, | Performed by: FAMILY MEDICINE

## 2022-01-18 PROCEDURE — 3075F SYST BP GE 130 - 139MM HG: CPT | Mod: CPTII,S$GLB,, | Performed by: FAMILY MEDICINE

## 2022-01-18 RX ORDER — DICLOFENAC SODIUM 75 MG/1
75 TABLET, DELAYED RELEASE ORAL 2 TIMES DAILY PRN
Qty: 60 TABLET | Refills: 0 | Status: SHIPPED | OUTPATIENT
Start: 2022-01-18

## 2022-01-18 NOTE — PROGRESS NOTES
Subjective:       Patient ID: Kip Lauren is a 61 y.o. male.    Chief Complaint: No chief complaint on file.    Right hand pain, dorsal, 3 weeks.  No specific injury.  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  Denies any redness.  There is some swelling dorsally.  Taking over-the-counter anti-inflammatory.  No improvement.  No fever, chills or other joints involved.  No numbness or tingling.    Review of Systems   Constitutional: Negative for chills, fatigue, fever and unexpected weight change.   HENT: Negative for congestion and trouble swallowing.    Eyes: Negative for redness and visual disturbance.   Respiratory: Negative for cough, chest tightness and shortness of breath.    Cardiovascular: Negative for chest pain, palpitations and leg swelling.   Gastrointestinal: Negative for abdominal pain and blood in stool.   Genitourinary: Negative for difficulty urinating and hematuria.   Musculoskeletal: Positive for arthralgias and joint swelling. Negative for back pain, gait problem, myalgias and neck pain.   Skin: Negative for color change and rash.   Neurological: Negative for tremors, speech difficulty, weakness, numbness and headaches.   Hematological: Negative for adenopathy. Does not bruise/bleed easily.   Psychiatric/Behavioral: Negative for behavioral problems, confusion and sleep disturbance. The patient is not nervous/anxious.        Objective:      Physical Exam  Vitals and nursing note reviewed.   Constitutional:       General: He is not in acute distress.     Appearance: He is well-developed and well-nourished.   Pulmonary:      Effort: Pulmonary effort is normal.   Musculoskeletal:         General: No edema.      Right hand: Swelling and tenderness present. Decreased range of motion. Normal sensation. Normal capillary refill. Normal pulse.      Left hand: Normal.      Cervical back: Neck supple.      Right lower leg: No edema.      Left lower leg: No edema.      Comments:  Tenderness TP and with resisted extension to R III. Flexor tendons intact and NT. DNVI.    Swelling to dorsum 3rd MCP and less so to 2nd. No redness or warmth. No visible skin infection.   Skin:     General: Skin is warm and dry.      Findings: No rash.   Neurological:      Mental Status: He is alert and oriented to person, place, and time.   Psychiatric:         Mood and Affect: Mood and affect normal.         Behavior: Behavior normal.         Thought Content: Thought content normal.         Judgment: Judgment normal.         Assessment:       1. Hand injury, right, initial encounter    2. Extensor tendinitis of hand        Plan:     Medication List with Changes/Refills   New Medications    DICLOFENAC (VOLTAREN) 75 MG EC TABLET    Take 1 tablet (75 mg total) by mouth 2 (two) times daily as needed (pain or arthritis).   Current Medications    AMLODIPINE (NORVASC) 10 MG TABLET    Take 1 tablet (10 mg total) by mouth once daily.    AMMONIUM LACTATE 12 % CREA    Apply twice daily to affected parts both feet as needed.    ASPIRIN (ECOTRIN) 81 MG EC TABLET    Take 81 mg by mouth once daily.    ATORVASTATIN (LIPITOR) 20 MG TABLET    Take 1 tablet (20 mg total) by mouth once daily.    ECONAZOLE NITRATE 1 % CREAM    Apply topically 2 (two) times daily. 2-4 weeks    HYDROCHLOROTHIAZIDE (HYDRODIURIL) 25 MG TABLET    Take 1 tablet (25 mg total) by mouth once daily.    LOSARTAN (COZAAR) 100 MG TABLET    Take 1 tablet (100 mg total) by mouth once daily.   Discontinued Medications    HYDROCODONE-ACETAMINOPHEN (NORCO) 5-325 MG PER TABLET    Take 1 tablet by mouth every 12 (twelve) hours as needed for Pain.    METHYLPREDNISOLONE (MEDROL DOSEPACK) 4 MG TABLET    Use as directed per package instructions    SULFAMETHOXAZOLE-TRIMETHOPRIM 800-160MG (BACTRIM DS) 800-160 MG TAB    Start night before prostate biopsy: take 1 tablet by mouth every 12 hours for 4 doses only     Diagnoses and all orders for this visit:    Hand injury, right,  initial encounter  -     X-Ray Hand 3 view Right; Future  -     Ambulatory referral/consult to Orthopedics; Future  -     Cyclic Citrullinated Peptide Antibody, IgG; Future  -     Rheumatoid Factor; Future  -     C-Reactive Protein; Future  -     Sedimentation rate; Future    Extensor tendinitis of hand  -     Ambulatory referral/consult to Orthopedics; Future  -     Cyclic Citrullinated Peptide Antibody, IgG; Future  -     Rheumatoid Factor; Future  -     C-Reactive Protein; Future  -     Sedimentation rate; Future    Other orders  -     diclofenac (VOLTAREN) 75 MG EC tablet; Take 1 tablet (75 mg total) by mouth 2 (two) times daily as needed (pain or arthritis).      See meds, orders, follow up, routing and instructions sections of encounter and AVS. Discussed with patient and provided on AVS.    Synovitis suspected.  Possible tendinitis.  X-rays review and no acute fracture.  Note there is a lucency on 1 of the oblique views over the 3rd proximal phalanx.  I suspect this is an overlying skin artifact.  This area is not noted on any of the other three views.  Also, on physical examination this area was nontender.    GI and other precautions for diclofenac were provided.  Patient is due for follow-up sometime soon.  We will try to schedule he and his wife together.  Also reminded him he over due for a colonoscopy.  He will be due for his annual cardiology appointment sometime soon.

## 2022-01-19 ENCOUNTER — TELEPHONE (OUTPATIENT)
Dept: INTERNAL MEDICINE | Facility: CLINIC | Age: 62
End: 2022-01-19
Payer: COMMERCIAL

## 2022-01-19 NOTE — TELEPHONE ENCOUNTER
Pt states that he was put on a new medication for tendinitis, which is making him dizzy and light headed. Pt bp is elevated.  Pt wants to know if he should stop the medication or if the medication can be changed. (voltaren)

## 2022-01-19 NOTE — TELEPHONE ENCOUNTER
----- Message from Oumou Atkinson sent at 1/19/2022  2:12 PM CST -----  Contact: Patient [phone]  Good Afternoon  Patient is having side affect with the diclofenac (VOLTAREN) 75 MG EC tablet . Patient is foggy, Dry mouth and hot flash due to Rx    Please call and advise

## 2022-01-31 ENCOUNTER — OFFICE VISIT (OUTPATIENT)
Dept: ORTHOPEDICS | Facility: CLINIC | Age: 62
End: 2022-01-31
Payer: COMMERCIAL

## 2022-01-31 DIAGNOSIS — M77.8 EXTENSOR TENDINITIS OF HAND: ICD-10-CM

## 2022-01-31 DIAGNOSIS — S69.91XA HAND INJURY, RIGHT, INITIAL ENCOUNTER: ICD-10-CM

## 2022-01-31 DIAGNOSIS — M19.041 DEGENERATIVE ARTHRITIS OF METACARPOPHALANGEAL JOINT OF MIDDLE FINGER OF RIGHT HAND: Primary | ICD-10-CM

## 2022-01-31 PROCEDURE — 99999 PR PBB SHADOW E&M-EST. PATIENT-LVL II: CPT | Mod: PBBFAC,,, | Performed by: ORTHOPAEDIC SURGERY

## 2022-01-31 PROCEDURE — 99204 OFFICE O/P NEW MOD 45 MIN: CPT | Mod: S$GLB,,, | Performed by: ORTHOPAEDIC SURGERY

## 2022-01-31 PROCEDURE — 4010F PR ACE/ARB THEARPY RXD/TAKEN: ICD-10-PCS | Mod: CPTII,S$GLB,, | Performed by: ORTHOPAEDIC SURGERY

## 2022-01-31 PROCEDURE — 4010F ACE/ARB THERAPY RXD/TAKEN: CPT | Mod: CPTII,S$GLB,, | Performed by: ORTHOPAEDIC SURGERY

## 2022-01-31 PROCEDURE — 99999 PR PBB SHADOW E&M-EST. PATIENT-LVL II: ICD-10-PCS | Mod: PBBFAC,,, | Performed by: ORTHOPAEDIC SURGERY

## 2022-01-31 PROCEDURE — 99204 PR OFFICE/OUTPT VISIT, NEW, LEVL IV, 45-59 MIN: ICD-10-PCS | Mod: S$GLB,,, | Performed by: ORTHOPAEDIC SURGERY

## 2022-01-31 RX ORDER — MELOXICAM 15 MG/1
15 TABLET ORAL DAILY
Qty: 30 TABLET | Refills: 2 | Status: SHIPPED | OUTPATIENT
Start: 2022-01-31 | End: 2023-03-16
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
## 2022-03-08 ENCOUNTER — LAB VISIT (OUTPATIENT)
Dept: LAB | Facility: HOSPITAL | Age: 62
End: 2022-03-08
Attending: FAMILY MEDICINE
Payer: COMMERCIAL

## 2022-03-08 ENCOUNTER — OFFICE VISIT (OUTPATIENT)
Dept: INTERNAL MEDICINE | Facility: CLINIC | Age: 62
End: 2022-03-08
Attending: FAMILY MEDICINE
Payer: COMMERCIAL

## 2022-03-08 VITALS
WEIGHT: 169.56 LBS | OXYGEN SATURATION: 98 % | SYSTOLIC BLOOD PRESSURE: 128 MMHG | DIASTOLIC BLOOD PRESSURE: 82 MMHG | HEIGHT: 67 IN | BODY MASS INDEX: 26.61 KG/M2 | HEART RATE: 72 BPM

## 2022-03-08 DIAGNOSIS — R97.20 ELEVATED PSA: ICD-10-CM

## 2022-03-08 DIAGNOSIS — Z87.891 PERSONAL HISTORY OF NICOTINE DEPENDENCE: ICD-10-CM

## 2022-03-08 DIAGNOSIS — Z00.00 ANNUAL PHYSICAL EXAM: Primary | ICD-10-CM

## 2022-03-08 DIAGNOSIS — K63.5 POLYP OF COLON, UNSPECIFIED PART OF COLON, UNSPECIFIED TYPE: ICD-10-CM

## 2022-03-08 DIAGNOSIS — E78.5 HYPERLIPIDEMIA, UNSPECIFIED HYPERLIPIDEMIA TYPE: ICD-10-CM

## 2022-03-08 DIAGNOSIS — I25.10 ASCVD (ARTERIOSCLEROTIC CARDIOVASCULAR DISEASE): ICD-10-CM

## 2022-03-08 DIAGNOSIS — Z12.5 PROSTATE CANCER SCREENING: ICD-10-CM

## 2022-03-08 DIAGNOSIS — Z12.11 COLON CANCER SCREENING: ICD-10-CM

## 2022-03-08 DIAGNOSIS — I77.89 ASCENDING AORTA ENLARGEMENT: ICD-10-CM

## 2022-03-08 DIAGNOSIS — I10 HYPERTENSION, ESSENTIAL: ICD-10-CM

## 2022-03-08 DIAGNOSIS — Z00.00 ANNUAL PHYSICAL EXAM: ICD-10-CM

## 2022-03-08 LAB
CHOLEST SERPL-MCNC: 164 MG/DL (ref 120–199)
CHOLEST/HDLC SERPL: 1.9 {RATIO} (ref 2–5)
COMPLEXED PSA SERPL-MCNC: 2.8 NG/ML (ref 0–4)
ERYTHROCYTE [DISTWIDTH] IN BLOOD BY AUTOMATED COUNT: 13 % (ref 11.5–14.5)
HCT VFR BLD AUTO: 44.8 % (ref 40–54)
HDLC SERPL-MCNC: 85 MG/DL (ref 40–75)
HDLC SERPL: 51.8 % (ref 20–50)
HGB BLD-MCNC: 15 G/DL (ref 14–18)
LDLC SERPL CALC-MCNC: 69.4 MG/DL (ref 63–159)
MCH RBC QN AUTO: 32.3 PG (ref 27–31)
MCHC RBC AUTO-ENTMCNC: 33.5 G/DL (ref 32–36)
MCV RBC AUTO: 97 FL (ref 82–98)
NONHDLC SERPL-MCNC: 79 MG/DL
PLATELET # BLD AUTO: 324 K/UL (ref 150–450)
PMV BLD AUTO: 9.9 FL (ref 9.2–12.9)
RBC # BLD AUTO: 4.64 M/UL (ref 4.6–6.2)
TRIGL SERPL-MCNC: 48 MG/DL (ref 30–150)
WBC # BLD AUTO: 6.95 K/UL (ref 3.9–12.7)

## 2022-03-08 PROCEDURE — 4010F ACE/ARB THERAPY RXD/TAKEN: CPT | Mod: CPTII,S$GLB,, | Performed by: FAMILY MEDICINE

## 2022-03-08 PROCEDURE — 99999 PR PBB SHADOW E&M-EST. PATIENT-LVL IV: CPT | Mod: PBBFAC,,, | Performed by: FAMILY MEDICINE

## 2022-03-08 PROCEDURE — 3008F PR BODY MASS INDEX (BMI) DOCUMENTED: ICD-10-PCS | Mod: CPTII,S$GLB,, | Performed by: FAMILY MEDICINE

## 2022-03-08 PROCEDURE — 1160F PR REVIEW ALL MEDS BY PRESCRIBER/CLIN PHARMACIST DOCUMENTED: ICD-10-PCS | Mod: CPTII,S$GLB,, | Performed by: FAMILY MEDICINE

## 2022-03-08 PROCEDURE — 3079F DIAST BP 80-89 MM HG: CPT | Mod: CPTII,S$GLB,, | Performed by: FAMILY MEDICINE

## 2022-03-08 PROCEDURE — 4010F PR ACE/ARB THEARPY RXD/TAKEN: ICD-10-PCS | Mod: CPTII,S$GLB,, | Performed by: FAMILY MEDICINE

## 2022-03-08 PROCEDURE — 1159F MED LIST DOCD IN RCRD: CPT | Mod: CPTII,S$GLB,, | Performed by: FAMILY MEDICINE

## 2022-03-08 PROCEDURE — 85027 COMPLETE CBC AUTOMATED: CPT | Performed by: FAMILY MEDICINE

## 2022-03-08 PROCEDURE — 80061 LIPID PANEL: CPT | Performed by: FAMILY MEDICINE

## 2022-03-08 PROCEDURE — 99999 PR PBB SHADOW E&M-EST. PATIENT-LVL IV: ICD-10-PCS | Mod: PBBFAC,,, | Performed by: FAMILY MEDICINE

## 2022-03-08 PROCEDURE — 1160F RVW MEDS BY RX/DR IN RCRD: CPT | Mod: CPTII,S$GLB,, | Performed by: FAMILY MEDICINE

## 2022-03-08 PROCEDURE — 3079F PR MOST RECENT DIASTOLIC BLOOD PRESSURE 80-89 MM HG: ICD-10-PCS | Mod: CPTII,S$GLB,, | Performed by: FAMILY MEDICINE

## 2022-03-08 PROCEDURE — 3074F PR MOST RECENT SYSTOLIC BLOOD PRESSURE < 130 MM HG: ICD-10-PCS | Mod: CPTII,S$GLB,, | Performed by: FAMILY MEDICINE

## 2022-03-08 PROCEDURE — 99396 PR PREVENTIVE VISIT,EST,40-64: ICD-10-PCS | Mod: S$GLB,,, | Performed by: FAMILY MEDICINE

## 2022-03-08 PROCEDURE — 99396 PREV VISIT EST AGE 40-64: CPT | Mod: S$GLB,,, | Performed by: FAMILY MEDICINE

## 2022-03-08 PROCEDURE — 36415 COLL VENOUS BLD VENIPUNCTURE: CPT | Performed by: FAMILY MEDICINE

## 2022-03-08 PROCEDURE — 1159F PR MEDICATION LIST DOCUMENTED IN MEDICAL RECORD: ICD-10-PCS | Mod: CPTII,S$GLB,, | Performed by: FAMILY MEDICINE

## 2022-03-08 PROCEDURE — 3074F SYST BP LT 130 MM HG: CPT | Mod: CPTII,S$GLB,, | Performed by: FAMILY MEDICINE

## 2022-03-08 PROCEDURE — 3008F BODY MASS INDEX DOCD: CPT | Mod: CPTII,S$GLB,, | Performed by: FAMILY MEDICINE

## 2022-03-08 PROCEDURE — 84153 ASSAY OF PSA TOTAL: CPT | Performed by: FAMILY MEDICINE

## 2022-03-08 RX ORDER — AMLODIPINE BESYLATE 10 MG/1
10 TABLET ORAL DAILY
Qty: 90 TABLET | Refills: 3 | Status: SHIPPED | OUTPATIENT
Start: 2022-03-08 | End: 2023-03-16 | Stop reason: SDUPTHER

## 2022-03-08 RX ORDER — LOSARTAN POTASSIUM 100 MG/1
100 TABLET ORAL DAILY
Qty: 90 TABLET | Refills: 3 | Status: SHIPPED | OUTPATIENT
Start: 2022-03-08 | End: 2023-03-16 | Stop reason: SDUPTHER

## 2022-03-08 RX ORDER — HYDROCHLOROTHIAZIDE 25 MG/1
25 TABLET ORAL DAILY
Qty: 90 TABLET | Refills: 3 | Status: SHIPPED | OUTPATIENT
Start: 2022-03-08 | End: 2023-03-16 | Stop reason: SDUPTHER

## 2022-03-08 NOTE — PROGRESS NOTES
Subjective:       Patient ID: Kip Lauren is a 61 y.o. male.    Chief Complaint: Annual Exam    Established patient for an annual wellness check/physical exam and also chronic disease management. Specific complaints - see dictation and please see ROS.  P, S, Fm, Soc Hx's; Meds, allergies reviewed and reconciled.  Health maintenance file reviewed and addressed items due. Recent applicable lab, imaging and cardiovascular results reviewed.  Problem list items reviewed and modified or added entries (in the overview section) may not be transcribed into this encounter note due to note writer format.        Review of Systems   Constitutional: Negative for appetite change, chills, diaphoresis, fatigue and fever.   HENT: Negative for congestion, postnasal drip, rhinorrhea, sore throat and trouble swallowing.    Eyes: Negative for visual disturbance.   Respiratory: Negative for cough, choking, chest tightness, shortness of breath and wheezing.    Cardiovascular: Negative for chest pain and leg swelling.   Gastrointestinal: Negative for abdominal distention, abdominal pain, diarrhea, nausea and vomiting.   Genitourinary: Negative for difficulty urinating and hematuria.   Musculoskeletal: Negative for arthralgias and myalgias.   Skin: Negative for rash.   Neurological: Negative for weakness, light-headedness and headaches.   Psychiatric/Behavioral: Negative for confusion and dysphoric mood.       Objective:      Physical Exam  Vitals and nursing note reviewed.   Constitutional:       Appearance: He is well-developed. He is not diaphoretic.   Eyes:      General: No scleral icterus.  Neck:      Thyroid: No thyromegaly.      Vascular: No carotid bruit or JVD.      Trachea: No tracheal deviation.   Cardiovascular:      Rate and Rhythm: Normal rate and regular rhythm.      Heart sounds: Normal heart sounds. No murmur heard.    No friction rub. No gallop.   Pulmonary:      Effort: Pulmonary effort is normal. No respiratory  distress.      Breath sounds: Normal breath sounds. No wheezing or rales.   Abdominal:      General: There is no distension.      Palpations: Abdomen is soft. There is no mass.      Tenderness: There is no abdominal tenderness. There is no guarding or rebound.   Musculoskeletal:      Cervical back: Normal range of motion and neck supple.   Lymphadenopathy:      Cervical: No cervical adenopathy.   Skin:     General: Skin is warm and dry.      Findings: No erythema or rash.   Neurological:      Mental Status: He is alert and oriented to person, place, and time.      Cranial Nerves: No cranial nerve deficit.      Motor: No tremor.      Coordination: Coordination normal.      Gait: Gait normal.   Psychiatric:         Behavior: Behavior normal.         Thought Content: Thought content normal.         Judgment: Judgment normal.         Assessment:       1. Annual physical exam    2. Polyp of colon, unspecified part of colon, unspecified type    3. Colon cancer screening    4. Hypertension, essential    5. Hyperlipidemia, unspecified hyperlipidemia type    6. Prostate cancer screening    7. Ascending aorta enlargement    8. ASCVD (arteriosclerotic cardiovascular disease)    9. Personal history of nicotine dependence    10. Elevated PSA        Plan:     Medication List with Changes/Refills   Current Medications    AMMONIUM LACTATE 12 % CREA    Apply twice daily to affected parts both feet as needed.    ASPIRIN (ECOTRIN) 81 MG EC TABLET    Take 81 mg by mouth once daily.    ATORVASTATIN (LIPITOR) 20 MG TABLET    Take 1 tablet (20 mg total) by mouth once daily.    DICLOFENAC (VOLTAREN) 75 MG EC TABLET    Take 1 tablet (75 mg total) by mouth 2 (two) times daily as needed (pain or arthritis).    ECONAZOLE NITRATE 1 % CREAM    Apply topically 2 (two) times daily. 2-4 weeks    MELOXICAM (MOBIC) 15 MG TABLET    Take 1 tablet (15 mg total) by mouth once daily.   Changed and/or Refilled Medications    Modified Medication Previous  Medication    AMLODIPINE (NORVASC) 10 MG TABLET amLODIPine (NORVASC) 10 MG tablet       Take 1 tablet (10 mg total) by mouth once daily.    Take 1 tablet (10 mg total) by mouth once daily.    HYDROCHLOROTHIAZIDE (HYDRODIURIL) 25 MG TABLET hydroCHLOROthiazide (HYDRODIURIL) 25 MG tablet       Take 1 tablet (25 mg total) by mouth once daily.    Take 1 tablet (25 mg total) by mouth once daily.    LOSARTAN (COZAAR) 100 MG TABLET losartan (COZAAR) 100 MG tablet       Take 1 tablet (100 mg total) by mouth once daily.    Take 1 tablet (100 mg total) by mouth once daily.   Discontinued Medications    LOSARTAN (COZAAR) 25 MG TABLET    take 4 tablets by mouth once daily     Kip was seen today for annual exam.    Diagnoses and all orders for this visit:    Annual physical exam  -     Comprehensive Metabolic Panel; Future  -     Lipid Panel; Future  -     PSA, Screening; Future  -     CBC Without Differential; Future    Polyp of colon, unspecified part of colon, unspecified type  -     Case Request Endoscopy: COLONOSCOPY    Colon cancer screening  -     Case Request Endoscopy: COLONOSCOPY    Hypertension, essential  -     amLODIPine (NORVASC) 10 MG tablet; Take 1 tablet (10 mg total) by mouth once daily.  -     hydroCHLOROthiazide (HYDRODIURIL) 25 MG tablet; Take 1 tablet (25 mg total) by mouth once daily.  -     losartan (COZAAR) 100 MG tablet; Take 1 tablet (100 mg total) by mouth once daily.    Hyperlipidemia, unspecified hyperlipidemia type    Prostate cancer screening  -     PSA, Screening; Future    Ascending aorta enlargement  -     Ambulatory referral/consult to Cardiology; Future    ASCVD (arteriosclerotic cardiovascular disease)  -     Ambulatory referral/consult to Cardiology; Future    Personal history of nicotine dependence  -     CT Chest Lung Screening Low Dose; Future    Elevated PSA  -     PSA, Screening; Future      See meds, orders, follow up, routing and instructions sections of encounter and AVS. Discussed  with patient and provided on AVS.    Discussed diet and exercise and links provided on AVS for detailed information.    Did not get colonoscopy last year due to cost.  Encouraged to follow through this year with a history of polyps.    Monitor PSA closely and consider referral to Urology.  Small focus of abnormal cells noted on previous biopsy.  Had follow-up with urologist who described this as being benign when his diagnostic PSA subsequently came back without further elevation.

## 2022-03-08 NOTE — PATIENT INSTRUCTIONS
Schedule lab orders for today.      If not contacted in a couple weeks by colonoscopy scheduling department - call Colonoscopy Scheduling Number - [phone].     [facility] Pricing Office:  [phone]     Information about cholesterol, high blood pressure and healthy diet and activity recommendations can be found at the following links on the Internet:    http://www.nhlbi.nih.gov/health/health-topics/topics/hbc  http://www.nhlbi.nih.gov/health/educational/lose_wt/index.htm  Http://www.nhlbi.nih.gov/files/docs/public/heart/hbp_low.pdf  http://www.heart.org/HEARTORG/  http://diabetes.org/  https://www.cdc.gov/  Https://healthfinder.gov/  https://health.gov/dietaryguidelines/2015/guidelines/  https://health.gov/paguidelines/second-edition/pdf/Physical_Activity_Guidelines_2nd_edition.pdf

## 2022-03-22 ENCOUNTER — PATIENT MESSAGE (OUTPATIENT)
Dept: INTERNAL MEDICINE | Facility: CLINIC | Age: 62
End: 2022-03-22
Payer: COMMERCIAL

## 2022-03-23 ENCOUNTER — TELEPHONE (OUTPATIENT)
Dept: INTERNAL MEDICINE | Facility: CLINIC | Age: 62
End: 2022-03-23
Payer: COMMERCIAL

## 2022-03-23 NOTE — TELEPHONE ENCOUNTER
Called pt and spouse to confirm CT appt no answer LVM to call me [phone] is they have any questions or concerns

## 2022-03-23 NOTE — TELEPHONE ENCOUNTER
----- Message from Jazmine Morrell sent at 3/23/2022 10:05 AM CDT -----  Contact: Wife Kareen [phone]  Patient's wife Kareen is calling to schedule him a CT chest scan. They would like to be seen together

## 2022-03-24 ENCOUNTER — TELEPHONE (OUTPATIENT)
Dept: CARDIOLOGY | Facility: CLINIC | Age: 62
End: 2022-03-24
Payer: COMMERCIAL

## 2022-03-24 NOTE — TELEPHONE ENCOUNTER
----- Message from Alba Dong sent at 3/24/2022 10:29 AM CDT -----  Regarding: Nuclear test  Kareen [phone] pt wife called to [facility] his test before the visit and she would like to ghanshyam his nuclear test.    Thanks

## 2022-03-29 ENCOUNTER — TELEPHONE (OUTPATIENT)
Dept: INTERNAL MEDICINE | Facility: CLINIC | Age: 62
End: 2022-03-29
Payer: COMMERCIAL

## 2022-04-07 ENCOUNTER — TELEPHONE (OUTPATIENT)
Dept: CARDIOLOGY | Facility: HOSPITAL | Age: 62
End: 2022-04-07
Payer: COMMERCIAL

## 2022-04-19 ENCOUNTER — HOSPITAL ENCOUNTER (OUTPATIENT)
Dept: RADIOLOGY | Facility: HOSPITAL | Age: 62
Discharge: HOME | End: 2022-04-19
Attending: FAMILY MEDICINE

## 2022-04-19 DIAGNOSIS — Z87.891 PERSONAL HISTORY OF NICOTINE DEPENDENCE: ICD-10-CM

## 2022-04-19 PROCEDURE — 71271 CT THORAX LUNG CANCER SCR C-: CPT | Mod: 26,,, | Performed by: RADIOLOGY

## 2022-04-19 PROCEDURE — 71271 CT CHEST LUNG SCREENING LOW DOSE: ICD-10-PCS | Mod: 26,,, | Performed by: RADIOLOGY

## 2022-04-19 PROCEDURE — 71271 CT THORAX LUNG CANCER SCR C-: CPT | Mod: TC

## 2022-04-23 ENCOUNTER — TELEPHONE (OUTPATIENT)
Dept: INTERNAL MEDICINE | Facility: CLINIC | Age: 62
End: 2022-04-23
Payer: COMMERCIAL

## 2022-04-23 DIAGNOSIS — J43.2 CENTRILOBULAR EMPHYSEMA: ICD-10-CM

## 2022-04-23 DIAGNOSIS — Z87.891 PERSONAL HISTORY OF NICOTINE DEPENDENCE: Primary | ICD-10-CM

## 2022-04-23 DIAGNOSIS — I25.10 ASCVD (ARTERIOSCLEROTIC CARDIOVASCULAR DISEASE): ICD-10-CM

## 2022-04-23 NOTE — TELEPHONE ENCOUNTER
Please CALL patient and report that there were no concerning areas on chest CT scan. Any previously reported abnormalities remain unchanged. I recommend a repeat scan in 1 year to continue surveillance.     Please schedule follow up scan in 1 year - see orders. Thank you

## 2022-04-25 ENCOUNTER — TELEPHONE (OUTPATIENT)
Dept: INTERNAL MEDICINE | Facility: CLINIC | Age: 62
End: 2022-04-25
Payer: COMMERCIAL

## 2022-04-25 NOTE — TELEPHONE ENCOUNTER
----- Message from Kati Anna sent at 4/25/2022 10:54 AM CDT -----  Contact: [phone]  Patient is returning a phone call.  Who left a message for the patient: Cristal Thomas, PCT  Does patient know what this is regarding: yes   Would you like a call back, or a response through your [facility] portal?:   call back  Comments:

## 2022-04-25 NOTE — TELEPHONE ENCOUNTER
Called the pt to discuss test results. I left the pt a Vm asking for a call back. 1yr recall scheduled.

## 2022-05-18 DIAGNOSIS — E78.5 HYPERLIPIDEMIA, UNSPECIFIED HYPERLIPIDEMIA TYPE: ICD-10-CM

## 2022-05-18 RX ORDER — ATORVASTATIN CALCIUM 20 MG/1
20 TABLET, FILM COATED ORAL DAILY
Qty: 90 TABLET | Refills: 3 | Status: SHIPPED | OUTPATIENT
Start: 2022-05-18 | End: 2023-03-16 | Stop reason: SDUPTHER

## 2022-05-18 NOTE — TELEPHONE ENCOUNTER
Refill Authorization Note   Kip Lauren  is requesting a refill authorization.  Brief Assessment and Rationale for Refill:  Approve          Medication Reconciliation Completed: No   Comments:     No Care Gaps recommended.     Note composed:5:00 PM 05/18/2022

## 2022-05-18 NOTE — TELEPHONE ENCOUNTER
Care Due:                  Date            Visit Type   Department     Provider  --------------------------------------------------------------------------------                                EP -                              PRIMARY      [facility] INTERNAL  Last Visit: 03-      CARE ([facility])   MEDICINE       SUSHMA FELDMAN  Next Visit: None Scheduled  None         None Found                                                            Last  Test          Frequency    Reason                     Performed    Due Date  --------------------------------------------------------------------------------    CMP.........  12 months..  atorvastatin,              03- 03-                             hydroCHLOROthiazide,                             losartan.................    [employer] Embedded Care Gaps. Reference number: [id number]. 5/18/2022   3:49:46 AM CDT

## 2022-06-03 ENCOUNTER — PATIENT MESSAGE (OUTPATIENT)
Dept: INTERNAL MEDICINE | Facility: CLINIC | Age: 62
End: 2022-06-03
Payer: COMMERCIAL

## 2022-06-03 DIAGNOSIS — S99.929S INJURY OF TOE, UNSPECIFIED LATERALITY, SEQUELA: Primary | ICD-10-CM

## 2022-06-29 ENCOUNTER — HOSPITAL ENCOUNTER (OUTPATIENT)
Dept: RADIOLOGY | Facility: HOSPITAL | Age: 62
Discharge: HOME | End: 2022-06-29
Attending: ORTHOPAEDIC SURGERY
Payer: COMMERCIAL

## 2022-06-29 ENCOUNTER — OFFICE VISIT (OUTPATIENT)
Dept: ORTHOPEDICS | Facility: CLINIC | Age: 62
End: 2022-06-29
Attending: FAMILY MEDICINE
Payer: COMMERCIAL

## 2022-06-29 VITALS — BODY MASS INDEX: 26.61 KG/M2 | HEIGHT: 67 IN | WEIGHT: 169.56 LBS

## 2022-06-29 DIAGNOSIS — S99.929S INJURY OF TOE, UNSPECIFIED LATERALITY, SEQUELA: ICD-10-CM

## 2022-06-29 DIAGNOSIS — M20.61 TOE DEFORMITY, ACQUIRED, RIGHT: ICD-10-CM

## 2022-06-29 PROCEDURE — 3008F BODY MASS INDEX DOCD: CPT | Mod: CPTII,S$GLB,, | Performed by: ORTHOPAEDIC SURGERY

## 2022-06-29 PROCEDURE — 99203 OFFICE O/P NEW LOW 30 MIN: CPT | Mod: S$GLB,,, | Performed by: ORTHOPAEDIC SURGERY

## 2022-06-29 PROCEDURE — 1159F MED LIST DOCD IN RCRD: CPT | Mod: CPTII,S$GLB,, | Performed by: ORTHOPAEDIC SURGERY

## 2022-06-29 PROCEDURE — 4010F ACE/ARB THERAPY RXD/TAKEN: CPT | Mod: CPTII,S$GLB,, | Performed by: ORTHOPAEDIC SURGERY

## 2022-06-29 PROCEDURE — 4010F PR ACE/ARB THEARPY RXD/TAKEN: ICD-10-PCS | Mod: CPTII,S$GLB,, | Performed by: ORTHOPAEDIC SURGERY

## 2022-06-29 PROCEDURE — 1159F PR MEDICATION LIST DOCUMENTED IN MEDICAL RECORD: ICD-10-PCS | Mod: CPTII,S$GLB,, | Performed by: ORTHOPAEDIC SURGERY

## 2022-06-29 PROCEDURE — 99203 PR OFFICE/OUTPT VISIT, NEW, LEVL III, 30-44 MIN: ICD-10-PCS | Mod: S$GLB,,, | Performed by: ORTHOPAEDIC SURGERY

## 2022-06-29 PROCEDURE — 99999 PR PBB SHADOW E&M-EST. PATIENT-LVL III: ICD-10-PCS | Mod: PBBFAC,,, | Performed by: ORTHOPAEDIC SURGERY

## 2022-06-29 PROCEDURE — 99999 PR PBB SHADOW E&M-EST. PATIENT-LVL III: CPT | Mod: PBBFAC,,, | Performed by: ORTHOPAEDIC SURGERY

## 2022-06-29 PROCEDURE — 3008F PR BODY MASS INDEX (BMI) DOCUMENTED: ICD-10-PCS | Mod: CPTII,S$GLB,, | Performed by: ORTHOPAEDIC SURGERY
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
## 2022-07-01 ENCOUNTER — TELEPHONE (OUTPATIENT)
Dept: ORTHOPEDICS | Facility: CLINIC | Age: 62
End: 2022-07-01
Payer: COMMERCIAL

## 2022-07-01 NOTE — TELEPHONE ENCOUNTER
I spoke with pt explained to pt that we will not know the time until the day before. Pt verbalized understanding.          ----- Message from Lena Espinosa sent at 7/1/2022  1:31 PM CDT -----  Regarding: Procedure time  Contact: [phone]  Calling to get time of upcoming procedure on 7/14 so that transportation can be arranged. Please call and confirm.

## 2022-07-05 ENCOUNTER — ANESTHESIA EVENT (OUTPATIENT)
Dept: SURGERY | Facility: HOSPITAL | Age: 62
End: 2022-07-05
Payer: COMMERCIAL

## 2022-07-05 DIAGNOSIS — M20.61 TOE DEFORMITY, ACQUIRED, RIGHT: Primary | ICD-10-CM

## 2022-07-05 NOTE — ANESTHESIA PREPROCEDURE EVALUATION
07/05/2022  Kip Lauren is a 61 y.o., male.    Patient's chart and medical records reviewed.  OK to proceed to [facility].    Pre-operative evaluation for Procedure(s) (LRB):  AMPUTATION, TOE Fifth toe (Right)    Kip Lauren is a 61 y.o. male     Patient Active Problem List   Diagnosis    Decreased libido    Hammertoe    Smoker    Hypertension, essential    BPH (benign prostatic hyperplasia)    Hyperlipidemia    Colon polyps    ASCVD (arteriosclerotic cardiovascular disease)    Ascending aorta enlargement    Hepatic steatosis    History of radiofrequency ablation (RFA) procedure for cardiac arrhythmia    Personal history of nicotine dependence    Colonoscopy refused    Elevated PSA    Centrilobular emphysema       Review of patient's allergies indicates:  No Known Allergies    No current facility-administered medications on file prior to encounter.     Current Outpatient Medications on File Prior to Encounter   Medication Sig Dispense Refill    amLODIPine (NORVASC) 10 MG tablet Take 1 tablet (10 mg total) by mouth once daily. 90 tablet 3    ammonium lactate 12 % Crea Apply twice daily to affected parts both feet as needed. 140 g 11    aspirin (ECOTRIN) 81 MG EC tablet Take 81 mg by mouth once daily.      atorvastatin (LIPITOR) 20 MG tablet Take 1 tablet (20 mg total) by mouth once daily. 90 tablet 3    diclofenac (VOLTAREN) 75 MG EC tablet Take 1 tablet (75 mg total) by mouth 2 (two) times daily as needed (pain or arthritis). 60 tablet 0    econazole nitrate 1 % cream Apply topically 2 (two) times daily. 2-4 weeks 30 g 1    hydroCHLOROthiazide (HYDRODIURIL) 25 MG tablet Take 1 tablet (25 mg total) by mouth once daily. 90 tablet 3    losartan (COZAAR) 100 MG tablet Take 1 tablet (100 mg total) by mouth once daily. 90 tablet 3    meloxicam (MOBIC) 15 MG tablet Take 1 tablet  (15 mg total) by mouth once daily. 30 tablet 2       Past Surgical History:   Procedure Laterality Date    ABLATION OF DYSRHYTHMIC FOCUS  2000    FRACTURE SURGERY      hand injury      ORTHOPEDIC SURGERY         Social History     Socioeconomic History    Marital status:      Spouse name: Kareen    Number of children: 2   Occupational History    Occupation:      Employer: [employer]     Comment: [employer]   Tobacco Use    Smoking status: Current Every Day Smoker     Packs/day: 1.00     Years: 10.00     Pack years: 10.00    Smokeless tobacco: Never Used   Substance and Sexual Activity    Alcohol use: Yes     Comment: social    Drug use: No    Sexual activity: Yes     Partners: Female     Social Determinants of Health     Financial Resource Strain: Unknown    Difficulty of Paying Living Expenses: Patient refused   Food Insecurity: Unknown    Worried About Running Out of Food in the Last Year: Patient refused    Ran Out of Food in the Last Year: Patient refused   Transportation Needs: Unknown    Lack of Transportation (Medical): Patient refused    Lack of Transportation (Non-Medical): Patient refused   Physical Activity: Unknown    Days of Exercise per Week: Patient refused    Minutes of Exercise per Session: Patient refused   Stress: Unknown    Feeling of Stress : Patient refused   Social Connections: Unknown    Frequency of Communication with Friends and Family: Patient refused    Frequency of Social Gatherings with Friends and Family: Patient refused    Active Member of Clubs or Organizations: Patient refused    Attends Club or Organization Meetings: Patient refused    Marital Status:    Housing Stability: Unknown    Unable to Pay for Housing in the Last Year: Patient refused    Unstable Housing in the Last Year: Patient refused       EKG:  Normal sinus rhythm   Normal ECG   No previous ECGs available   Confirmed by MOO ALBRIGHT MD ([id number]) on  11/9/2017 9:40:21 AM     2D Echo:  No results found for this or any previous visit.     TTE:  · The ascending aorta is mildly dilated. 4.2cm Some measurements 4.0cm, unable to visualize old images so possibly no change.  · The left ventricle is normal in size with concentric remodeling and normal systolic function.  · Mild left atrial enlargement.  · The sinuses of Valsalva is mildly dilated. 4.3cm  · The aortic root is mildly dilated.  · The estimated ejection fraction is 65%.  · Normal left ventricular diastolic function.  · Normal right ventricular size with normal right ventricular systolic function.  · Normal central venous pressure (3 mmHg).  · The estimated PA systolic pressure is 34 mmHg.        Pre-op Assessment    I have reviewed the Patient Summary Reports.     I have reviewed the Nursing Notes. I have reviewed the NPO Status.   I have reviewed the Medications.     Review of Systems  Anesthesia Hx:  Denies Family Hx of Anesthesia complications.   Denies Personal Hx of Anesthesia complications.   Social:  Smoker    Cardiovascular:   Exercise tolerance: good Hypertension Denies Dysrhythmias.   Denies Angina.  Denies FAROOQ.    Pulmonary:   COPD    Renal/:   Denies Chronic Renal Disease.     Hepatic/GI:   Denies GERD.    Musculoskeletal:   Denies Arthritis.     Neurological:   Denies CVA. Denies Seizures.    Endocrine:   Denies Diabetes.        Physical Exam  General: Well nourished, Cooperative, Alert and Oriented    Airway:  Mallampati: II / I  Mouth Opening: Normal  TM Distance: Normal  Tongue: Normal  Neck ROM: Normal ROM    Dental:  Intact    Chest/Lungs:  Clear to auscultation, Normal Respiratory Rate    Heart:  Rate: Normal  Rhythm: Regular Rhythm        Anesthesia Plan  Type of Anesthesia, risks & benefits discussed:    Anesthesia Type: Gen Natural Airway, Regional  Intra-op Monitoring Plan: Standard ASA Monitors  Post Op Pain Control Plan: multimodal analgesia and peripheral nerve block  Induction:   IV  Informed Consent: Informed consent signed with the Patient and all parties understand the risks and agree with anesthesia plan.  All questions answered. Patient consented to blood products? Yes  ASA Score: 3    Ready For Surgery From Anesthesia Perspective.     .

## 2022-07-11 ENCOUNTER — PATIENT MESSAGE (OUTPATIENT)
Dept: INTERNAL MEDICINE | Facility: CLINIC | Age: 62
End: 2022-07-11
Payer: COMMERCIAL

## 2022-07-11 ENCOUNTER — TELEPHONE (OUTPATIENT)
Dept: INTERNAL MEDICINE | Facility: CLINIC | Age: 62
End: 2022-07-11
Payer: COMMERCIAL

## 2022-07-11 DIAGNOSIS — Z12.11 COLON CANCER SCREENING: Primary | ICD-10-CM

## 2022-07-11 DIAGNOSIS — K63.5 POLYP OF COLON, UNSPECIFIED PART OF COLON, UNSPECIFIED TYPE: ICD-10-CM

## 2022-07-11 NOTE — TELEPHONE ENCOUNTER
Patient wife called,  is having surgery so they are coming up to their annual deductible and she would like to proceed with her colonoscopy.  He is on the phone today as well.    We will replace the order and I gave them a call in instructions.  Placed is a screening exam at her request.  Will provide the pricing office number as well

## 2022-07-12 ENCOUNTER — OFFICE VISIT (OUTPATIENT)
Dept: ORTHOPEDICS | Facility: CLINIC | Age: 62
End: 2022-07-12
Payer: COMMERCIAL

## 2022-07-12 VITALS — HEIGHT: 67 IN | WEIGHT: 169.56 LBS | BODY MASS INDEX: 26.61 KG/M2

## 2022-07-12 DIAGNOSIS — M20.61 TOE DEFORMITY, ACQUIRED, RIGHT: ICD-10-CM

## 2022-07-12 DIAGNOSIS — G89.18 POST-OP PAIN: ICD-10-CM

## 2022-07-12 DIAGNOSIS — Z01.818 PRE-OP EVALUATION: Primary | ICD-10-CM

## 2022-07-12 PROCEDURE — 1159F MED LIST DOCD IN RCRD: CPT | Mod: CPTII,S$GLB,, | Performed by: PHYSICIAN ASSISTANT

## 2022-07-12 PROCEDURE — 99499 NO LOS: ICD-10-PCS | Mod: S$GLB,,, | Performed by: PHYSICIAN ASSISTANT

## 2022-07-12 PROCEDURE — 99499 UNLISTED E&M SERVICE: CPT | Mod: S$GLB,,, | Performed by: PHYSICIAN ASSISTANT

## 2022-07-12 PROCEDURE — 1160F PR REVIEW ALL MEDS BY PRESCRIBER/CLIN PHARMACIST DOCUMENTED: ICD-10-PCS | Mod: CPTII,S$GLB,, | Performed by: PHYSICIAN ASSISTANT

## 2022-07-12 PROCEDURE — 3008F BODY MASS INDEX DOCD: CPT | Mod: CPTII,S$GLB,, | Performed by: PHYSICIAN ASSISTANT

## 2022-07-12 PROCEDURE — 99999 PR PBB SHADOW E&M-EST. PATIENT-LVL III: CPT | Mod: PBBFAC,,, | Performed by: PHYSICIAN ASSISTANT

## 2022-07-12 PROCEDURE — 1160F RVW MEDS BY RX/DR IN RCRD: CPT | Mod: CPTII,S$GLB,, | Performed by: PHYSICIAN ASSISTANT

## 2022-07-12 PROCEDURE — 4010F ACE/ARB THERAPY RXD/TAKEN: CPT | Mod: CPTII,S$GLB,, | Performed by: PHYSICIAN ASSISTANT

## 2022-07-12 PROCEDURE — 1159F PR MEDICATION LIST DOCUMENTED IN MEDICAL RECORD: ICD-10-PCS | Mod: CPTII,S$GLB,, | Performed by: PHYSICIAN ASSISTANT

## 2022-07-12 PROCEDURE — 3008F PR BODY MASS INDEX (BMI) DOCUMENTED: ICD-10-PCS | Mod: CPTII,S$GLB,, | Performed by: PHYSICIAN ASSISTANT

## 2022-07-12 PROCEDURE — 99999 PR PBB SHADOW E&M-EST. PATIENT-LVL III: ICD-10-PCS | Mod: PBBFAC,,, | Performed by: PHYSICIAN ASSISTANT

## 2022-07-12 PROCEDURE — 4010F PR ACE/ARB THEARPY RXD/TAKEN: ICD-10-PCS | Mod: CPTII,S$GLB,, | Performed by: PHYSICIAN ASSISTANT

## 2022-07-12 RX ORDER — ACETAMINOPHEN 500 MG
1000 TABLET ORAL EVERY 8 HOURS
Qty: 90 TABLET | Refills: 0 | Status: SHIPPED | OUTPATIENT
Start: 2022-07-12 | End: 2024-03-18

## 2022-07-12 RX ORDER — OXYCODONE HYDROCHLORIDE 5 MG/1
5 TABLET ORAL EVERY 4 HOURS PRN
Qty: 30 TABLET | Refills: 0 | Status: SHIPPED | OUTPATIENT
Start: 2022-07-12 | End: 2023-03-16

## 2022-07-12 RX ORDER — GABAPENTIN 300 MG/1
300 CAPSULE ORAL 3 TIMES DAILY PRN
Qty: 30 CAPSULE | Refills: 0 | Status: SHIPPED | OUTPATIENT
Start: 2022-07-12 | End: 2023-03-16

## 2022-07-12 RX ORDER — NAPROXEN 500 MG/1
500 TABLET ORAL 2 TIMES DAILY WITH MEALS
Qty: 60 TABLET | Refills: 0 | Status: SHIPPED | OUTPATIENT
Start: 2022-07-12 | End: 2022-08-13
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
## 2022-07-12 NOTE — PROGRESS NOTES
Kip is a 61 y.o. male here today for a pre-operative visit in preparation for a Right 5th toe amputation to be performed by Dr. Flanagan on 7-14-22.  he was last seen and treated in the clinic on 6-29-22.  he has since seen their primary care for optimization of the chronic health concerns.  There has been no significant change in their past medical or orthopedic status since the previous visit.  No fever, chills, malaise or unexplained weight change.     Allergies, medications, past medical history and past surgical history were reviewed with the patient.     Physical examination was performed.     Consents were signed.   Patient has crutches and will bring with them the day of surgery. They have been counseled on proper use of the assistive device.     Discussed COVID19 with the patient, they are aware of our current policies and procedures, were given the option of delaying surgery, and they elect to proceed. Covid testing to be done 48 hours prior to surgery.    Pre, wilberto, and post operative procedure and expectations were discussed.  Questions were answered. The patient has been educated and is ready to proceed with surgery.  Approximately 30 minutes was spent discussing surgical outcomes, plans, procedures, pre, wilberto, and post operative expectations and care. The risks, benefits and alternatives to surgery were discussed with the patient at great length.  These include bleeding, infection, vessel/nerve damage, pain, numbness, tingling, complex regional pain syndrome, hardware/surgical failure, need for further surgery, malunion, nonunion, DVT, PE, arthritis and death. He also understands that the risks of surgery may be greater for some patients due to health or lifestyle issues, such as a current condition or a history of heart disease, obesity, clotting disorders, recurrent infections, smoking, sedentary lifestyle, or noncompliance with medications, therapy, or followup. The degree of the increased risk is  hard to estimate with any degree of precision.  Patient states an understanding and wishes to proceed with surgery.   All questions were answered.  No guarantees were implied or stated.  Informed consent was obtained  The patient will contact us if the have any questions, concerns, and changes in their medical condition prior to surgery.

## 2022-07-13 ENCOUNTER — TELEPHONE (OUTPATIENT)
Dept: ORTHOPEDICS | Facility: CLINIC | Age: 62
End: 2022-07-13
Payer: COMMERCIAL

## 2022-07-13 NOTE — TELEPHONE ENCOUNTER
I spoke with pt confirmed surgery arrival time of 10:30am [facility],nothing to eat or drink after midnight. Pt,verbalized understanding.          ----- Message from Jj Lugo sent at 7/13/2022  2:02 PM CDT -----  Regarding: arrival time for surgery tomorrow      The Pt states that he was at work and couldn't get your calls today    Please contact the Pt with his arrival time for surg tomorrow    Ph # [phone]

## 2022-07-14 ENCOUNTER — HOSPITAL ENCOUNTER (OUTPATIENT)
Facility: HOSPITAL | Age: 62
Discharge: HOME | End: 2022-07-14
Attending: ORTHOPAEDIC SURGERY | Admitting: ORTHOPAEDIC SURGERY
Payer: COMMERCIAL

## 2022-07-14 ENCOUNTER — ANESTHESIA (OUTPATIENT)
Dept: SURGERY | Facility: HOSPITAL | Age: 62
End: 2022-07-14
Payer: COMMERCIAL

## 2022-07-14 VITALS
SYSTOLIC BLOOD PRESSURE: 135 MMHG | TEMPERATURE: 98 F | HEIGHT: 67 IN | OXYGEN SATURATION: 98 % | RESPIRATION RATE: 18 BRPM | HEART RATE: 63 BPM | DIASTOLIC BLOOD PRESSURE: 65 MMHG | BODY MASS INDEX: 26.53 KG/M2 | WEIGHT: 169 LBS

## 2022-07-14 DIAGNOSIS — M20.61 TOE DEFORMITY, ACQUIRED, RIGHT: ICD-10-CM

## 2022-07-14 DIAGNOSIS — M20.61 TOE DEFORMITY, ACQUIRED, RIGHT: Primary | ICD-10-CM

## 2022-07-14 PROCEDURE — 94761 N-INVAS EAR/PLS OXIMETRY MLT: CPT

## 2022-07-14 PROCEDURE — 76942 ECHO GUIDE FOR BIOPSY: CPT | Performed by: STUDENT IN AN ORGANIZED HEALTH CARE EDUCATION/TRAINING PROGRAM

## 2022-07-14 PROCEDURE — 64450 PR NERVE BLOCK INJ, ANES/STEROID, OTHER PERIPHERAL: ICD-10-PCS | Mod: 59,RT,, | Performed by: ANESTHESIOLOGY

## 2022-07-14 PROCEDURE — 99900035 HC TECH TIME PER 15 MIN (STAT)

## 2022-07-14 PROCEDURE — 36000706: Performed by: ORTHOPAEDIC SURGERY

## 2022-07-14 PROCEDURE — 88305 TISSUE EXAM BY PATHOLOGIST: CPT | Mod: 26,,, | Performed by: PATHOLOGY

## 2022-07-14 PROCEDURE — 88305 TISSUE EXAM BY PATHOLOGIST: ICD-10-PCS | Mod: 26,,, | Performed by: PATHOLOGY

## 2022-07-14 PROCEDURE — 28820 PR AMPUTATION TOE,MT-P JT: ICD-10-PCS | Mod: T9,,, | Performed by: ORTHOPAEDIC SURGERY

## 2022-07-14 PROCEDURE — 25000003 PHARM REV CODE 250: Performed by: ANESTHESIOLOGY

## 2022-07-14 PROCEDURE — 88305 TISSUE EXAM BY PATHOLOGIST: CPT | Performed by: PATHOLOGY

## 2022-07-14 PROCEDURE — 36000707: Performed by: ORTHOPAEDIC SURGERY

## 2022-07-14 PROCEDURE — D9220A PRA ANESTHESIA: ICD-10-PCS | Mod: CRNA,,, | Performed by: NURSE ANESTHETIST, CERTIFIED REGISTERED

## 2022-07-14 PROCEDURE — 37000008 HC ANESTHESIA 1ST 15 MINUTES: Performed by: ORTHOPAEDIC SURGERY

## 2022-07-14 PROCEDURE — 71000015 HC POSTOP RECOV 1ST HR: Performed by: ORTHOPAEDIC SURGERY

## 2022-07-14 PROCEDURE — 64450 NJX AA&/STRD OTHER PN/BRANCH: CPT | Mod: 59,RT,, | Performed by: ANESTHESIOLOGY

## 2022-07-14 PROCEDURE — 63600175 PHARM REV CODE 636 W HCPCS: Performed by: STUDENT IN AN ORGANIZED HEALTH CARE EDUCATION/TRAINING PROGRAM

## 2022-07-14 PROCEDURE — 76942 PR U/S GUIDANCE FOR NEEDLE GUIDANCE: ICD-10-PCS | Mod: 26,,, | Performed by: ANESTHESIOLOGY

## 2022-07-14 PROCEDURE — D9220A PRA ANESTHESIA: ICD-10-PCS | Mod: ANES,,, | Performed by: ANESTHESIOLOGY

## 2022-07-14 PROCEDURE — D9220A PRA ANESTHESIA: Mod: ANES,,, | Performed by: ANESTHESIOLOGY

## 2022-07-14 PROCEDURE — 25000003 PHARM REV CODE 250: Performed by: NURSE ANESTHETIST, CERTIFIED REGISTERED

## 2022-07-14 PROCEDURE — 64447 NJX AA&/STRD FEMORAL NRV IMG: CPT | Performed by: STUDENT IN AN ORGANIZED HEALTH CARE EDUCATION/TRAINING PROGRAM

## 2022-07-14 PROCEDURE — 63600175 PHARM REV CODE 636 W HCPCS: Performed by: ORTHOPAEDIC SURGERY

## 2022-07-14 PROCEDURE — 64447 NJX AA&/STRD FEMORAL NRV IMG: CPT | Mod: 59,RT,, | Performed by: ANESTHESIOLOGY

## 2022-07-14 PROCEDURE — 71000033 HC RECOVERY, INTIAL HOUR: Performed by: ORTHOPAEDIC SURGERY

## 2022-07-14 PROCEDURE — 28820 AMPUTATION OF TOE: CPT | Mod: T9,,, | Performed by: ORTHOPAEDIC SURGERY

## 2022-07-14 PROCEDURE — 63600175 PHARM REV CODE 636 W HCPCS: Performed by: NURSE ANESTHETIST, CERTIFIED REGISTERED

## 2022-07-14 PROCEDURE — 37000009 HC ANESTHESIA EA ADD 15 MINS: Performed by: ORTHOPAEDIC SURGERY

## 2022-07-14 PROCEDURE — 64447 PR NERVE BLOCK INJ, ANES/STEROID, FEMORAL, INCL IMAG GUIDANCE: ICD-10-PCS | Mod: 59,RT,, | Performed by: ANESTHESIOLOGY

## 2022-07-14 PROCEDURE — D9220A PRA ANESTHESIA: Mod: CRNA,,, | Performed by: NURSE ANESTHETIST, CERTIFIED REGISTERED

## 2022-07-14 PROCEDURE — 76942 ECHO GUIDE FOR BIOPSY: CPT | Mod: 26,,, | Performed by: ANESTHESIOLOGY

## 2022-07-14 PROCEDURE — 25000003 PHARM REV CODE 250: Performed by: STUDENT IN AN ORGANIZED HEALTH CARE EDUCATION/TRAINING PROGRAM

## 2022-07-14 RX ORDER — ONDANSETRON 2 MG/ML
INJECTION INTRAMUSCULAR; INTRAVENOUS
Status: DISCONTINUED | OUTPATIENT
Start: 2022-07-14 | End: 2022-07-14

## 2022-07-14 RX ORDER — SODIUM CHLORIDE 0.9 % (FLUSH) 0.9 %
10 SYRINGE (ML) INJECTION
Status: DISCONTINUED | OUTPATIENT
Start: 2022-07-14 | End: 2022-07-14 | Stop reason: HOSPADM

## 2022-07-14 RX ORDER — PROPOFOL 10 MG/ML
INJECTION, EMULSION INTRAVENOUS CONTINUOUS PRN
Status: DISCONTINUED | OUTPATIENT
Start: 2022-07-14 | End: 2022-07-14

## 2022-07-14 RX ORDER — LIDOCAINE HCL/PF 100 MG/5ML
SYRINGE (ML) INTRAVENOUS
Status: DISCONTINUED | OUTPATIENT
Start: 2022-07-14 | End: 2022-07-14

## 2022-07-14 RX ORDER — MUPIROCIN 20 MG/G
OINTMENT TOPICAL 2 TIMES DAILY
Status: DISCONTINUED | OUTPATIENT
Start: 2022-07-14 | End: 2022-07-14 | Stop reason: HOSPADM

## 2022-07-14 RX ORDER — DEXMEDETOMIDINE HYDROCHLORIDE 100 UG/ML
INJECTION, SOLUTION INTRAVENOUS
Status: DISCONTINUED | OUTPATIENT
Start: 2022-07-14 | End: 2022-07-14

## 2022-07-14 RX ORDER — BUPIVACAINE HYDROCHLORIDE 5 MG/ML
INJECTION, SOLUTION EPIDURAL; INTRACAUDAL
Status: COMPLETED | OUTPATIENT
Start: 2022-07-14 | End: 2022-07-14

## 2022-07-14 RX ORDER — CELECOXIB 200 MG/1
400 CAPSULE ORAL
Status: COMPLETED | OUTPATIENT
Start: 2022-07-14 | End: 2022-07-14

## 2022-07-14 RX ORDER — OXYCODONE HYDROCHLORIDE 5 MG/1
5 TABLET ORAL EVERY 4 HOURS PRN
Status: DISCONTINUED | OUTPATIENT
Start: 2022-07-14 | End: 2022-07-14 | Stop reason: HOSPADM

## 2022-07-14 RX ORDER — MIDAZOLAM HYDROCHLORIDE 1 MG/ML
INJECTION, SOLUTION INTRAMUSCULAR; INTRAVENOUS
Status: DISCONTINUED | OUTPATIENT
Start: 2022-07-14 | End: 2022-07-14

## 2022-07-14 RX ORDER — FENTANYL CITRATE 50 UG/ML
25-200 INJECTION, SOLUTION INTRAMUSCULAR; INTRAVENOUS
Status: DISCONTINUED | OUTPATIENT
Start: 2022-07-14 | End: 2022-07-14 | Stop reason: HOSPADM

## 2022-07-14 RX ORDER — MIDAZOLAM HYDROCHLORIDE 1 MG/ML
.5-4 INJECTION INTRAMUSCULAR; INTRAVENOUS
Status: DISCONTINUED | OUTPATIENT
Start: 2022-07-14 | End: 2022-07-14 | Stop reason: HOSPADM

## 2022-07-14 RX ORDER — OXYCODONE HYDROCHLORIDE 5 MG/1
10 TABLET ORAL EVERY 4 HOURS PRN
Status: DISCONTINUED | OUTPATIENT
Start: 2022-07-14 | End: 2022-07-14 | Stop reason: HOSPADM

## 2022-07-14 RX ORDER — ACETAMINOPHEN 325 MG/1
650 TABLET ORAL EVERY 4 HOURS PRN
Status: DISCONTINUED | OUTPATIENT
Start: 2022-07-14 | End: 2022-07-14 | Stop reason: HOSPADM

## 2022-07-14 RX ORDER — SODIUM CHLORIDE 9 MG/ML
INJECTION, SOLUTION INTRAVENOUS CONTINUOUS PRN
Status: DISCONTINUED | OUTPATIENT
Start: 2022-07-14 | End: 2022-07-14

## 2022-07-14 RX ORDER — FAMOTIDINE 10 MG/ML
INJECTION INTRAVENOUS
Status: DISCONTINUED | OUTPATIENT
Start: 2022-07-14 | End: 2022-07-14

## 2022-07-14 RX ORDER — METOCLOPRAMIDE 10 MG/1
10 TABLET ORAL EVERY 6 HOURS PRN
Status: DISCONTINUED | OUTPATIENT
Start: 2022-07-14 | End: 2022-07-14 | Stop reason: HOSPADM

## 2022-07-14 RX ORDER — ONDANSETRON 4 MG/1
8 TABLET, ORALLY DISINTEGRATING ORAL EVERY 8 HOURS PRN
Status: DISCONTINUED | OUTPATIENT
Start: 2022-07-14 | End: 2022-07-14 | Stop reason: HOSPADM

## 2022-07-14 RX ORDER — CEFAZOLIN SODIUM 1 G/3ML
2 INJECTION, POWDER, FOR SOLUTION INTRAMUSCULAR; INTRAVENOUS
Status: COMPLETED | OUTPATIENT
Start: 2022-07-14 | End: 2022-07-14

## 2022-07-14 RX ORDER — PROPOFOL 10 MG/ML
VIAL (ML) INTRAVENOUS CONTINUOUS PRN
Status: DISCONTINUED | OUTPATIENT
Start: 2022-07-14 | End: 2022-07-14

## 2022-07-14 RX ORDER — ACETAMINOPHEN 500 MG
1000 TABLET ORAL ONCE
Status: COMPLETED | OUTPATIENT
Start: 2022-07-14 | End: 2022-07-14

## 2022-07-14 RX ORDER — SODIUM CHLORIDE 0.9 % (FLUSH) 0.9 %
3 SYRINGE (ML) INJECTION
Status: DISCONTINUED | OUTPATIENT
Start: 2022-07-14 | End: 2022-07-14 | Stop reason: HOSPADM

## 2022-07-14 RX ORDER — ONDANSETRON 2 MG/ML
4 INJECTION INTRAMUSCULAR; INTRAVENOUS ONCE AS NEEDED
Status: DISCONTINUED | OUTPATIENT
Start: 2022-07-14 | End: 2022-07-14 | Stop reason: HOSPADM

## 2022-07-14 RX ORDER — DEXAMETHASONE SODIUM PHOSPHATE 4 MG/ML
INJECTION, SOLUTION INTRA-ARTICULAR; INTRALESIONAL; INTRAMUSCULAR; INTRAVENOUS; SOFT TISSUE
Status: DISCONTINUED | OUTPATIENT
Start: 2022-07-14 | End: 2022-07-14

## 2022-07-14 RX ADMIN — BUPIVACAINE HYDROCHLORIDE 30 ML: 5 INJECTION, SOLUTION EPIDURAL; INTRACAUDAL; PERINEURAL at 11:07

## 2022-07-14 RX ADMIN — ACETAMINOPHEN 1000 MG: 500 TABLET ORAL at 11:07

## 2022-07-14 RX ADMIN — FAMOTIDINE 20 MG: 10 INJECTION, SOLUTION INTRAVENOUS at 12:07

## 2022-07-14 RX ADMIN — ONDANSETRON 4 MG: 2 INJECTION INTRAMUSCULAR; INTRAVENOUS at 12:07

## 2022-07-14 RX ADMIN — MIDAZOLAM 2 MG: 1 INJECTION INTRAMUSCULAR; INTRAVENOUS at 11:07

## 2022-07-14 RX ADMIN — LIDOCAINE HYDROCHLORIDE 50 MG: 20 INJECTION, SOLUTION INTRAVENOUS at 12:07

## 2022-07-14 RX ADMIN — DEXMEDETOMIDINE HYDROCHLORIDE 4 MCG: 100 INJECTION, SOLUTION, CONCENTRATE INTRAVENOUS at 12:07

## 2022-07-14 RX ADMIN — GLYCOPYRROLATE 0.2 MG: 0.2 INJECTION, SOLUTION INTRAMUSCULAR; INTRAVITREAL at 12:07

## 2022-07-14 RX ADMIN — CEFAZOLIN 2 G: 330 INJECTION, POWDER, FOR SOLUTION INTRAMUSCULAR; INTRAVENOUS at 12:07

## 2022-07-14 RX ADMIN — BUPIVACAINE HYDROCHLORIDE 20 ML: 5 INJECTION, SOLUTION EPIDURAL; INTRACAUDAL at 11:07

## 2022-07-14 RX ADMIN — MIDAZOLAM HYDROCHLORIDE 2 MG: 1 INJECTION, SOLUTION INTRAMUSCULAR; INTRAVENOUS at 12:07

## 2022-07-14 RX ADMIN — DEXAMETHASONE SODIUM PHOSPHATE 8 MG: 4 INJECTION, SOLUTION INTRAMUSCULAR; INTRAVENOUS at 12:07

## 2022-07-14 RX ADMIN — SODIUM CHLORIDE: 0.9 INJECTION, SOLUTION INTRAVENOUS at 11:07

## 2022-07-14 RX ADMIN — LIDOCAINE HYDROCHLORIDE 50 MG: 20 INJECTION, SOLUTION INTRAVENOUS at 11:07

## 2022-07-14 RX ADMIN — CELECOXIB 400 MG: 200 CAPSULE ORAL at 11:07

## 2022-07-14 RX ADMIN — PROPOFOL 175 MCG/KG/MIN: 10 INJECTION, EMULSION INTRAVENOUS at 11:07

## 2022-07-14 RX ADMIN — FENTANYL CITRATE 100 MCG: 50 INJECTION, SOLUTION INTRAMUSCULAR; INTRAVENOUS at 11:07

## 2022-07-14 NOTE — OP NOTE
Operative report  Date of surgery:  07/14/2022    Preop diagnosis:  Recurrent painful deformity right foot 5th toe    Postop diagnosis:  Same    Procedure:  Right 5th toe amputation, MTP disarticulation    Anesthesia:  Regional    Surgeon:  Dr. Flanagan  Assistant:      Complications:  None  Estimated blood loss:  None  Specimens:  The right 5th toe was sent to pathology.    Procedure in detail:  After regional anesthesia was administered in the preop holding area, the patient was brought to the operating room and placed on the operating table in a supine position.  A sterile tourniquet was placed around the right calf.  The right leg was exsanguinated with an Esmarch bandage and the tourniquet was elevated to 250 mmHg.  A tennis racquet type incision was made beginning with the longitudinal limb on the lateral aspect of the 1st metatarsal head with a circumferential incision around the base of the proximal phalanx.  The incision was carried sharply down to bone around the proximal phalanx.  The toe was then removed at the PIP joint.  The proximal phalanx was skeletonized with sharp dissection back to the MTP joint where was transected.  Once the toe was removed the wound was well irrigated.  The skin incision was closed with interrupted 3-0 nylon sutures.  A sterile compressive dressing was placed and the patient was returned to the postop holding area in stable condition.

## 2022-07-14 NOTE — TRANSFER OF CARE
"Anesthesia Transfer of Care Note    Patient: Kip Lauren    Procedure(s) Performed: Procedure(s) (LRB):  AMPUTATION, TOE Fifth toe (Right)    Patient location: PACU    Anesthesia Type: general    Transport from OR: Transported from OR on 6-10 L/min O2 by face mask with adequate spontaneous ventilation    Post pain: adequate analgesia    Post assessment: no apparent anesthetic complications    Post vital signs: stable    Level of consciousness: awake    Nausea/Vomiting: no nausea/vomiting    Complications: none    Transfer of care protocol was followed      Last vitals:   Visit Vitals  /73 (BP Location: Left arm, Patient Position: Lying)   Pulse 77   Temp 36.9 °C (98.4 °F) (Oral)   Resp 12   Ht 5' 7" (1.702 m)   Wt 76.7 kg (169 lb)   SpO2 97%   BMI 26.47 kg/m²     "

## 2022-07-14 NOTE — BRIEF OP NOTE
[facility] - Surgery ([facility])  Brief Operative Note    SUMMARY     Surgery Date: 7/14/2022     Surgeon(s) and Role:     * Washington Patel MD - Primary    Assisting Surgeon: None    Pre-op Diagnosis:  Toe deformity, acquired, right [M20.61]    Post-op Diagnosis:  Post-Op Diagnosis Codes:     * Toe deformity, acquired, right [M20.61]    Procedure(s) (LRB):  AMPUTATION, TOE Fifth toe (Right)    Anesthesia: Choice    Operative Findings: See op note    Estimated Blood Loss: * No values recorded between 7/14/2022 12:24 PM and 7/14/2022 12:54 PM *    Estimated Blood Loss has been documented.         Specimens:   Specimen (24h ago, onward)             Start     Ordered    07/14/22 1231  Specimen to Pathology, Surgery Orthopedics  Once        Comments: Pre-op Diagnosis: Toe deformity, acquired, right [M20.61]Procedure(s):AMPUTATION, TOE Fifth toe Number of specimens: 1Name of specimens: 1) RIGHT 5TH TOE AMPUTATION     References:    Click here for ordering Quick Tip   Question Answer Comment   Procedure Type: Orthopedics    Which provider would you like to cc? WASHINGTON PATEL.    Release to patient Immediate        07/14/22 1233                [id number]

## 2022-07-14 NOTE — PLAN OF CARE
Pre op complete. Questions answered. Resting comfortably. Call light in reach. Personal belongings placed in locker. Pt has crutches in car.

## 2022-07-14 NOTE — ANESTHESIA PROCEDURE NOTES
Right Adductor single shot    Patient location during procedure: pre-op   Block not for primary anesthetic.  Reason for block: at surgeon's request and post-op pain management   Post-op Pain Location: Right foot and toe pain   Start time: 7/14/2022 11:20 AM  Timeout: 7/14/2022 11:20 AM   End time: 7/14/2022 11:26 AM    Staffing  Authorizing Provider: Dante Barker MD  Performing Provider: Blanche Cruz MD    Preanesthetic Checklist  Completed: patient identified, IV checked, site marked, risks and benefits discussed, surgical consent, monitors and equipment checked, pre-op evaluation and timeout performed  Peripheral Block  Patient position: supine  Prep: ChloraPrep  Patient monitoring: heart rate, cardiac monitor, continuous pulse ox, continuous capnometry and frequent blood pressure checks  Block type: adductor canal  Laterality: right  Injection technique: single shot  Needle  Needle type: Stimuplex   Needle gauge: 20 G  Needle length: 4 in  Needle localization: anatomical landmarks and ultrasound guidance   -ultrasound image captured on disc.  Assessment  Injection assessment: negative aspiration, negative parasthesia and local visualized surrounding nerve  Paresthesia pain: none  Heart rate change: no  Slow fractionated injection: yes  Pain Tolerance: comfortable throughout block and no complaints  Medications:    Medications: bupivacaine (pf) (MARCAINE) injection 0.5% - Perineural   20 mL - 7/14/2022 11:26:00 AM    Additional Notes  Right adductor single shot block performed under ultrasound guidance.  Administered 20cc of 0.5% bupivacaine with epi 1:300k and additives.  Negative aspiration and visualization of local spread confirmed with ultrasound.   VSS. Patient tolerated well. No immediate complications.  [facility] RN present and monitored throughout procedure. See RN flowsheet.

## 2022-07-14 NOTE — ANESTHESIA PROCEDURE NOTES
Right Popliteal single shot    Patient location during procedure: pre-op   Block not for primary anesthetic.  Reason for block: at surgeon's request and post-op pain management   Post-op Pain Location: Right foot and toe pain   Start time: 7/14/2022 11:20 AM  Timeout: 7/14/2022 11:20 AM   End time: 7/14/2022 11:26 AM    Staffing  Authorizing Provider: Dante Barker MD  Performing Provider: Blanche Cruz MD    Preanesthetic Checklist  Completed: patient identified, IV checked, site marked, risks and benefits discussed, surgical consent, monitors and equipment checked, pre-op evaluation and timeout performed  Peripheral Block  Patient position: supine  Prep: ChloraPrep  Patient monitoring: heart rate, cardiac monitor, continuous pulse ox, continuous capnometry and frequent blood pressure checks  Block type: popliteal  Laterality: right  Injection technique: single shot  Needle  Needle type: Stimuplex   Needle gauge: 20 G  Needle length: 4 in  Needle localization: anatomical landmarks and ultrasound guidance   -ultrasound image captured on disc.  Assessment  Injection assessment: negative aspiration, negative parasthesia and local visualized surrounding nerve  Paresthesia pain: none  Heart rate change: no  Slow fractionated injection: yes  Pain Tolerance: comfortable throughout block and no complaints  Medications:    Medications: bupivacaine (pf) (MARCAINE) injection 0.5% - Perineural   30 mL - 7/14/2022 11:23:00 AM    Additional Notes  Right popliteal single shot block performed under ultrasound guidance.  Administered 30cc of 0.5% bupivacaine with epi 1:300k.  Negative aspiration and visualization of local spread confirmed with ultrasound.   VSS. Patient tolerated well. No immediate complications.  [facility] RN present and monitored throughout procedure. See RN flowsheet.

## 2022-07-15 NOTE — ANESTHESIA POSTPROCEDURE EVALUATION
Anesthesia Post Evaluation    Patient: Kip Lauren    Procedure(s) Performed: Procedure(s) (LRB):  AMPUTATION, TOE Fifth toe (Right)    Final Anesthesia Type: general      Patient location during evaluation: PACU  Patient participation: Yes- Able to Participate  Level of consciousness: awake and alert and oriented  Post-procedure vital signs: reviewed and stable  Pain management: adequate  Airway patency: patent    PONV status at discharge: No PONV  Anesthetic complications: no      Cardiovascular status: blood pressure returned to baseline and hemodynamically stable  Respiratory status: unassisted, room air and spontaneous ventilation  Hydration status: euvolemic  Follow-up not needed.          Vitals Value Taken Time   /65 07/14/22 1330   Temp 36.6 °C (97.8 °F) 07/14/22 1330   Pulse 62 07/14/22 1344   Resp 33 07/14/22 1344   SpO2 81 % 07/14/22 1333   Vitals shown include unvalidated device data.      Event Time   Out of Recovery 13:53:37         Pain/Florina Score: Pain Rating Prior to Med Admin: 2 (7/14/2022 11:10 AM)  Florina Score: 9 (7/14/2022  1:00 PM)

## 2022-07-18 NOTE — DISCHARGE SUMMARY
[place] - Surgery (Hospital)  Discharge Note  Short Stay    Procedure(s) (LRB):  AMPUTATION, TOE Fifth toe (Right)    OUTCOME: Patient tolerated treatment/procedure well without complication and is now ready for discharge.    DISPOSITION: Home or Self Care    FINAL DIAGNOSIS:  Toe deformity, acquired, right    FOLLOWUP: In clinic    DISCHARGE INSTRUCTIONS:    Discharge Procedure Orders   Diet general     Sponge bath only until clinic visit     Keep surgical extremity elevated     Ice to affected area     No driving, operating heavy equipment or signing legal documents while taking pain medication     Leave dressing on - Keep it clean, dry, and intact until clinic visit     Call MD for:  temperature >100.4     Call MD for:  persistent nausea and vomiting     Call MD for:  severe uncontrolled pain     Call MD for:  difficulty breathing, headache or visual disturbances     Call MD for:  redness, tenderness, or signs of infection (pain, swelling, redness, odor or green/yellow discharge around incision site)     Call MD for:  hives     Call MD for:  persistent dizziness or light-headedness     Call MD for:  extreme fatigue     Shower on day dressing removed (No bath)     Weight bearing restrictions (specify)   Order Comments: WBAT        TIME SPENT ON DISCHARGE: 20 minutes

## 2022-07-21 ENCOUNTER — PATIENT MESSAGE (OUTPATIENT)
Dept: ORTHOPEDICS | Facility: CLINIC | Age: 62
End: 2022-07-21
Payer: COMMERCIAL

## 2022-07-21 ENCOUNTER — TELEPHONE (OUTPATIENT)
Dept: ORTHOPEDICS | Facility: CLINIC | Age: 62
End: 2022-07-21
Payer: COMMERCIAL

## 2022-07-21 NOTE — TELEPHONE ENCOUNTER
Returned patient phone call. LM to call [phone]    ----- Message from Jemma Washington sent at 7/21/2022  4:43 PM CDT -----  Regarding: reschedule appt  Contact: pt  Pt returning call in regards to rescheduling appt     Confirmed patient's contact info below:  Contact Name: Kip Lauren  Phone Number: [phone]

## 2022-07-21 NOTE — TELEPHONE ENCOUNTER
CK on home phone letting patient know that post op appt on 7/26 will need to be rescheduled due to Amanda Castillo not being in clinic next week. Asked patient to call [phone] and someone will assist in getting him rescheduled with a different provider.

## 2022-07-22 ENCOUNTER — TELEPHONE (OUTPATIENT)
Dept: ORTHOPEDICS | Facility: CLINIC | Age: 62
End: 2022-07-22
Payer: COMMERCIAL

## 2022-07-22 NOTE — TELEPHONE ENCOUNTER
I spoke with pt,confirmed appoinment time of 9:15 Tuesday with . pt,verbalized understanding.        ----- Message from Rama Kaur sent at 7/22/2022 10:38 AM CDT -----  Regarding: Return call    Type:  Patient Returning Call    Who Called: BRYAN GILL [[id number]]    Who Left Message for Patient:  YADIRA RAMIREZ    Does the patient know what this is regarding?: Y    Can the clinic reply in [facility]: No    Best Call Back Number: [phone]    Additional Information: pt confirmed appt change

## 2022-07-25 NOTE — TELEPHONE ENCOUNTER
[employer] Ins apparently denying the combo product to reach the 10/40 dosing.    D/c aml/benaz    Begin norvasc 10 and benazepril 40, 1 each per day.    Discussed changes w/ pat and wife.

## 2022-07-26 ENCOUNTER — OFFICE VISIT (OUTPATIENT)
Dept: ORTHOPEDICS | Facility: CLINIC | Age: 62
End: 2022-07-26
Payer: COMMERCIAL

## 2022-07-26 VITALS — BODY MASS INDEX: 26.53 KG/M2 | WEIGHT: 169.06 LBS | HEIGHT: 67 IN

## 2022-07-26 DIAGNOSIS — Z09 FOLLOW-UP EXAMINATION AFTER ORTHOPEDIC SURGERY: Primary | ICD-10-CM

## 2022-07-26 DIAGNOSIS — T81.49XA WOUND INFECTION AFTER SURGERY: ICD-10-CM

## 2022-07-26 PROCEDURE — 1159F PR MEDICATION LIST DOCUMENTED IN MEDICAL RECORD: ICD-10-PCS | Mod: CPTII,S$GLB,, | Performed by: ORTHOPAEDIC SURGERY

## 2022-07-26 PROCEDURE — 3008F BODY MASS INDEX DOCD: CPT | Mod: CPTII,S$GLB,, | Performed by: ORTHOPAEDIC SURGERY

## 2022-07-26 PROCEDURE — 99999 PR PBB SHADOW E&M-EST. PATIENT-LVL III: ICD-10-PCS | Mod: PBBFAC,,, | Performed by: ORTHOPAEDIC SURGERY

## 2022-07-26 PROCEDURE — 99024 PR POST-OP FOLLOW-UP VISIT: ICD-10-PCS | Mod: S$GLB,,, | Performed by: ORTHOPAEDIC SURGERY

## 2022-07-26 PROCEDURE — 99999 PR PBB SHADOW E&M-EST. PATIENT-LVL III: CPT | Mod: PBBFAC,,, | Performed by: ORTHOPAEDIC SURGERY

## 2022-07-26 PROCEDURE — 3008F PR BODY MASS INDEX (BMI) DOCUMENTED: ICD-10-PCS | Mod: CPTII,S$GLB,, | Performed by: ORTHOPAEDIC SURGERY

## 2022-07-26 PROCEDURE — 1160F RVW MEDS BY RX/DR IN RCRD: CPT | Mod: CPTII,S$GLB,, | Performed by: ORTHOPAEDIC SURGERY

## 2022-07-26 PROCEDURE — 99024 POSTOP FOLLOW-UP VISIT: CPT | Mod: S$GLB,,, | Performed by: ORTHOPAEDIC SURGERY

## 2022-07-26 PROCEDURE — 4010F PR ACE/ARB THEARPY RXD/TAKEN: ICD-10-PCS | Mod: CPTII,S$GLB,, | Performed by: ORTHOPAEDIC SURGERY

## 2022-07-26 PROCEDURE — 1159F MED LIST DOCD IN RCRD: CPT | Mod: CPTII,S$GLB,, | Performed by: ORTHOPAEDIC SURGERY

## 2022-07-26 PROCEDURE — 1160F PR REVIEW ALL MEDS BY PRESCRIBER/CLIN PHARMACIST DOCUMENTED: ICD-10-PCS | Mod: CPTII,S$GLB,, | Performed by: ORTHOPAEDIC SURGERY

## 2022-07-26 PROCEDURE — 4010F ACE/ARB THERAPY RXD/TAKEN: CPT | Mod: CPTII,S$GLB,, | Performed by: ORTHOPAEDIC SURGERY

## 2022-07-26 RX ORDER — SULFAMETHOXAZOLE AND TRIMETHOPRIM 800; 160 MG/1; MG/1
1 TABLET ORAL 2 TIMES DAILY
Qty: 20 TABLET | Refills: 0 | Status: SHIPPED | OUTPATIENT
Start: 2022-07-26 | End: 2023-03-16

## 2022-07-26 NOTE — PROGRESS NOTES
Kip Lauren  Returns today for follow-up.  He is now about 12 days postop from his right 5th toe amputation.  He reports overall his pain is controlled but still significant when he is on his feet.  He tried to return to work yesterday because only spent about 3 hours on his foot.  He reports some drainage from his wound.    Examination:  The incision on the right foot is not completely healed.  There is some mild drainage from the distal aspect.  There is no significant surrounding erythema.    Impression:  1. Follow-up examination after orthopedic surgery  sulfamethoxazole-trimethoprim 800-160mg (BACTRIM DS) 800-160 mg Tab   2. Wound infection after surgery  sulfamethoxazole-trimethoprim 800-160mg (BACTRIM DS) 800-160 mg Tab     Recommendation:  I am going to leave the stitches in for few more days.  I am also going to start him on an antibiotic.    Follow-up in 3 days for re-evaluation

## 2022-07-29 ENCOUNTER — OFFICE VISIT (OUTPATIENT)
Dept: ORTHOPEDICS | Facility: CLINIC | Age: 62
End: 2022-07-29
Payer: COMMERCIAL

## 2022-07-29 VITALS — WEIGHT: 169.06 LBS | BODY MASS INDEX: 26.53 KG/M2 | HEIGHT: 67 IN

## 2022-07-29 DIAGNOSIS — T81.49XA WOUND INFECTION AFTER SURGERY: ICD-10-CM

## 2022-07-29 DIAGNOSIS — Z09 FOLLOW-UP EXAMINATION AFTER ORTHOPEDIC SURGERY: Primary | ICD-10-CM

## 2022-07-29 LAB
FINAL PATHOLOGIC DIAGNOSIS: NORMAL
GROSS: NORMAL
Lab: NORMAL

## 2022-07-29 PROCEDURE — 4010F PR ACE/ARB THEARPY RXD/TAKEN: ICD-10-PCS | Mod: CPTII,S$GLB,, | Performed by: ORTHOPAEDIC SURGERY

## 2022-07-29 PROCEDURE — 4010F ACE/ARB THERAPY RXD/TAKEN: CPT | Mod: CPTII,S$GLB,, | Performed by: ORTHOPAEDIC SURGERY

## 2022-07-29 PROCEDURE — 3008F BODY MASS INDEX DOCD: CPT | Mod: CPTII,S$GLB,, | Performed by: ORTHOPAEDIC SURGERY

## 2022-07-29 PROCEDURE — 1159F PR MEDICATION LIST DOCUMENTED IN MEDICAL RECORD: ICD-10-PCS | Mod: CPTII,S$GLB,, | Performed by: ORTHOPAEDIC SURGERY

## 2022-07-29 PROCEDURE — 99999 PR PBB SHADOW E&M-EST. PATIENT-LVL III: CPT | Mod: PBBFAC,,, | Performed by: ORTHOPAEDIC SURGERY

## 2022-07-29 PROCEDURE — 99999 PR PBB SHADOW E&M-EST. PATIENT-LVL III: ICD-10-PCS | Mod: PBBFAC,,, | Performed by: ORTHOPAEDIC SURGERY

## 2022-07-29 PROCEDURE — 99024 PR POST-OP FOLLOW-UP VISIT: ICD-10-PCS | Mod: S$GLB,,, | Performed by: ORTHOPAEDIC SURGERY

## 2022-07-29 PROCEDURE — 1159F MED LIST DOCD IN RCRD: CPT | Mod: CPTII,S$GLB,, | Performed by: ORTHOPAEDIC SURGERY

## 2022-07-29 PROCEDURE — 99024 POSTOP FOLLOW-UP VISIT: CPT | Mod: S$GLB,,, | Performed by: ORTHOPAEDIC SURGERY

## 2022-07-29 PROCEDURE — 3008F PR BODY MASS INDEX (BMI) DOCUMENTED: ICD-10-PCS | Mod: CPTII,S$GLB,, | Performed by: ORTHOPAEDIC SURGERY

## 2022-07-29 NOTE — PROGRESS NOTES
Kip Lauren  Returns today for follow-up.  He is now about 15 days postop from his right 5th toe amputation.  I saw him 3 days ago and his wound was not yet completel healed and I felt he might have a mild wound infection so I put him on antibiotics and a left his sutures in.  He returns today for follow-up.  He reports overall he is improved.  He has been taking his antibiotics    Examination:  The incision is much improved.  There is no erythema and no drainage today.  The distal part this is incision is not completely healed but I removed the sutures from the proximal aspect.  I am leave the sutures in for another week because he is very sensitive.     Impression:  1. Follow-up examination after orthopedic surgery     2. Wound infection after surgery           Recommendation:  He is going to complete his course of antibiotics.  Follow-up in 1 week to remove the rest of his sutures

## 2022-08-05 ENCOUNTER — OFFICE VISIT (OUTPATIENT)
Dept: ORTHOPEDICS | Facility: CLINIC | Age: 62
End: 2022-08-05
Payer: COMMERCIAL

## 2022-08-05 DIAGNOSIS — M20.61 TOE DEFORMITY, ACQUIRED, RIGHT: ICD-10-CM

## 2022-08-05 DIAGNOSIS — Z09 FOLLOW-UP EXAMINATION AFTER ORTHOPEDIC SURGERY: Primary | ICD-10-CM

## 2022-08-05 PROCEDURE — 99024 POSTOP FOLLOW-UP VISIT: CPT | Mod: S$GLB,,, | Performed by: ORTHOPAEDIC SURGERY

## 2022-08-05 PROCEDURE — 4010F PR ACE/ARB THEARPY RXD/TAKEN: ICD-10-PCS | Mod: CPTII,S$GLB,, | Performed by: ORTHOPAEDIC SURGERY

## 2022-08-05 PROCEDURE — 1160F PR REVIEW ALL MEDS BY PRESCRIBER/CLIN PHARMACIST DOCUMENTED: ICD-10-PCS | Mod: CPTII,S$GLB,, | Performed by: ORTHOPAEDIC SURGERY

## 2022-08-05 PROCEDURE — 99024 PR POST-OP FOLLOW-UP VISIT: ICD-10-PCS | Mod: S$GLB,,, | Performed by: ORTHOPAEDIC SURGERY

## 2022-08-05 PROCEDURE — 99999 PR PBB SHADOW E&M-EST. PATIENT-LVL III: ICD-10-PCS | Mod: PBBFAC,,, | Performed by: ORTHOPAEDIC SURGERY

## 2022-08-05 PROCEDURE — 1159F MED LIST DOCD IN RCRD: CPT | Mod: CPTII,S$GLB,, | Performed by: ORTHOPAEDIC SURGERY

## 2022-08-05 PROCEDURE — 1160F RVW MEDS BY RX/DR IN RCRD: CPT | Mod: CPTII,S$GLB,, | Performed by: ORTHOPAEDIC SURGERY

## 2022-08-05 PROCEDURE — 1159F PR MEDICATION LIST DOCUMENTED IN MEDICAL RECORD: ICD-10-PCS | Mod: CPTII,S$GLB,, | Performed by: ORTHOPAEDIC SURGERY

## 2022-08-05 PROCEDURE — 4010F ACE/ARB THERAPY RXD/TAKEN: CPT | Mod: CPTII,S$GLB,, | Performed by: ORTHOPAEDIC SURGERY

## 2022-08-05 PROCEDURE — 99999 PR PBB SHADOW E&M-EST. PATIENT-LVL III: CPT | Mod: PBBFAC,,, | Performed by: ORTHOPAEDIC SURGERY

## 2022-08-05 NOTE — PROGRESS NOTES
Kip Courtney Guillermoprettyrachelleelliott  Returns today for follow-up.  He is about 3 weeks postop from his right 5th toe amputation.  He had some delayed healing of the distal aspect of the wound so I left the stitches in for another week.  He returns today and reports he is doing well.    Examination:  The incision is now well healed.  I removed the remaining 2 sutures.    Impression:  3 weeks postop right 5th toe amputation    Recommendation:  He can progress shoe wear and activity as pain and swelling allows    Follow-up as needed

## 2022-08-10 ENCOUNTER — HOSPITAL ENCOUNTER (OUTPATIENT)
Dept: CARDIOLOGY | Facility: HOSPITAL | Age: 62
Discharge: HOME | End: 2022-08-10
Attending: INTERNAL MEDICINE
Payer: COMMERCIAL

## 2022-08-10 VITALS
WEIGHT: 169 LBS | HEIGHT: 67 IN | BODY MASS INDEX: 26.53 KG/M2 | DIASTOLIC BLOOD PRESSURE: 84 MMHG | SYSTOLIC BLOOD PRESSURE: 154 MMHG | HEART RATE: 71 BPM

## 2022-08-10 DIAGNOSIS — I77.89 ASCENDING AORTA ENLARGEMENT: ICD-10-CM

## 2022-08-10 LAB
ASCENDING AORTA: 4.1 CM
AV INDEX (PROSTH): 1
AV MEAN GRADIENT: 4 MMHG
AV PEAK GRADIENT: 8 MMHG
AV VALVE AREA: 4.67 CM2
AV VELOCITY RATIO: 0.93
BSA FOR ECHO PROCEDURE: 1.9 M2
CV ECHO LV RWT: 0.41 CM
DOP CALC AO PEAK VEL: 1.38 M/S
DOP CALC AO VTI: 27.35 CM
DOP CALC LVOT AREA: 4.7 CM2
DOP CALC LVOT DIAMETER: 2.44 CM
DOP CALC LVOT PEAK VEL: 1.29 M/S
DOP CALC LVOT STROKE VOLUME: 127.73 CM3
DOP CALCLVOT PEAK VEL VTI: 27.33 CM
E WAVE DECELERATION TIME: 236.96 MSEC
E/A RATIO: 1.17
E/E' RATIO: 10.78 M/S
ECHO LV POSTERIOR WALL: 0.96 CM (ref 0.6–1.1)
EJECTION FRACTION: 65 %
FRACTIONAL SHORTENING: 36 % (ref 28–44)
INTERVENTRICULAR SEPTUM: 1 CM (ref 0.6–1.1)
IVRT: 99.9 MSEC
LA MAJOR: 6.01 CM
LA MINOR: 5.87 CM
LA WIDTH: 3.75 CM
LEFT ATRIUM SIZE: 3.94 CM
LEFT ATRIUM VOLUME INDEX: 39.7 ML/M2
LEFT ATRIUM VOLUME: 74.59 CM3
LEFT INTERNAL DIMENSION IN SYSTOLE: 2.97 CM (ref 2.1–4)
LEFT VENTRICLE DIASTOLIC VOLUME INDEX: 52.59 ML/M2
LEFT VENTRICLE DIASTOLIC VOLUME: 98.87 ML
LEFT VENTRICLE MASS INDEX: 83 G/M2
LEFT VENTRICLE SYSTOLIC VOLUME INDEX: 18.1 ML/M2
LEFT VENTRICLE SYSTOLIC VOLUME: 34.01 ML
LEFT VENTRICULAR INTERNAL DIMENSION IN DIASTOLE: 4.63 CM (ref 3.5–6)
LEFT VENTRICULAR MASS: 156.13 G
LV LATERAL E/E' RATIO: 8.82 M/S
LV SEPTAL E/E' RATIO: 13.86 M/S
MV A" WAVE DURATION": 10.28 MSEC
MV PEAK A VEL: 0.83 M/S
MV PEAK E VEL: 0.97 M/S
MV STENOSIS PRESSURE HALF TIME: 68.72 MS
MV VALVE AREA P 1/2 METHOD: 3.2 CM2
PISA TR MAX VEL: 2.59 M/S
PULM VEIN S/D RATIO: 1.66
PV PEAK D VEL: 0.44 M/S
PV PEAK S VEL: 0.73 M/S
RA MAJOR: 5.51 CM
RA PRESSURE: 3 MMHG
RA WIDTH: 3.26 CM
RIGHT VENTRICULAR END-DIASTOLIC DIMENSION: 3.87 CM
RV TISSUE DOPPLER FREE WALL SYSTOLIC VELOCITY 1 (APICAL 4 CHAMBER VIEW): 15.87 CM/S
SINUS: 4.3 CM
STJ: 3.7 CM
TDI LATERAL: 0.11 M/S
TDI SEPTAL: 0.07 M/S
TDI: 0.09 M/S
TR MAX PG: 27 MMHG
TRICUSPID ANNULAR PLANE SYSTOLIC EXCURSION: 2.83 CM
TV REST PULMONARY ARTERY PRESSURE: 30 MMHG

## 2022-08-10 PROCEDURE — 93306 TTE W/DOPPLER COMPLETE: CPT | Mod: 26,,, | Performed by: INTERNAL MEDICINE

## 2022-08-10 PROCEDURE — 93306 ECHO (CUPID ONLY): ICD-10-PCS | Mod: 26,,, | Performed by: INTERNAL MEDICINE

## 2022-08-10 PROCEDURE — 93306 TTE W/DOPPLER COMPLETE: CPT

## 2022-08-11 ENCOUNTER — PATIENT MESSAGE (OUTPATIENT)
Dept: ENDOSCOPY | Facility: HOSPITAL | Age: 62
End: 2022-08-11
Payer: COMMERCIAL

## 2022-08-11 DIAGNOSIS — Z12.11 SPECIAL SCREENING FOR MALIGNANT NEOPLASMS, COLON: Primary | ICD-10-CM

## 2022-08-11 RX ORDER — POLYETHYLENE GLYCOL 3350, SODIUM SULFATE ANHYDROUS, SODIUM BICARBONATE, SODIUM CHLORIDE, POTASSIUM CHLORIDE 236; 22.74; 6.74; 5.86; 2.97 G/4L; G/4L; G/4L; G/4L; G/4L
4 POWDER, FOR SOLUTION ORAL ONCE
Qty: 4000 ML | Refills: 0 | Status: SHIPPED | OUTPATIENT
Start: 2022-08-11 | End: 2022-08-13

## 2022-09-06 ENCOUNTER — HOSPITAL ENCOUNTER (OUTPATIENT)
Facility: HOSPITAL | Age: 62
Discharge: HOME | End: 2022-09-06
Attending: COLON & RECTAL SURGERY | Admitting: COLON & RECTAL SURGERY
Payer: COMMERCIAL

## 2022-09-06 ENCOUNTER — ANESTHESIA EVENT (OUTPATIENT)
Dept: ENDOSCOPY | Facility: HOSPITAL | Age: 62
End: 2022-09-06
Payer: COMMERCIAL

## 2022-09-06 ENCOUNTER — ANESTHESIA (OUTPATIENT)
Dept: ENDOSCOPY | Facility: HOSPITAL | Age: 62
End: 2022-09-06
Payer: COMMERCIAL

## 2022-09-06 VITALS
BODY MASS INDEX: 26.21 KG/M2 | SYSTOLIC BLOOD PRESSURE: 149 MMHG | HEIGHT: 67 IN | TEMPERATURE: 98 F | DIASTOLIC BLOOD PRESSURE: 86 MMHG | WEIGHT: 167 LBS | OXYGEN SATURATION: 97 % | RESPIRATION RATE: 18 BRPM | HEART RATE: 62 BPM

## 2022-09-06 DIAGNOSIS — K63.5 COLON POLYP: ICD-10-CM

## 2022-09-06 PROCEDURE — 27201012 HC FORCEPS, HOT/COLD, DISP: Performed by: COLON & RECTAL SURGERY

## 2022-09-06 PROCEDURE — E9220 PRA ENDO ANESTHESIA: ICD-10-PCS | Mod: 33,,, | Performed by: NURSE ANESTHETIST, CERTIFIED REGISTERED

## 2022-09-06 PROCEDURE — 45385 COLONOSCOPY W/LESION REMOVAL: CPT | Mod: PT | Performed by: COLON & RECTAL SURGERY

## 2022-09-06 PROCEDURE — 45385 COLONOSCOPY W/LESION REMOVAL: CPT | Mod: 33,,, | Performed by: COLON & RECTAL SURGERY

## 2022-09-06 PROCEDURE — 88305 TISSUE EXAM BY PATHOLOGIST: ICD-10-PCS | Mod: 26,,, | Performed by: PATHOLOGY

## 2022-09-06 PROCEDURE — 45385 PR COLONOSCOPY,REMV LESN,SNARE: ICD-10-PCS | Mod: 33,,, | Performed by: COLON & RECTAL SURGERY

## 2022-09-06 PROCEDURE — 27201089 HC SNARE, DISP (ANY): Performed by: COLON & RECTAL SURGERY

## 2022-09-06 PROCEDURE — 45380 PR COLONOSCOPY,BIOPSY: ICD-10-PCS | Mod: 33,59,, | Performed by: COLON & RECTAL SURGERY

## 2022-09-06 PROCEDURE — 37000008 HC ANESTHESIA 1ST 15 MINUTES: Performed by: COLON & RECTAL SURGERY

## 2022-09-06 PROCEDURE — 63600175 PHARM REV CODE 636 W HCPCS: Performed by: NURSE ANESTHETIST, CERTIFIED REGISTERED

## 2022-09-06 PROCEDURE — 25000003 PHARM REV CODE 250: Performed by: NURSE ANESTHETIST, CERTIFIED REGISTERED

## 2022-09-06 PROCEDURE — 88305 TISSUE EXAM BY PATHOLOGIST: CPT | Mod: 59 | Performed by: PATHOLOGY

## 2022-09-06 PROCEDURE — 45380 COLONOSCOPY AND BIOPSY: CPT | Mod: 33,59,, | Performed by: COLON & RECTAL SURGERY

## 2022-09-06 PROCEDURE — E9220 PRA ENDO ANESTHESIA: HCPCS | Mod: 33,,, | Performed by: NURSE ANESTHETIST, CERTIFIED REGISTERED

## 2022-09-06 PROCEDURE — 88305 TISSUE EXAM BY PATHOLOGIST: CPT | Mod: 26,,, | Performed by: PATHOLOGY

## 2022-09-06 PROCEDURE — 37000009 HC ANESTHESIA EA ADD 15 MINS: Performed by: COLON & RECTAL SURGERY

## 2022-09-06 PROCEDURE — 45380 COLONOSCOPY AND BIOPSY: CPT | Mod: PT,59 | Performed by: COLON & RECTAL SURGERY

## 2022-09-06 RX ORDER — PROPOFOL 10 MG/ML
VIAL (ML) INTRAVENOUS
Status: DISCONTINUED | OUTPATIENT
Start: 2022-09-06 | End: 2022-09-06

## 2022-09-06 RX ORDER — SODIUM CHLORIDE 9 MG/ML
INJECTION, SOLUTION INTRAVENOUS CONTINUOUS
Status: DISCONTINUED | OUTPATIENT
Start: 2022-09-06 | End: 2022-09-06 | Stop reason: HOSPADM

## 2022-09-06 RX ORDER — LIDOCAINE HYDROCHLORIDE 20 MG/ML
INJECTION INTRAVENOUS
Status: DISCONTINUED | OUTPATIENT
Start: 2022-09-06 | End: 2022-09-06

## 2022-09-06 RX ADMIN — SODIUM CHLORIDE: 0.9 INJECTION, SOLUTION INTRAVENOUS at 09:09

## 2022-09-06 RX ADMIN — PROPOFOL 150 MCG/KG/MIN: 10 INJECTION, EMULSION INTRAVENOUS at 10:09

## 2022-09-06 RX ADMIN — PROPOFOL 50 MG: 10 INJECTION, EMULSION INTRAVENOUS at 10:09

## 2022-09-06 RX ADMIN — LIDOCAINE HYDROCHLORIDE 50 MG: 20 INJECTION, SOLUTION INTRAVENOUS at 10:09

## 2022-09-06 NOTE — ANESTHESIA POSTPROCEDURE EVALUATION
Anesthesia Post Evaluation    Patient: Kip Lauren    Procedure(s) Performed: Procedure(s) (LRB):  COLONOSCOPY (N/A)    Final Anesthesia Type: general      Patient location during evaluation: PACU  Patient participation: Yes- Able to Participate  Level of consciousness: awake and alert and oriented  Post-procedure vital signs: reviewed and stable  Pain management: adequate  Airway patency: patent    PONV status at discharge: No PONV  Anesthetic complications: no      Cardiovascular status: blood pressure returned to baseline and hemodynamically stable  Respiratory status: unassisted and spontaneous ventilation  Hydration status: euvolemic  Follow-up not needed.          Vitals Value Taken Time   /97 09/06/22 1107   Temp 36.8 °C (98.2 °F) 09/06/22 1054   Pulse 57 09/06/22 1107   Resp 18 09/06/22 1107   SpO2 97 % 09/06/22 1107         No case tracking events are documented in the log.      Pain/Florina Score: Florina Score: 10 (9/6/2022 11:08 AM)

## 2022-09-06 NOTE — TRANSFER OF CARE
"Anesthesia Transfer of Care Note    Patient: Kip Lauren    Procedure(s) Performed: Procedure(s) (LRB):  COLONOSCOPY (N/A)    Patient location: PACU    Anesthesia Type: general    Transport from OR: Transported from OR on room air with adequate spontaneous ventilation    Post pain: adequate analgesia    Post assessment: no apparent anesthetic complications and tolerated procedure well    Post vital signs: stable    Level of consciousness: awake    Nausea/Vomiting: no nausea/vomiting    Complications: none    Transfer of care protocol was followed      Last vitals:   Visit Vitals  BP (!) 186/94   Pulse 71   Temp 36.3 °C (97.3 °F)   Resp 15   Ht 5' 7" (1.702 m)   Wt 75.8 kg (167 lb)   SpO2 98%   BMI 26.16 kg/m²     "
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
## 2022-09-06 NOTE — [employer] PATIENT INSTRUCTIONS
Discharge Summary/Instructions after an Endoscopic Procedure  Patient Name: Kip Lauren  Patient MRN: [id number]  Patient YOB: 1960 Tuesday, September 6, 2022  Arvin Sandhu MD  Dear patient,  As a result of recent federal legislation (The Federal Cures Act), you may   receive lab or pathology results from your procedure in your [facility]   account before your physician is able to contact you. Your physician or   their representative will relay the results to you with their   recommendations at their soonest availability.  Thank you,  RESTRICTIONS:  During your procedure today, you received medications for sedation.  These   medications may affect your judgment, balance and coordination.  Therefore,   for 24 hours, you have the following restrictions:   - DO NOT drive a car, operate machinery, make legal/financial decisions,   sign important papers or drink alcohol.    ACTIVITY:  Today: no heavy lifting, straining or running due to procedural   sedation/anesthesia.  The following day: return to full activity including work.  DIET:  Eat and drink normally unless instructed otherwise.     TREATMENT FOR COMMON SIDE EFFECTS:  - Mild abdominal pain, nausea, belching, bloating or excessive gas:  rest,   eat lightly and use a heating pad.  - Sore Throat: treat with throat lozenges and/or gargle with warm salt   water.  - Because air was used during the procedure, expelling large amounts of air   from your rectum or belching is normal.  - If a bowel prep was taken, you may not have a bowel movement for 1-3 days.    This is normal.  SYMPTOMS TO WATCH FOR AND REPORT TO YOUR PHYSICIAN:  1. Abdominal pain or bloating, other than gas cramps.  2. Chest pain.  3. Back pain.  4. Signs of infection such as: chills or fever occurring within 24 hours   after the procedure.  5. Rectal bleeding, which would show as bright red, maroon, or black stools.   (A tablespoon of blood from the rectum is not serious, especially  if   hemorrhoids are present.)  6. Vomiting.  7. Weakness or dizziness.  GO DIRECTLY TO THE NEAREST EMERGENCY ROOM IF YOU HAVE ANY OF THE FOLLOWING:      Difficulty breathing              Chills and/or fever over 101 F   Persistent vomiting and/or vomiting blood   Severe abdominal pain   Severe chest pain   Black, tarry stools   Bleeding- more than one tablespoon   Any other symptom or condition that you feel may need urgent attention  Your doctor recommends these additional instructions:  If any biopsies were taken, your doctors clinic will contact you in 1 to 2   weeks with any results.  - Discharge patient to home.   - Resume previous diet.   - Continue present medications.   - Await pathology results.   - Repeat colonoscopy date to be determined after pending pathology results   are reviewed for surveillance based on pathology results.   - Patient has a contact number available for emergencies.  The signs and   symptoms of potential delayed complications were discussed with the   patient.  Return to normal activities tomorrow.  Written discharge   instructions were provided to the patient.  For questions, problems or results please call your physician - Arvin Sandhu MD at Work:  [phone].  [facility] [place], EMERGENCY ROOM PHONE NUMBER: [phone]  IF A COMPLICATION OR EMERGENCY SITUATION ARISES AND YOU ARE UNABLE TO REACH   YOUR PHYSICIAN - GO DIRECTLY TO THE EMERGENCY ROOM.  Arvin Sandhu MD  9/6/2022 10:53:50 AM  This report has been verified and signed electronically.  Dear patient,  As a result of recent federal legislation (The Federal Cures Act), you may   receive lab or pathology results from your procedure in your [facility]   account before your physician is able to contact you. Your physician or   their representative will relay the results to you with their   recommendations at their soonest availability.  Thank you,  [employer]ATION
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
## 2022-09-09 LAB
FINAL PATHOLOGIC DIAGNOSIS: NORMAL
GROSS: NORMAL
Lab: NORMAL

## 2022-11-09 ENCOUNTER — IMMUNIZATION (OUTPATIENT)
Dept: INTERNAL MEDICINE | Facility: CLINIC | Age: 62
End: 2022-11-09
Payer: COMMERCIAL

## 2022-11-09 PROCEDURE — 90686 FLU VACCINE (QUAD) GREATER THAN OR EQUAL TO 3YO PRESERVATIVE FREE IM: ICD-10-PCS | Mod: S$GLB,,, | Performed by: INTERNAL MEDICINE

## 2022-11-09 PROCEDURE — 90471 FLU VACCINE (QUAD) GREATER THAN OR EQUAL TO 3YO PRESERVATIVE FREE IM: ICD-10-PCS | Mod: S$GLB,,, | Performed by: INTERNAL MEDICINE

## 2022-11-09 PROCEDURE — 90686 IIV4 VACC NO PRSV 0.5 ML IM: CPT | Mod: S$GLB,,, | Performed by: INTERNAL MEDICINE

## 2022-11-09 PROCEDURE — 90471 IMMUNIZATION ADMIN: CPT | Mod: S$GLB,,, | Performed by: INTERNAL MEDICINE

## 2023-02-21 NOTE — PATIENT INSTRUCTIONS
TDAP vaccine today    Flu shot today    If not contacted in a couple weeks by colonoscopy scheduling department - call Colonoscopy Scheduling Number - [phone].    Information about cholesterol, high blood pressure and healthy diet and activity recommendations can be found at the following links on the Internet:    http://www.nhlbi.nih.gov/health/health-topics/topics/hbc  http://www.nhlbi.nih.gov/health/educational/lose_wt/index.htm  Http://www.nhlbi.nih.gov/files/docs/public/heart/hbp_low.pdf  http://www.heart.org/HEARTORG/  http://diabetes.org/  https://www.cdc.gov/  Https://healthfinder.gov/  https://health.gov/dietaryguidelines/2015/guidelines/  https://health.gov/paguidelines/second-edition/pdf/Physical_Activity_Guidelines_2nd_edition.pdf     multiple medical complaints

## 2023-03-16 ENCOUNTER — LAB VISIT (OUTPATIENT)
Dept: LAB | Facility: HOSPITAL | Age: 63
End: 2023-03-16
Attending: FAMILY MEDICINE
Payer: COMMERCIAL

## 2023-03-16 ENCOUNTER — OFFICE VISIT (OUTPATIENT)
Dept: INTERNAL MEDICINE | Facility: CLINIC | Age: 63
End: 2023-03-16
Attending: FAMILY MEDICINE
Payer: COMMERCIAL

## 2023-03-16 VITALS
HEIGHT: 67 IN | HEART RATE: 62 BPM | OXYGEN SATURATION: 97 % | SYSTOLIC BLOOD PRESSURE: 129 MMHG | DIASTOLIC BLOOD PRESSURE: 70 MMHG | BODY MASS INDEX: 25.9 KG/M2 | WEIGHT: 165 LBS | TEMPERATURE: 98 F

## 2023-03-16 DIAGNOSIS — K76.0 HEPATIC STEATOSIS: ICD-10-CM

## 2023-03-16 DIAGNOSIS — Z87.891 PERSONAL HISTORY OF NICOTINE DEPENDENCE: ICD-10-CM

## 2023-03-16 DIAGNOSIS — Z89.421 ACQUIRED ABSENCE OF OTHER RIGHT TOE(S): ICD-10-CM

## 2023-03-16 DIAGNOSIS — Z12.5 PROSTATE CANCER SCREENING: ICD-10-CM

## 2023-03-16 DIAGNOSIS — E78.5 HYPERLIPIDEMIA, UNSPECIFIED HYPERLIPIDEMIA TYPE: ICD-10-CM

## 2023-03-16 DIAGNOSIS — I77.89 ASCENDING AORTA ENLARGEMENT: ICD-10-CM

## 2023-03-16 DIAGNOSIS — I25.10 ASCVD (ARTERIOSCLEROTIC CARDIOVASCULAR DISEASE): ICD-10-CM

## 2023-03-16 DIAGNOSIS — Z00.00 ANNUAL PHYSICAL EXAM: ICD-10-CM

## 2023-03-16 DIAGNOSIS — Z23 NEED FOR PNEUMOCOCCAL VACCINATION: ICD-10-CM

## 2023-03-16 DIAGNOSIS — K63.5 POLYP OF COLON, UNSPECIFIED PART OF COLON, UNSPECIFIED TYPE: ICD-10-CM

## 2023-03-16 DIAGNOSIS — J43.2 CENTRILOBULAR EMPHYSEMA: ICD-10-CM

## 2023-03-16 DIAGNOSIS — Z12.11 COLON CANCER SCREENING: ICD-10-CM

## 2023-03-16 DIAGNOSIS — Z00.00 ANNUAL PHYSICAL EXAM: Primary | ICD-10-CM

## 2023-03-16 DIAGNOSIS — I10 HYPERTENSION, ESSENTIAL: ICD-10-CM

## 2023-03-16 DIAGNOSIS — R97.20 ELEVATED PSA: ICD-10-CM

## 2023-03-16 PROBLEM — Z53.20 COLONOSCOPY REFUSED: Status: RESOLVED | Noted: 2020-08-25 | Resolved: 2023-03-16

## 2023-03-16 LAB
ALBUMIN SERPL BCP-MCNC: 4.1 G/DL (ref 3.5–5.2)
ALP SERPL-CCNC: 80 U/L (ref 55–135)
ALT SERPL W/O P-5'-P-CCNC: 30 U/L (ref 10–44)
ANION GAP SERPL CALC-SCNC: 8 MMOL/L (ref 8–16)
AST SERPL-CCNC: 22 U/L (ref 10–40)
BILIRUB SERPL-MCNC: 1.5 MG/DL (ref 0.1–1)
BUN SERPL-MCNC: 10 MG/DL (ref 6–20)
CALCIUM SERPL-MCNC: 9.8 MG/DL (ref 8.7–10.5)
CHLORIDE SERPL-SCNC: 102 MMOL/L (ref 95–110)
CHOLEST SERPL-MCNC: 163 MG/DL (ref 120–199)
CHOLEST/HDLC SERPL: 2.1 {RATIO} (ref 2–5)
CO2 SERPL-SCNC: 30 MMOL/L (ref 23–29)
COMPLEXED PSA SERPL-MCNC: 3.3 NG/ML (ref 0–4)
CREAT SERPL-MCNC: 0.8 MG/DL (ref 0.5–1.4)
EST. GFR  (NO RACE VARIABLE): >60 ML/MIN/1.73 M^2
ESTIMATED AVG GLUCOSE: 91 MG/DL (ref 68–131)
GLUCOSE SERPL-MCNC: 102 MG/DL (ref 70–110)
HBA1C MFR BLD: 4.8 % (ref 4–5.6)
HDLC SERPL-MCNC: 79 MG/DL (ref 40–75)
HDLC SERPL: 48.5 % (ref 20–50)
LDLC SERPL CALC-MCNC: 75.8 MG/DL (ref 63–159)
NONHDLC SERPL-MCNC: 84 MG/DL
POTASSIUM SERPL-SCNC: 4 MMOL/L (ref 3.5–5.1)
PROT SERPL-MCNC: 7.2 G/DL (ref 6–8.4)
SODIUM SERPL-SCNC: 140 MMOL/L (ref 136–145)
TRIGL SERPL-MCNC: 41 MG/DL (ref 30–150)

## 2023-03-16 PROCEDURE — 3008F BODY MASS INDEX DOCD: CPT | Mod: CPTII,S$GLB,, | Performed by: FAMILY MEDICINE

## 2023-03-16 PROCEDURE — 3078F PR MOST RECENT DIASTOLIC BLOOD PRESSURE < 80 MM HG: ICD-10-PCS | Mod: CPTII,S$GLB,, | Performed by: FAMILY MEDICINE

## 2023-03-16 PROCEDURE — 1159F MED LIST DOCD IN RCRD: CPT | Mod: CPTII,S$GLB,, | Performed by: FAMILY MEDICINE

## 2023-03-16 PROCEDURE — 99999 PR PBB SHADOW E&M-EST. PATIENT-LVL V: ICD-10-PCS | Mod: PBBFAC,,, | Performed by: FAMILY MEDICINE

## 2023-03-16 PROCEDURE — 90677 PNEUMOCOCCAL CONJUGATE VACCINE 20-VALENT: ICD-10-PCS | Mod: S$GLB,,, | Performed by: FAMILY MEDICINE

## 2023-03-16 PROCEDURE — 3078F DIAST BP <80 MM HG: CPT | Mod: CPTII,S$GLB,, | Performed by: FAMILY MEDICINE

## 2023-03-16 PROCEDURE — 1160F PR REVIEW ALL MEDS BY PRESCRIBER/CLIN PHARMACIST DOCUMENTED: ICD-10-PCS | Mod: CPTII,S$GLB,, | Performed by: FAMILY MEDICINE

## 2023-03-16 PROCEDURE — 90677 PCV20 VACCINE IM: CPT | Mod: S$GLB,,, | Performed by: FAMILY MEDICINE

## 2023-03-16 PROCEDURE — 90471 IMMUNIZATION ADMIN: CPT | Mod: S$GLB,,, | Performed by: FAMILY MEDICINE

## 2023-03-16 PROCEDURE — 3074F SYST BP LT 130 MM HG: CPT | Mod: CPTII,S$GLB,, | Performed by: FAMILY MEDICINE

## 2023-03-16 PROCEDURE — 84153 ASSAY OF PSA TOTAL: CPT | Performed by: FAMILY MEDICINE

## 2023-03-16 PROCEDURE — 99396 PR PREVENTIVE VISIT,EST,40-64: ICD-10-PCS | Mod: 25,S$GLB,, | Performed by: FAMILY MEDICINE

## 2023-03-16 PROCEDURE — 4010F ACE/ARB THERAPY RXD/TAKEN: CPT | Mod: CPTII,S$GLB,, | Performed by: FAMILY MEDICINE

## 2023-03-16 PROCEDURE — 36415 COLL VENOUS BLD VENIPUNCTURE: CPT | Performed by: FAMILY MEDICINE

## 2023-03-16 PROCEDURE — 83036 HEMOGLOBIN GLYCOSYLATED A1C: CPT | Performed by: FAMILY MEDICINE

## 2023-03-16 PROCEDURE — 90471 PNEUMOCOCCAL CONJUGATE VACCINE 20-VALENT: ICD-10-PCS | Mod: S$GLB,,, | Performed by: FAMILY MEDICINE

## 2023-03-16 PROCEDURE — 4010F PR ACE/ARB THEARPY RXD/TAKEN: ICD-10-PCS | Mod: CPTII,S$GLB,, | Performed by: FAMILY MEDICINE

## 2023-03-16 PROCEDURE — 80053 COMPREHEN METABOLIC PANEL: CPT | Performed by: FAMILY MEDICINE

## 2023-03-16 PROCEDURE — 99396 PREV VISIT EST AGE 40-64: CPT | Mod: 25,S$GLB,, | Performed by: FAMILY MEDICINE

## 2023-03-16 PROCEDURE — 3008F PR BODY MASS INDEX (BMI) DOCUMENTED: ICD-10-PCS | Mod: CPTII,S$GLB,, | Performed by: FAMILY MEDICINE

## 2023-03-16 PROCEDURE — 3074F PR MOST RECENT SYSTOLIC BLOOD PRESSURE < 130 MM HG: ICD-10-PCS | Mod: CPTII,S$GLB,, | Performed by: FAMILY MEDICINE

## 2023-03-16 PROCEDURE — 99999 PR PBB SHADOW E&M-EST. PATIENT-LVL V: CPT | Mod: PBBFAC,,, | Performed by: FAMILY MEDICINE

## 2023-03-16 PROCEDURE — 1160F RVW MEDS BY RX/DR IN RCRD: CPT | Mod: CPTII,S$GLB,, | Performed by: FAMILY MEDICINE

## 2023-03-16 PROCEDURE — 80061 LIPID PANEL: CPT | Performed by: FAMILY MEDICINE

## 2023-03-16 PROCEDURE — 1159F PR MEDICATION LIST DOCUMENTED IN MEDICAL RECORD: ICD-10-PCS | Mod: CPTII,S$GLB,, | Performed by: FAMILY MEDICINE

## 2023-03-16 RX ORDER — ATORVASTATIN CALCIUM 20 MG/1
20 TABLET, FILM COATED ORAL DAILY
Qty: 90 TABLET | Refills: 3 | Status: SHIPPED | OUTPATIENT
Start: 2023-03-16 | End: 2024-03-18

## 2023-03-16 RX ORDER — LOSARTAN POTASSIUM 100 MG/1
100 TABLET ORAL DAILY
Qty: 90 TABLET | Refills: 3 | Status: SHIPPED | OUTPATIENT
Start: 2023-03-16 | End: 2024-03-18 | Stop reason: SDUPTHER

## 2023-03-16 RX ORDER — AMLODIPINE BESYLATE 10 MG/1
10 TABLET ORAL DAILY
Qty: 90 TABLET | Refills: 3 | Status: SHIPPED | OUTPATIENT
Start: 2023-03-16 | End: 2024-03-18 | Stop reason: SDUPTHER

## 2023-03-16 RX ORDER — HYDROCHLOROTHIAZIDE 25 MG/1
25 TABLET ORAL DAILY
Qty: 90 TABLET | Refills: 3 | Status: SHIPPED | OUTPATIENT
Start: 2023-03-16 | End: 2024-03-18 | Stop reason: SDUPTHER

## 2023-03-16 NOTE — PROGRESS NOTES
Subjective:       Patient ID: Kip Lauren is a 62 y.o. male.    Chief Complaint: Annual Exam    Established patient for an annual wellness check/physical exam and also chronic disease management. Specific complaints - see dictation, M*model entries and please see ROS.  Past, Surgical, Family, Social Histories; Medications, Allergies reviewed and reconciled.  Health maintenance file reviewed and addressed items due. Recent applicable lab, imaging and cardiovascular results reviewed.  Problem list items reviewed and modified or added entries (in the overview section) may not be transcribed into this encounter note due to note writer format.    Toe amp. 7/22. O/w NSIHx. Presents with wife.      Review of Systems   Constitutional:  Negative for appetite change, chills, diaphoresis, fatigue and fever.   HENT:  Negative for congestion, postnasal drip, rhinorrhea, sore throat and trouble swallowing.    Eyes:  Negative for visual disturbance.   Respiratory:  Negative for cough, choking, chest tightness, shortness of breath and wheezing.    Cardiovascular:  Negative for chest pain and leg swelling.   Gastrointestinal:  Negative for abdominal distention, abdominal pain, diarrhea, nausea and vomiting.   Genitourinary:  Negative for difficulty urinating and hematuria.   Musculoskeletal:  Positive for arthralgias. Negative for myalgias.   Skin:  Negative for rash.   Neurological:  Negative for weakness, light-headedness and headaches.   Psychiatric/Behavioral:  Negative for confusion and dysphoric mood.      Objective:      Physical Exam  Vitals and nursing note reviewed.   Constitutional:       Appearance: He is well-developed. He is not diaphoretic.   HENT:      Mouth/Throat:      Dentition: Abnormal dentition.   Eyes:      General: No scleral icterus.  Neck:      Thyroid: No thyromegaly.      Vascular: No carotid bruit or JVD.      Trachea: No tracheal deviation.   Cardiovascular:      Rate and Rhythm: Normal rate  and regular rhythm.      Heart sounds: Normal heart sounds. No murmur heard.    No friction rub. No gallop.   Pulmonary:      Effort: Pulmonary effort is normal. No respiratory distress.      Breath sounds: Normal breath sounds. No wheezing or rales.   Abdominal:      General: There is no distension.      Palpations: Abdomen is soft. There is no mass.      Tenderness: There is no abdominal tenderness. There is no guarding or rebound.   Musculoskeletal:      Cervical back: Normal range of motion and neck supple.   Lymphadenopathy:      Cervical: No cervical adenopathy.   Skin:     General: Skin is warm and dry.      Findings: No erythema or rash.   Neurological:      Mental Status: He is alert and oriented to person, place, and time.      Cranial Nerves: No cranial nerve deficit.      Motor: No tremor.      Coordination: Coordination normal.      Gait: Gait normal.   Psychiatric:         Behavior: Behavior normal.         Thought Content: Thought content normal.         Judgment: Judgment normal.       Assessment:       1. Annual physical exam    2. Acquired absence of other right toe(s)    3. Ascending aorta enlargement    4. Centrilobular emphysema    5. Hypertension, essential    6. Hyperlipidemia, unspecified hyperlipidemia type    7. Prostate cancer screening    8. Elevated PSA    9. ASCVD (arteriosclerotic cardiovascular disease)    10. Hepatic steatosis    11. Personal history of nicotine dependence    12. Polyp of colon, unspecified part of colon, unspecified type    13. Colon cancer screening          Plan:     Medication List with Changes/Refills   Current Medications    ACETAMINOPHEN (TYLENOL) 500 MG TABLET    Take 2 tablets (1,000 mg total) by mouth every 8 (eight) hours.    AMMONIUM LACTATE 12 % CREA    Apply twice daily to affected parts both feet as needed.    ASPIRIN (ECOTRIN) 81 MG EC TABLET    Take 81 mg by mouth once daily.    DICLOFENAC (VOLTAREN) 75 MG EC TABLET    Take 1 tablet (75 mg total) by  mouth 2 (two) times daily as needed (pain or arthritis).    ECONAZOLE NITRATE 1 % CREAM    Apply topically 2 (two) times daily. 2-4 weeks   Changed and/or Refilled Medications    Modified Medication Previous Medication    AMLODIPINE (NORVASC) 10 MG TABLET amLODIPine (NORVASC) 10 MG tablet       Take 1 tablet (10 mg total) by mouth once daily.    Take 1 tablet (10 mg total) by mouth once daily.    ATORVASTATIN (LIPITOR) 20 MG TABLET atorvastatin (LIPITOR) 20 MG tablet       Take 1 tablet (20 mg total) by mouth once daily.    Take 1 tablet (20 mg total) by mouth once daily.    HYDROCHLOROTHIAZIDE (HYDRODIURIL) 25 MG TABLET hydroCHLOROthiazide (HYDRODIURIL) 25 MG tablet       Take 1 tablet (25 mg total) by mouth once daily.    Take 1 tablet (25 mg total) by mouth once daily.    LOSARTAN (COZAAR) 100 MG TABLET losartan (COZAAR) 100 MG tablet       Take 1 tablet (100 mg total) by mouth once daily.    Take 1 tablet (100 mg total) by mouth once daily.   Discontinued Medications    GABAPENTIN (NEURONTIN) 300 MG CAPSULE    Take 1 capsule (300 mg total) by mouth 3 (three) times daily as needed (for burning/nerve pain).    MELOXICAM (MOBIC) 15 MG TABLET    Take 1 tablet (15 mg total) by mouth once daily.    OXYCODONE (ROXICODONE) 5 MG IMMEDIATE RELEASE TABLET    Take 1 tablet (5 mg total) by mouth every 4 (four) hours as needed for Pain.    SULFAMETHOXAZOLE-TRIMETHOPRIM 800-160MG (BACTRIM DS) 800-160 MG TAB    Take 1 tablet by mouth 2 (two) times daily.     1. Annual physical exam  -     Comprehensive Metabolic Panel; Future; Expected date: 03/16/2023  -     Lipid Panel; Future; Expected date: 03/16/2023  -     PSA, Screening; Future; Expected date: 03/16/2023  -     Hemoglobin A1C; Future; Expected date: 03/16/2023    2. Acquired absence of other right toe(s)  Overview:  -R 5th toe amp 7/14/2022 - d/t old injury and deformity      3. Ascending aorta enlargement  Overview:  -aortic root and ascending aorta mild, echo 8/10/2022  and prior chest CT scans    Assessment & Plan:  -last cardiology eval 4/19/2021    Orders:  -     Ambulatory referral/consult to Cardiology; Future; Expected date: 03/23/2023    4. Centrilobular emphysema  Overview:  Noted on LD CT (ref 4/19/2022)      5. Hypertension, essential  -     Comprehensive Metabolic Panel; Future; Expected date: 03/16/2023  -     amLODIPine (NORVASC) 10 MG tablet; Take 1 tablet (10 mg total) by mouth once daily.  Dispense: 90 tablet; Refill: 3  -     hydroCHLOROthiazide (HYDRODIURIL) 25 MG tablet; Take 1 tablet (25 mg total) by mouth once daily.  Dispense: 90 tablet; Refill: 3  -     losartan (COZAAR) 100 MG tablet; Take 1 tablet (100 mg total) by mouth once daily.  Dispense: 90 tablet; Refill: 3    6. Hyperlipidemia, unspecified hyperlipidemia type  -     Lipid Panel; Future; Expected date: 03/16/2023  -     atorvastatin (LIPITOR) 20 MG tablet; Take 1 tablet (20 mg total) by mouth once daily.  Dispense: 90 tablet; Refill: 3    7. Prostate cancer screening  -     PSA, Screening; Future; Expected date: 03/16/2023    8. Elevated PSA  Overview:  -Biopsy 7/20/2021 - suspicious but nondiagnostic core noted on 1 section  - eval 10/25/2021 (no f/u rec's)  -PSA subsequently nml    Orders:  -     Ambulatory referral/consult to Urology; Future; Expected date: 03/23/2023    9. ASCVD (arteriosclerotic cardiovascular disease)  Overview:  Coronary and aortic calcifications noted on LD CT, multiple, on statin    Orders:  -     Ambulatory referral/consult to Cardiology; Future; Expected date: 03/23/2023    10. Hepatic steatosis  Overview:  -noted on 4/19/2022 LDCT      11. Personal history of nicotine dependence  Overview:  -since age 13, still active  -Not interested in smoking cessation    Orders:  -     CT Chest Lung Screening Low Dose; Future; Expected date: 03/16/2023    12. Polyp of colon, unspecified part of colon, unspecified type  Assessment & Plan:  -c/s due this fall for 1-year (multiple  polyps)    Orders:  -     Ambulatory referral/consult to Endo Procedure ; Future; Expected date: 09/16/2023    13. Colon cancer screening  -     Ambulatory referral/consult to Endo Procedure ; Future; Expected date: 09/16/2023      See meds, orders, follow up, routing and instructions sections of encounter and AVS. Discussed with patient and provided on AVS.    Discussed diet and exercise and links provided on AVS for detailed information.  Lab Results   Component Value Date     03/16/2021    K 4.2 03/16/2021    CL 98 03/16/2021    BUN 11 03/16/2021    CREATININE 0.8 03/16/2021    GLU 97 03/16/2021    HGBA1C 5.4 09/27/2013    AST 24 03/16/2021    ALT 29 03/16/2021    ALBUMIN 4.1 03/16/2021    PROT 7.8 03/16/2021    BILITOT 1.2 (H) 03/16/2021    CHOL 164 03/08/2022    HDL 85 (H) 03/08/2022    LDLCALC 69.4 03/08/2022    TRIG 48 03/08/2022    WBC 6.95 03/08/2022    HGB 15.0 03/08/2022    HCT 44.8 03/08/2022     03/08/2022    PSA 2.8 03/08/2022    PSADIAG 2.1 10/22/2021    TSH 1.174 11/08/2017

## 2023-03-24 ENCOUNTER — HOSPITAL ENCOUNTER (OUTPATIENT)
Dept: RADIOLOGY | Facility: HOSPITAL | Age: 63
Discharge: HOME | End: 2023-03-24
Attending: FAMILY MEDICINE
Payer: COMMERCIAL

## 2023-03-24 DIAGNOSIS — Z87.891 PERSONAL HISTORY OF NICOTINE DEPENDENCE: ICD-10-CM

## 2023-03-24 PROCEDURE — 71271 CT THORAX LUNG CANCER SCR C-: CPT | Mod: TC

## 2023-03-24 PROCEDURE — 71271 CT CHEST LUNG SCREENING LOW DOSE: ICD-10-PCS | Mod: 26,,, | Performed by: RADIOLOGY

## 2023-03-24 PROCEDURE — 71271 CT THORAX LUNG CANCER SCR C-: CPT | Mod: 26,,, | Performed by: RADIOLOGY

## 2023-03-25 ENCOUNTER — TELEPHONE (OUTPATIENT)
Dept: INTERNAL MEDICINE | Facility: CLINIC | Age: 63
End: 2023-03-25
Payer: COMMERCIAL

## 2023-03-25 DIAGNOSIS — I77.89 ASCENDING AORTA ENLARGEMENT: Primary | ICD-10-CM

## 2023-03-25 NOTE — TELEPHONE ENCOUNTER
Please call patient and explain that test(s) show some enlargement of the aorta near the heart. We need to get a follow up qith the cardiologist.    There were no other concerning areas on chest CT scan related to smoking history. Previously reported abnormalities remain unchanged or stable. I recommend a repeat scan in 1 year to continue surveillance and will forward a request to the staff to set this up. Please let me know if you are not contacted soon. Thank you.      Please see orders for  LD chest CT  and please schedule in 1 year.     ALSO    I would like to refer to the cardiology department for further evaluation and treatment. Please schedule.    Thank you.

## 2023-04-03 NOTE — TELEPHONE ENCOUNTER
Pt returned call. Reviewed results note from PCP, pt verbalized understanding. Pt states he is already scheduled for a cardiology appt and will call at a later time to schedule his lung CT for next year.

## 2023-04-03 NOTE — TELEPHONE ENCOUNTER
Called pt to discuss results note from PCP, no answer. Left message to return call. Will also send a portal message.

## 2023-04-13 ENCOUNTER — TELEPHONE (OUTPATIENT)
Dept: INTERNAL MEDICINE | Facility: CLINIC | Age: 63
End: 2023-04-13
Payer: COMMERCIAL

## 2023-04-13 DIAGNOSIS — R97.20 RISING PSA LEVEL: Primary | ICD-10-CM

## 2023-04-14 NOTE — TELEPHONE ENCOUNTER
Please call patient and explain that the test(s) show slight rise of the PSA from last year, but still in the normal range.    I would like to refer to the urology department for further evaluation and treatment.  This is to be on the safe side.    Please see referral orders and please call patient to schedule.     Thank you.

## 2023-04-18 NOTE — TELEPHONE ENCOUNTER
Called the pt to discuss test results and recommendations. I left the pt a VM asking for a call back.    [employer] messages sent to pt.

## 2023-04-22 ENCOUNTER — TELEPHONE (OUTPATIENT)
Dept: INTERNAL MEDICINE | Facility: CLINIC | Age: 63
End: 2023-04-22

## 2023-04-24 ENCOUNTER — TELEPHONE (OUTPATIENT)
Dept: INTERNAL MEDICINE | Facility: CLINIC | Age: 63
End: 2023-04-24
Payer: COMMERCIAL

## 2023-04-24 DIAGNOSIS — I77.89 ASCENDING AORTA ENLARGEMENT: Primary | ICD-10-CM

## 2023-04-24 DIAGNOSIS — R00.2 PALPITATIONS: ICD-10-CM

## 2023-04-24 DIAGNOSIS — R97.20 ELEVATED PSA: ICD-10-CM

## 2023-04-24 NOTE — ASSESSMENT & PLAN NOTE
Rise noted, 03/2023.  Referred to Urology, did not initially schedule.  Telephone reminder to nurse to schedule 04/24/2023

## 2023-04-24 NOTE — TELEPHONE ENCOUNTER
Please call patient and schedule 48 hour Holter and echo see orders.    Also, PSA rise from last visit a month ago, please schedule his urology visit, thank you.

## 2023-04-24 NOTE — TELEPHONE ENCOUNTER
Call patient Saturday concerning his test results and follow-up for CT scan that showed possible increased enlargement aortic root and ascending or aorta.  He has no chest pain or dyspnea.  He did discuss some palpitations which occur at rest described as heart racing for 5 or 10 seconds.  No syncope or near-syncope.  Does not occur while working.  He has an appointment with Cardiology pending next month.    Based on a history of prior arrhythmia requiring intervention, will order Holter monitor 24 hour.  Based on increased aortic size will order echo hopefully prior to cardiology visit.  Patient agrees to above.  He does have some insurance co-payment challenges historically.

## 2023-05-02 ENCOUNTER — PATIENT MESSAGE (OUTPATIENT)
Dept: INTERNAL MEDICINE | Facility: CLINIC | Age: 63
End: 2023-05-02
Payer: COMMERCIAL

## 2023-05-17 ENCOUNTER — TELEPHONE (OUTPATIENT)
Dept: CARDIOTHORACIC SURGERY | Facility: CLINIC | Age: 63
End: 2023-05-17
Payer: COMMERCIAL

## 2023-05-17 ENCOUNTER — OFFICE VISIT (OUTPATIENT)
Dept: CARDIOLOGY | Facility: CLINIC | Age: 63
End: 2023-05-17
Payer: COMMERCIAL

## 2023-05-17 VITALS
WEIGHT: 167.13 LBS | BODY MASS INDEX: 26.23 KG/M2 | HEIGHT: 67 IN | DIASTOLIC BLOOD PRESSURE: 83 MMHG | HEART RATE: 76 BPM | SYSTOLIC BLOOD PRESSURE: 146 MMHG

## 2023-05-17 DIAGNOSIS — I10 HYPERTENSION, ESSENTIAL: ICD-10-CM

## 2023-05-17 DIAGNOSIS — Z98.890 HISTORY OF RADIOFREQUENCY ABLATION (RFA) PROCEDURE FOR CARDIAC ARRHYTHMIA: ICD-10-CM

## 2023-05-17 DIAGNOSIS — Z13.6 ENCOUNTER FOR SCREENING FOR CARDIOVASCULAR DISORDERS: ICD-10-CM

## 2023-05-17 DIAGNOSIS — I77.89 ASCENDING AORTA ENLARGEMENT: ICD-10-CM

## 2023-05-17 DIAGNOSIS — J43.2 CENTRILOBULAR EMPHYSEMA: Primary | ICD-10-CM

## 2023-05-17 DIAGNOSIS — E78.00 PURE HYPERCHOLESTEROLEMIA: ICD-10-CM

## 2023-05-17 DIAGNOSIS — Z87.891 PERSONAL HISTORY OF NICOTINE DEPENDENCE: ICD-10-CM

## 2023-05-17 DIAGNOSIS — I25.10 ASCVD (ARTERIOSCLEROTIC CARDIOVASCULAR DISEASE): ICD-10-CM

## 2023-05-17 DIAGNOSIS — I71.21 ANEURYSM OF ASCENDING AORTA WITHOUT RUPTURE: ICD-10-CM

## 2023-05-17 PROCEDURE — 3079F DIAST BP 80-89 MM HG: CPT | Mod: CPTII,S$GLB,, | Performed by: INTERNAL MEDICINE

## 2023-05-17 PROCEDURE — 99999 PR PBB SHADOW E&M-EST. PATIENT-LVL IV: ICD-10-PCS | Mod: PBBFAC,,, | Performed by: INTERNAL MEDICINE

## 2023-05-17 PROCEDURE — 3077F PR MOST RECENT SYSTOLIC BLOOD PRESSURE >= 140 MM HG: ICD-10-PCS | Mod: CPTII,S$GLB,, | Performed by: INTERNAL MEDICINE

## 2023-05-17 PROCEDURE — 99999 PR PBB SHADOW E&M-EST. PATIENT-LVL IV: CPT | Mod: PBBFAC,,, | Performed by: INTERNAL MEDICINE

## 2023-05-17 PROCEDURE — 3044F PR MOST RECENT HEMOGLOBIN A1C LEVEL <7.0%: ICD-10-PCS | Mod: CPTII,S$GLB,, | Performed by: INTERNAL MEDICINE

## 2023-05-17 PROCEDURE — 3044F HG A1C LEVEL LT 7.0%: CPT | Mod: CPTII,S$GLB,, | Performed by: INTERNAL MEDICINE

## 2023-05-17 PROCEDURE — 3077F SYST BP >= 140 MM HG: CPT | Mod: CPTII,S$GLB,, | Performed by: INTERNAL MEDICINE

## 2023-05-17 PROCEDURE — 4010F ACE/ARB THERAPY RXD/TAKEN: CPT | Mod: CPTII,S$GLB,, | Performed by: INTERNAL MEDICINE

## 2023-05-17 PROCEDURE — 3008F PR BODY MASS INDEX (BMI) DOCUMENTED: ICD-10-PCS | Mod: CPTII,S$GLB,, | Performed by: INTERNAL MEDICINE

## 2023-05-17 PROCEDURE — 1159F PR MEDICATION LIST DOCUMENTED IN MEDICAL RECORD: ICD-10-PCS | Mod: CPTII,S$GLB,, | Performed by: INTERNAL MEDICINE

## 2023-05-17 PROCEDURE — 4010F PR ACE/ARB THEARPY RXD/TAKEN: ICD-10-PCS | Mod: CPTII,S$GLB,, | Performed by: INTERNAL MEDICINE

## 2023-05-17 PROCEDURE — 1159F MED LIST DOCD IN RCRD: CPT | Mod: CPTII,S$GLB,, | Performed by: INTERNAL MEDICINE

## 2023-05-17 PROCEDURE — 99213 OFFICE O/P EST LOW 20 MIN: CPT | Mod: S$GLB,,, | Performed by: INTERNAL MEDICINE

## 2023-05-17 PROCEDURE — 3079F PR MOST RECENT DIASTOLIC BLOOD PRESSURE 80-89 MM HG: ICD-10-PCS | Mod: CPTII,S$GLB,, | Performed by: INTERNAL MEDICINE

## 2023-05-17 PROCEDURE — 99213 PR OFFICE/OUTPT VISIT, EST, LEVL III, 20-29 MIN: ICD-10-PCS | Mod: S$GLB,,, | Performed by: INTERNAL MEDICINE

## 2023-05-17 PROCEDURE — 3008F BODY MASS INDEX DOCD: CPT | Mod: CPTII,S$GLB,, | Performed by: INTERNAL MEDICINE
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
## 2023-05-17 NOTE — TELEPHONE ENCOUNTER
Called pt regarding referral for Aneurysmal dilatation of the aortic root.  Pt requested to be scheduled on Thursday, May 25. Pt scheduled to see Dr. Romano on May 25 and was provided with appt time and location.  Appt slip mailed to pt.    ----- Message from Dilma House RN sent at 5/17/2023 12:22 PM CDT -----  Regarding: FW: appt  Contact: @[phone]    ----- Message -----  From: Jeffrey Ferreira  Sent: 5/17/2023  12:13 PM CDT  To: [facility] Thoracic Surgery Clinical Support  Subject: appt                                             Pt calling to get appt sched for Aneurysm of ascending aorta without rupture [I71.21 reff in system unable to schedule... pls call and adv@[phone]

## 2023-05-25 ENCOUNTER — TELEPHONE (OUTPATIENT)
Dept: CARDIOTHORACIC SURGERY | Facility: CLINIC | Age: 63
End: 2023-05-25
Payer: COMMERCIAL

## 2023-05-25 ENCOUNTER — OFFICE VISIT (OUTPATIENT)
Dept: CARDIOTHORACIC SURGERY | Facility: CLINIC | Age: 63
End: 2023-05-25
Payer: COMMERCIAL

## 2023-05-25 ENCOUNTER — HOSPITAL ENCOUNTER (OUTPATIENT)
Dept: RADIOLOGY | Facility: HOSPITAL | Age: 63
Discharge: HOME | End: 2023-05-25
Attending: INTERNAL MEDICINE
Payer: COMMERCIAL

## 2023-05-25 VITALS
SYSTOLIC BLOOD PRESSURE: 127 MMHG | HEIGHT: 67 IN | BODY MASS INDEX: 25.85 KG/M2 | RESPIRATION RATE: 18 BRPM | WEIGHT: 164.69 LBS | DIASTOLIC BLOOD PRESSURE: 76 MMHG | HEART RATE: 65 BPM | OXYGEN SATURATION: 98 %

## 2023-05-25 DIAGNOSIS — I77.89 ASCENDING AORTA ENLARGEMENT: Primary | ICD-10-CM

## 2023-05-25 DIAGNOSIS — R93.1 HIGH CORONARY ARTERY CALCIUM SCORE: ICD-10-CM

## 2023-05-25 DIAGNOSIS — I71.21 ANEURYSM OF ASCENDING AORTA WITHOUT RUPTURE: ICD-10-CM

## 2023-05-25 DIAGNOSIS — I25.10 ASCVD (ARTERIOSCLEROTIC CARDIOVASCULAR DISEASE): ICD-10-CM

## 2023-05-25 DIAGNOSIS — Z13.6 ENCOUNTER FOR SCREENING FOR CARDIOVASCULAR DISORDERS: ICD-10-CM

## 2023-05-25 DIAGNOSIS — I25.10 CORONARY ARTERY DISEASE INVOLVING NATIVE CORONARY ARTERY OF NATIVE HEART WITHOUT ANGINA PECTORIS: Primary | ICD-10-CM

## 2023-05-25 PROCEDURE — 75571 CT HRT W/O DYE W/CA TEST: CPT | Mod: TC

## 2023-05-25 PROCEDURE — 3008F PR BODY MASS INDEX (BMI) DOCUMENTED: ICD-10-PCS | Mod: CPTII,S$GLB,, | Performed by: THORACIC SURGERY (CARDIOTHORACIC VASCULAR SURGERY)

## 2023-05-25 PROCEDURE — 75571 CT CALCIUM SCORING CARDIAC: ICD-10-PCS | Mod: 26,,, | Performed by: RADIOLOGY

## 2023-05-25 PROCEDURE — 99204 PR OFFICE/OUTPT VISIT, NEW, LEVL IV, 45-59 MIN: ICD-10-PCS | Mod: S$GLB,,, | Performed by: THORACIC SURGERY (CARDIOTHORACIC VASCULAR SURGERY)

## 2023-05-25 PROCEDURE — 75571 CT HRT W/O DYE W/CA TEST: CPT | Mod: 26,,, | Performed by: RADIOLOGY

## 2023-05-25 PROCEDURE — 99204 OFFICE O/P NEW MOD 45 MIN: CPT | Mod: S$GLB,,, | Performed by: THORACIC SURGERY (CARDIOTHORACIC VASCULAR SURGERY)

## 2023-05-25 PROCEDURE — 3074F PR MOST RECENT SYSTOLIC BLOOD PRESSURE < 130 MM HG: ICD-10-PCS | Mod: CPTII,S$GLB,, | Performed by: THORACIC SURGERY (CARDIOTHORACIC VASCULAR SURGERY)

## 2023-05-25 PROCEDURE — 3044F HG A1C LEVEL LT 7.0%: CPT | Mod: CPTII,S$GLB,, | Performed by: THORACIC SURGERY (CARDIOTHORACIC VASCULAR SURGERY)

## 2023-05-25 PROCEDURE — 1159F PR MEDICATION LIST DOCUMENTED IN MEDICAL RECORD: ICD-10-PCS | Mod: CPTII,S$GLB,, | Performed by: THORACIC SURGERY (CARDIOTHORACIC VASCULAR SURGERY)

## 2023-05-25 PROCEDURE — 4010F ACE/ARB THERAPY RXD/TAKEN: CPT | Mod: CPTII,S$GLB,, | Performed by: THORACIC SURGERY (CARDIOTHORACIC VASCULAR SURGERY)

## 2023-05-25 PROCEDURE — 3078F PR MOST RECENT DIASTOLIC BLOOD PRESSURE < 80 MM HG: ICD-10-PCS | Mod: CPTII,S$GLB,, | Performed by: THORACIC SURGERY (CARDIOTHORACIC VASCULAR SURGERY)

## 2023-05-25 PROCEDURE — 99999 PR PBB SHADOW E&M-EST. PATIENT-LVL III: ICD-10-PCS | Mod: PBBFAC,,, | Performed by: THORACIC SURGERY (CARDIOTHORACIC VASCULAR SURGERY)

## 2023-05-25 PROCEDURE — 3074F SYST BP LT 130 MM HG: CPT | Mod: CPTII,S$GLB,, | Performed by: THORACIC SURGERY (CARDIOTHORACIC VASCULAR SURGERY)

## 2023-05-25 PROCEDURE — 3078F DIAST BP <80 MM HG: CPT | Mod: CPTII,S$GLB,, | Performed by: THORACIC SURGERY (CARDIOTHORACIC VASCULAR SURGERY)

## 2023-05-25 PROCEDURE — 4010F PR ACE/ARB THEARPY RXD/TAKEN: ICD-10-PCS | Mod: CPTII,S$GLB,, | Performed by: THORACIC SURGERY (CARDIOTHORACIC VASCULAR SURGERY)

## 2023-05-25 PROCEDURE — 99999 PR PBB SHADOW E&M-EST. PATIENT-LVL III: CPT | Mod: PBBFAC,,, | Performed by: THORACIC SURGERY (CARDIOTHORACIC VASCULAR SURGERY)

## 2023-05-25 PROCEDURE — 1159F MED LIST DOCD IN RCRD: CPT | Mod: CPTII,S$GLB,, | Performed by: THORACIC SURGERY (CARDIOTHORACIC VASCULAR SURGERY)

## 2023-05-25 PROCEDURE — 3044F PR MOST RECENT HEMOGLOBIN A1C LEVEL <7.0%: ICD-10-PCS | Mod: CPTII,S$GLB,, | Performed by: THORACIC SURGERY (CARDIOTHORACIC VASCULAR SURGERY)

## 2023-05-25 PROCEDURE — 3008F BODY MASS INDEX DOCD: CPT | Mod: CPTII,S$GLB,, | Performed by: THORACIC SURGERY (CARDIOTHORACIC VASCULAR SURGERY)

## 2023-05-25 NOTE — LETTER
May 26, 2023        Dante Swift MD  [address]  [facility] [address]             [facility] 2nd Fl  [address]  [facility] [address]  Phone: [phone]   Patient: Kip Lauren   MR Number: [id number]   YOB: 1960   Date of Visit: 5/25/2023       Dear Dr. Swift:    Thank you for referring Kip Lauren to me for evaluation. Below are the relevant portions of my assessment and plan of care.            If you have questions, please do not hesitate to call me. I look forward to following Kip along with you.    Sincerely,      Dante Romano MD           CC  No Recipients
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
## 2023-05-26 ENCOUNTER — PATIENT MESSAGE (OUTPATIENT)
Dept: CARDIOLOGY | Facility: CLINIC | Age: 63
End: 2023-05-26
Payer: COMMERCIAL

## 2023-06-07 ENCOUNTER — HOSPITAL ENCOUNTER (OUTPATIENT)
Dept: CARDIOLOGY | Facility: HOSPITAL | Age: 63
Discharge: HOME | End: 2023-06-07
Attending: INTERNAL MEDICINE
Payer: COMMERCIAL

## 2023-06-07 VITALS
HEIGHT: 67 IN | WEIGHT: 164 LBS | HEART RATE: 94 BPM | DIASTOLIC BLOOD PRESSURE: 78 MMHG | RESPIRATION RATE: 16 BRPM | BODY MASS INDEX: 25.74 KG/M2 | SYSTOLIC BLOOD PRESSURE: 161 MMHG

## 2023-06-07 DIAGNOSIS — R93.1 HIGH CORONARY ARTERY CALCIUM SCORE: ICD-10-CM

## 2023-06-07 DIAGNOSIS — I25.10 CORONARY ARTERY DISEASE INVOLVING NATIVE CORONARY ARTERY OF NATIVE HEART WITHOUT ANGINA PECTORIS: ICD-10-CM

## 2023-06-07 LAB
CV STRESS BASE HR: 75 BPM
DIASTOLIC BLOOD PRESSURE: 79 MMHG
EJECTION FRACTION- HIGH: 65 %
END DIASTOLIC INDEX-HIGH: 153 ML/M2
END DIASTOLIC INDEX-LOW: 93 ML/M2
END SYSTOLIC INDEX-HIGH: 71 ML/M2
END SYSTOLIC INDEX-LOW: 31 ML/M2
NUC REST DIASTOLIC VOLUME INDEX: 106
NUC REST EJECTION FRACTION: 63
NUC REST SYSTOLIC VOLUME INDEX: 39
NUC STRESS DIASTOLIC VOLUME INDEX: 77
NUC STRESS EJECTION FRACTION: 66 %
NUC STRESS SYSTOLIC VOLUME INDEX: 26
OHS CV CPX 1 MINUTE RECOVERY HEART RATE: 117 BPM
OHS CV CPX 85 PERCENT MAX PREDICTED HEART RATE MALE: 136
OHS CV CPX ESTIMATED METS: 13
OHS CV CPX MAX PREDICTED HEART RATE: 160
OHS CV CPX PATIENT IS FEMALE: 0
OHS CV CPX PATIENT IS MALE: 1
OHS CV CPX PEAK DIASTOLIC BLOOD PRESSURE: 77 MMHG
OHS CV CPX PEAK HEAR RATE: 136 BPM
OHS CV CPX PEAK RATE PRESSURE PRODUCT: NORMAL
OHS CV CPX PEAK SYSTOLIC BLOOD PRESSURE: 186 MMHG
OHS CV CPX PERCENT MAX PREDICTED HEART RATE ACHIEVED: 85
OHS CV CPX RATE PRESSURE PRODUCT PRESENTING: NORMAL
RETIRED EF AND QEF - SEE NOTES: 53 %
STRESS ECHO POST EXERCISE DUR MIN: 7 MINUTES
STRESS ECHO POST EXERCISE DUR SEC: 37 SECONDS
SYSTOLIC BLOOD PRESSURE: 152 MMHG

## 2023-06-07 PROCEDURE — 93018 CV STRESS TEST I&R ONLY: CPT | Mod: ,,, | Performed by: INTERNAL MEDICINE

## 2023-06-07 PROCEDURE — A9502 TC99M TETROFOSMIN: HCPCS

## 2023-06-07 PROCEDURE — 93018 NUCLEAR STRESS - CARDIOLOGY INTERPRETED (CUPID ONLY): ICD-10-PCS | Mod: ,,, | Performed by: INTERNAL MEDICINE

## 2023-06-07 PROCEDURE — 93017 CV STRESS TEST TRACING ONLY: CPT

## 2023-06-07 PROCEDURE — 78452 HT MUSCLE IMAGE SPECT MULT: CPT | Mod: 26,,, | Performed by: INTERNAL MEDICINE

## 2023-06-07 PROCEDURE — 93016 NUCLEAR STRESS - CARDIOLOGY INTERPRETED (CUPID ONLY): ICD-10-PCS | Mod: ,,, | Performed by: INTERNAL MEDICINE

## 2023-06-07 PROCEDURE — 93016 CV STRESS TEST SUPVJ ONLY: CPT | Mod: ,,, | Performed by: INTERNAL MEDICINE

## 2023-06-07 PROCEDURE — 78452 NUCLEAR STRESS - CARDIOLOGY INTERPRETED (CUPID ONLY): ICD-10-PCS | Mod: 26,,, | Performed by: INTERNAL MEDICINE

## 2023-06-07 NOTE — NURSING NOTE
Pt completed exercise nuclear stress test.  Pt pass target time of 5 min with total of 7 min 37 sec.  Pt reached target  with max HR of 136.  Pt noted SR/ST with PVC.  Pt c/o SOB and leg pain that resolved with rest in recovery.  NAD, VSS, ASG.

## 2023-07-22 ENCOUNTER — TELEPHONE (OUTPATIENT)
Dept: ENDOSCOPY | Facility: HOSPITAL | Age: 63
End: 2023-07-22
Payer: COMMERCIAL

## 2023-08-12 ENCOUNTER — TELEPHONE (OUTPATIENT)
Dept: ENDOSCOPY | Facility: HOSPITAL | Age: 63
End: 2023-08-12
Payer: COMMERCIAL

## 2023-08-12 NOTE — TELEPHONE ENCOUNTER
Contacted the patient to schedule an endoscopy procedure. Number was invalid. Will send letter by mail.

## 2023-11-06 ENCOUNTER — TELEPHONE (OUTPATIENT)
Dept: INTERNAL MEDICINE | Facility: CLINIC | Age: 63
End: 2023-11-06

## 2023-11-06 ENCOUNTER — PATIENT MESSAGE (OUTPATIENT)
Dept: INTERNAL MEDICINE | Facility: CLINIC | Age: 63
End: 2023-11-06
Payer: COMMERCIAL

## 2023-11-06 DIAGNOSIS — L98.9 SKIN LESION: Primary | ICD-10-CM

## 2023-11-07 NOTE — TELEPHONE ENCOUNTER
Please see referral orders and please call patient to schedule a consult with dermatology. Thank you.

## 2023-11-07 NOTE — TELEPHONE ENCOUNTER
----- Message from Romulo Rees MD sent at 11/3/2023  4:50 PM CDT -----  Contact: [phone]    ----- Message -----  From: Kati Anna  Sent: 11/3/2023   3:21 PM CDT  To: [facility] Staff    Pt is requesting this through the portal please advise    Preferred Date Range: 11/3/2023 - 11/30/2023    Preferred Times: Any Time    Reason for visit: wart removal on left forearm    Comments:  remove wart i need to see doctor jhonny horne

## 2024-03-18 ENCOUNTER — OFFICE VISIT (OUTPATIENT)
Dept: INTERNAL MEDICINE | Facility: CLINIC | Age: 64
End: 2024-03-18
Attending: FAMILY MEDICINE
Payer: COMMERCIAL

## 2024-03-18 ENCOUNTER — LAB VISIT (OUTPATIENT)
Dept: LAB | Facility: HOSPITAL | Age: 64
End: 2024-03-18
Attending: FAMILY MEDICINE
Payer: COMMERCIAL

## 2024-03-18 ENCOUNTER — TELEPHONE (OUTPATIENT)
Dept: INTERNAL MEDICINE | Facility: CLINIC | Age: 64
End: 2024-03-18

## 2024-03-18 VITALS
HEIGHT: 66 IN | OXYGEN SATURATION: 99 % | HEART RATE: 65 BPM | BODY MASS INDEX: 27.6 KG/M2 | WEIGHT: 171.75 LBS | DIASTOLIC BLOOD PRESSURE: 64 MMHG | SYSTOLIC BLOOD PRESSURE: 124 MMHG

## 2024-03-18 DIAGNOSIS — J43.2 CENTRILOBULAR EMPHYSEMA: ICD-10-CM

## 2024-03-18 DIAGNOSIS — M20.61 TOE DEFORMITY, ACQUIRED, RIGHT: ICD-10-CM

## 2024-03-18 DIAGNOSIS — I10 HYPERTENSION, ESSENTIAL: ICD-10-CM

## 2024-03-18 DIAGNOSIS — I25.10 ASCVD (ARTERIOSCLEROTIC CARDIOVASCULAR DISEASE): ICD-10-CM

## 2024-03-18 DIAGNOSIS — I77.89 ASCENDING AORTA ENLARGEMENT: ICD-10-CM

## 2024-03-18 DIAGNOSIS — Z12.5 PROSTATE CANCER SCREENING: ICD-10-CM

## 2024-03-18 DIAGNOSIS — Z87.891 PERSONAL HISTORY OF NICOTINE DEPENDENCE: ICD-10-CM

## 2024-03-18 DIAGNOSIS — E78.5 HYPERLIPIDEMIA, UNSPECIFIED HYPERLIPIDEMIA TYPE: ICD-10-CM

## 2024-03-18 DIAGNOSIS — K63.5 POLYP OF COLON, UNSPECIFIED PART OF COLON, UNSPECIFIED TYPE: ICD-10-CM

## 2024-03-18 DIAGNOSIS — Z89.421 ACQUIRED ABSENCE OF OTHER RIGHT TOE(S): ICD-10-CM

## 2024-03-18 DIAGNOSIS — E78.00 PURE HYPERCHOLESTEROLEMIA: ICD-10-CM

## 2024-03-18 DIAGNOSIS — Z12.11 COLON CANCER SCREENING: ICD-10-CM

## 2024-03-18 DIAGNOSIS — E78.5 HYPERLIPIDEMIA, UNSPECIFIED HYPERLIPIDEMIA TYPE: Primary | ICD-10-CM

## 2024-03-18 DIAGNOSIS — Z00.00 ANNUAL PHYSICAL EXAM: Primary | ICD-10-CM

## 2024-03-18 DIAGNOSIS — Z00.00 ANNUAL PHYSICAL EXAM: ICD-10-CM

## 2024-03-18 DIAGNOSIS — Z98.890 HISTORY OF RADIOFREQUENCY ABLATION (RFA) PROCEDURE FOR CARDIAC ARRHYTHMIA: ICD-10-CM

## 2024-03-18 LAB
ALBUMIN SERPL BCP-MCNC: 4.3 G/DL (ref 3.5–5.2)
ALP SERPL-CCNC: 73 U/L (ref 55–135)
ALT SERPL W/O P-5'-P-CCNC: 52 U/L (ref 10–44)
ANION GAP SERPL CALC-SCNC: 11 MMOL/L (ref 8–16)
AST SERPL-CCNC: 34 U/L (ref 10–40)
BILIRUB SERPL-MCNC: 1.6 MG/DL (ref 0.1–1)
BUN SERPL-MCNC: 14 MG/DL (ref 8–23)
CALCIUM SERPL-MCNC: 9.9 MG/DL (ref 8.7–10.5)
CHLORIDE SERPL-SCNC: 101 MMOL/L (ref 95–110)
CHOLEST SERPL-MCNC: 170 MG/DL (ref 120–199)
CHOLEST/HDLC SERPL: 2.3 {RATIO} (ref 2–5)
CO2 SERPL-SCNC: 28 MMOL/L (ref 23–29)
COMPLEXED PSA SERPL-MCNC: 3.1 NG/ML (ref 0–4)
CREAT SERPL-MCNC: 0.7 MG/DL (ref 0.5–1.4)
EST. GFR  (NO RACE VARIABLE): >60 ML/MIN/1.73 M^2
GLUCOSE SERPL-MCNC: 88 MG/DL (ref 70–110)
HDLC SERPL-MCNC: 75 MG/DL (ref 40–75)
HDLC SERPL: 44.1 % (ref 20–50)
LDLC SERPL CALC-MCNC: 84.8 MG/DL (ref 63–159)
NONHDLC SERPL-MCNC: 95 MG/DL
POTASSIUM SERPL-SCNC: 4.5 MMOL/L (ref 3.5–5.1)
PROT SERPL-MCNC: 7.1 G/DL (ref 6–8.4)
SODIUM SERPL-SCNC: 140 MMOL/L (ref 136–145)
TRIGL SERPL-MCNC: 51 MG/DL (ref 30–150)

## 2024-03-18 PROCEDURE — 4010F ACE/ARB THERAPY RXD/TAKEN: CPT | Mod: CPTII,S$GLB,, | Performed by: FAMILY MEDICINE

## 2024-03-18 PROCEDURE — 36415 COLL VENOUS BLD VENIPUNCTURE: CPT | Performed by: FAMILY MEDICINE

## 2024-03-18 PROCEDURE — 84153 ASSAY OF PSA TOTAL: CPT | Performed by: FAMILY MEDICINE

## 2024-03-18 PROCEDURE — 1160F RVW MEDS BY RX/DR IN RCRD: CPT | Mod: CPTII,S$GLB,, | Performed by: FAMILY MEDICINE

## 2024-03-18 PROCEDURE — 1159F MED LIST DOCD IN RCRD: CPT | Mod: CPTII,S$GLB,, | Performed by: FAMILY MEDICINE

## 2024-03-18 PROCEDURE — 80053 COMPREHEN METABOLIC PANEL: CPT | Performed by: FAMILY MEDICINE

## 2024-03-18 PROCEDURE — 99396 PREV VISIT EST AGE 40-64: CPT | Mod: S$GLB,,, | Performed by: FAMILY MEDICINE

## 2024-03-18 PROCEDURE — 3074F SYST BP LT 130 MM HG: CPT | Mod: CPTII,S$GLB,, | Performed by: FAMILY MEDICINE

## 2024-03-18 PROCEDURE — 80061 LIPID PANEL: CPT | Performed by: FAMILY MEDICINE

## 2024-03-18 PROCEDURE — 99999 PR PBB SHADOW E&M-EST. PATIENT-LVL V: CPT | Mod: PBBFAC,,, | Performed by: FAMILY MEDICINE

## 2024-03-18 PROCEDURE — 3008F BODY MASS INDEX DOCD: CPT | Mod: CPTII,S$GLB,, | Performed by: FAMILY MEDICINE

## 2024-03-18 PROCEDURE — 3078F DIAST BP <80 MM HG: CPT | Mod: CPTII,S$GLB,, | Performed by: FAMILY MEDICINE

## 2024-03-18 RX ORDER — AMLODIPINE BESYLATE 10 MG/1
10 TABLET ORAL DAILY
Qty: 90 TABLET | Refills: 3 | Status: SHIPPED | OUTPATIENT
Start: 2024-03-18

## 2024-03-18 RX ORDER — LOSARTAN POTASSIUM 100 MG/1
100 TABLET ORAL DAILY
Qty: 90 TABLET | Refills: 3 | Status: SHIPPED | OUTPATIENT
Start: 2024-03-18

## 2024-03-18 RX ORDER — ATORVASTATIN CALCIUM 40 MG/1
40 TABLET, FILM COATED ORAL DAILY
Qty: 90 TABLET | Refills: 3 | Status: SHIPPED | OUTPATIENT
Start: 2024-03-18

## 2024-03-18 RX ORDER — HYDROCHLOROTHIAZIDE 25 MG/1
25 TABLET ORAL DAILY
Qty: 90 TABLET | Refills: 3 | Status: SHIPPED | OUTPATIENT
Start: 2024-03-18

## 2024-03-18 NOTE — PATIENT INSTRUCTIONS
Schedule lab orders for today.      If not contacted in a couple weeks by colonoscopy scheduling department - call Colonoscopy Scheduling Number - [phone].

## 2024-03-18 NOTE — PROGRESS NOTES
Subjective:       Patient ID: Kip Lauren is a 63 y.o. male.    Chief Complaint: Annual Exam    Established patient for an annual wellness check/physical exam and also chronic disease management. Specific complaints - see dictation, M*model entries and please see ROS.  Past, Surgical, Family, Social Histories; Medications, Allergies reviewed and reconciled.  Health maintenance file reviewed and addressed items due. Recent applicable lab, imaging and cardiovascular results reviewed.  Problem list items reviewed and modified or added entries (in the overview section) may not be transcribed into this encounter note due to note writer format.      Saw Dr. Swift and Rosalina in May. Notes reviewed.        Review of Systems   Constitutional:  Negative for appetite change, chills, diaphoresis, fatigue and fever.   HENT:  Negative for congestion, postnasal drip, rhinorrhea, sore throat and trouble swallowing.    Eyes:  Negative for visual disturbance.   Respiratory:  Negative for cough, choking, chest tightness, shortness of breath and wheezing.    Cardiovascular:  Negative for chest pain and leg swelling.   Gastrointestinal:  Negative for abdominal distention, abdominal pain, diarrhea, nausea and vomiting.   Genitourinary:  Negative for difficulty urinating and hematuria.   Musculoskeletal:  Negative for arthralgias and myalgias.   Skin:  Negative for rash.   Neurological:  Negative for weakness, light-headedness and headaches.   Psychiatric/Behavioral:  Negative for confusion and dysphoric mood.        Objective:      Physical Exam  Vitals and nursing note reviewed.   Constitutional:       Appearance: He is well-developed. He is not diaphoretic.   Eyes:      General: No scleral icterus.  Neck:      Thyroid: No thyromegaly.      Vascular: No carotid bruit or JVD.      Trachea: No tracheal deviation.   Cardiovascular:      Rate and Rhythm: Normal rate and regular rhythm.      Heart sounds: Normal heart sounds. No  murmur heard.     No friction rub. No gallop.   Pulmonary:      Effort: Pulmonary effort is normal. No respiratory distress.      Breath sounds: Normal breath sounds. No wheezing or rales.   Abdominal:      General: There is no distension.      Palpations: Abdomen is soft. There is no mass.      Tenderness: There is no abdominal tenderness. There is no guarding or rebound.   Musculoskeletal:      Cervical back: Normal range of motion and neck supple.   Lymphadenopathy:      Cervical: No cervical adenopathy.   Skin:     General: Skin is warm and dry.      Findings: No erythema or rash.   Neurological:      Mental Status: He is alert and oriented to person, place, and time.      Cranial Nerves: No cranial nerve deficit.      Motor: No tremor.      Coordination: Coordination normal.      Gait: Gait normal.   Psychiatric:         Behavior: Behavior normal.         Thought Content: Thought content normal.         Judgment: Judgment normal.         Assessment:       1. Annual physical exam    2. Centrilobular emphysema    3. Ascending aorta enlargement    4. Acquired absence of other right toe(s)    5. Toe deformity, acquired, right    6. Personal history of nicotine dependence    7. Hypertension, essential    8. Pure hypercholesterolemia    9. History of radiofrequency ablation (RFA) procedure for cardiac arrhythmia    10. Polyp of colon, unspecified part of colon, unspecified type    11. Colon cancer screening    12. Prostate cancer screening    13. ASCVD (arteriosclerotic cardiovascular disease)    14. Hyperlipidemia, unspecified hyperlipidemia type        Plan:     Medication List with Changes/Refills   Current Medications    AMMONIUM LACTATE 12 % CREA    Apply twice daily to affected parts both feet as needed.    ASPIRIN (ECOTRIN) 81 MG EC TABLET    Take 81 mg by mouth once daily.    DICLOFENAC (VOLTAREN) 75 MG EC TABLET    Take 1 tablet (75 mg total) by mouth 2 (two) times daily as needed (pain or arthritis).     ECONAZOLE NITRATE 1 % CREAM    Apply topically 2 (two) times daily. 2-4 weeks   Changed and/or Refilled Medications    Modified Medication Previous Medication    AMLODIPINE (NORVASC) 10 MG TABLET amLODIPine (NORVASC) 10 MG tablet       Take 1 tablet (10 mg total) by mouth once daily.    Take 1 tablet (10 mg total) by mouth once daily.    ATORVASTATIN (LIPITOR) 40 MG TABLET atorvastatin (LIPITOR) 20 MG tablet       Take 1 tablet (40 mg total) by mouth once daily.    Take 1 tablet (20 mg total) by mouth once daily.    HYDROCHLOROTHIAZIDE (HYDRODIURIL) 25 MG TABLET hydroCHLOROthiazide (HYDRODIURIL) 25 MG tablet       Take 1 tablet (25 mg total) by mouth once daily.    Take 1 tablet (25 mg total) by mouth once daily.    LOSARTAN (COZAAR) 100 MG TABLET losartan (COZAAR) 100 MG tablet       Take 1 tablet (100 mg total) by mouth once daily.    Take 1 tablet (100 mg total) by mouth once daily.   Discontinued Medications    ACETAMINOPHEN (TYLENOL) 500 MG TABLET    Take 2 tablets (1,000 mg total) by mouth every 8 (eight) hours.     1. Annual physical exam  -     Comprehensive Metabolic Panel; Future; Expected date: 03/18/2024  -     Lipid Panel; Future; Expected date: 03/18/2024  -     PSA, Screening; Future; Expected date: 03/18/2024    2. Centrilobular emphysema  Overview:  Noted on LD CT (ref 4/19/2022)      3. Ascending aorta enlargement  Overview:  -aortic root and ascending aorta mild, echo 8/10/2022 and prior chest CT scans  -followed by cardiology and CTS    Orders:  -     Ambulatory referral/consult to Cardiology; Future; Expected date: 03/25/2024    4. 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 Personal history of nicotine dependence  Overview:  -since age 13, still active  -Not interested in smoking  cessation    Orders:  -     CT Chest Lung Screening Low Dose; Future; Expected date: 03/18/2024    7. Hypertension, essential  -     Comprehensive Metabolic Panel; Future; Expected date: 03/18/2024  -     amLODIPine (NORVASC) 10 MG tablet; Take 1 tablet (10 mg total) by mouth once daily.  Dispense: 90 tablet; Refill: 3  -     hydroCHLOROthiazide (HYDRODIURIL) 25 MG tablet; Take 1 tablet (25 mg total) by mouth once daily.  Dispense: 90 tablet; Refill: 3  -     losartan (COZAAR) 100 MG tablet; Take 1 tablet (100 mg total) by mouth once daily.  Dispense: 90 tablet; Refill: 3    8. Pure hypercholesterolemia  Assessment & Plan:  Incr lipitor 20 to 40    Orders:  -     Lipid Panel; Future; Expected date: 03/18/2024    9. History of radiofrequency ablation (RFA) procedure for cardiac arrhythmia  Overview:  -circa 5814-1358 in [place]. (?? Accessory pathway ?SVT)      10. Polyp of colon, unspecified part of colon, unspecified type  -     Ambulatory referral/consult to Endo Procedure ; Future; Expected date: 03/19/2024    11. Colon cancer screening  -     Ambulatory referral/consult to Endo Procedure ; Future; Expected date: 03/19/2024    12. Prostate cancer screening  -     PSA, Screening; Future; Expected date: 03/18/2024    13. ASCVD (arteriosclerotic cardiovascular disease)  Overview:  -Coronary and aortic calcifications noted on LD CT, multiple, on statin, followed by cardiology  -CAC 2333 5/25/2023    Assessment & Plan:  -Dr. Swift 2/25/23, neg nuc stress 6/7/2023      14. Hyperlipidemia, unspecified hyperlipidemia type  Assessment & Plan:  Incr lipitor 20 to 40    Orders:  -     atorvastatin (LIPITOR) 40 MG tablet; Take 1 tablet (40 mg total) by mouth once daily.  Dispense: 90 tablet; Refill: 3      See meds, orders, follow up, routing and instructions sections of encounter and AVS. Discussed with patient and provided on AVS.    Discussed diet and exercise as therapeutic modalities for metabolic and  other conditions. Provided patient information, which are included as links on the AVS for detailed information.    Lab Results   Component Value Date     03/16/2023    K 4.0 03/16/2023     03/16/2023    BUN 10 03/16/2023    CREATININE 0.8 03/16/2023     03/16/2023    HGBA1C 4.8 03/16/2023    AST 22 03/16/2023    ALT 30 03/16/2023    ALBUMIN 4.1 03/16/2023    PROT 7.2 03/16/2023    BILITOT 1.5 (H) 03/16/2023    CHOL 163 03/16/2023    HDL 79 (H) 03/16/2023    LDLCALC 75.8 03/16/2023    TRIG 41 03/16/2023    WBC 6.95 03/08/2022    HGB 15.0 03/08/2022    HCT 44.8 03/08/2022     03/08/2022    PSA 3.3 03/16/2023    PSADIAG 2.1 10/22/2021    TSH 1.174 11/08/2017

## 2024-03-19 NOTE — TELEPHONE ENCOUNTER
Please call patient and explain that tests show labs are good..will repeat cholesterol in 3 months on new med dose.    I recommend follow up testing. Please see orders for Lipids, ALT, and AST and please schedule in 3 months.     Thank you.

## 2024-03-25 ENCOUNTER — TELEPHONE (OUTPATIENT)
Dept: ENDOSCOPY | Facility: HOSPITAL | Age: 64
End: 2024-03-25

## 2024-03-25 ENCOUNTER — CLINICAL SUPPORT (OUTPATIENT)
Dept: ENDOSCOPY | Facility: HOSPITAL | Age: 64
End: 2024-03-25
Attending: FAMILY MEDICINE
Payer: COMMERCIAL

## 2024-03-25 DIAGNOSIS — Z12.11 COLON CANCER SCREENING: ICD-10-CM

## 2024-03-25 DIAGNOSIS — K63.5 POLYP OF COLON, UNSPECIFIED PART OF COLON, UNSPECIFIED TYPE: ICD-10-CM

## 2024-03-25 NOTE — TELEPHONE ENCOUNTER
Spoke to pt to schedule procedure(s) Colonoscopy       Physician to perform procedure(s) Dr. MEGAN Curry  Date of Procedure (s) 5/16/24  Arrival Time 8:30 AM  Time of Procedure(s) 9:30 AM   Location of Procedure(s) [facility]  Type of Rx Prep sent to patient: PEG  Instructions provided to patient via [facility]    Patient was informed on the following information and verbalized understanding. Screening questionnaire reviewed with patient and complete. If procedure requires anesthesia, a responsible adult needs to be present to accompany the patient home, patient cannot drive after receiving anesthesia. Appointment details are tentative, especially check-in time. Patient will receive a prep-op call 7 days prior to confirm check-in time for procedure. If applicable the patient should contact their pharmacy to verify Rx for procedure prep is ready for pick-up. Patient was advised to call the scheduling department at [phone] if pharmacy states no Rx is available. Patient was advised to call the endoscopy scheduling department if any questions or concerns arise.      [facility] Scheduling Department

## 2024-03-27 ENCOUNTER — HOSPITAL ENCOUNTER (OUTPATIENT)
Dept: RADIOLOGY | Facility: HOSPITAL | Age: 64
Discharge: HOME | End: 2024-03-27
Attending: FAMILY MEDICINE
Payer: COMMERCIAL

## 2024-03-27 ENCOUNTER — TELEPHONE (OUTPATIENT)
Dept: INTERNAL MEDICINE | Facility: CLINIC | Age: 64
End: 2024-03-27
Payer: COMMERCIAL

## 2024-03-27 DIAGNOSIS — Z87.891 PERSONAL HISTORY OF NICOTINE DEPENDENCE: Primary | ICD-10-CM

## 2024-03-27 DIAGNOSIS — Z87.891 PERSONAL HISTORY OF NICOTINE DEPENDENCE: ICD-10-CM

## 2024-03-27 PROCEDURE — 71271 CT THORAX LUNG CANCER SCR C-: CPT | Mod: 26,,, | Performed by: STUDENT IN AN ORGANIZED HEALTH CARE EDUCATION/TRAINING PROGRAM

## 2024-03-27 PROCEDURE — 71271 CT THORAX LUNG CANCER SCR C-: CPT | Mod: TC

## 2024-04-10 ENCOUNTER — TELEPHONE (OUTPATIENT)
Dept: ENDOSCOPY | Facility: HOSPITAL | Age: 64
End: 2024-04-10
Payer: COMMERCIAL

## 2024-04-10 NOTE — PLAN OF CARE
Contacted pt to reschedule colonoscopy due to physician booking out that day. Patient did not answer either number listed on chart. Direct line and department number left for pt to call back and get rescheduled to new day.

## 2024-04-17 ENCOUNTER — TELEPHONE (OUTPATIENT)
Dept: ENDOSCOPY | Facility: HOSPITAL | Age: 64
End: 2024-04-17
Payer: COMMERCIAL

## 2024-04-17 NOTE — TELEPHONE ENCOUNTER
Contacted pt to reschedule procedure since Physician booked out. Patient did not answer, left voicemail for pt stating he would be removed from schedule and he can call back to schedule. Dept number left. Also sent portal msg.

## 2024-05-06 ENCOUNTER — TELEPHONE (OUTPATIENT)
Dept: CARDIOTHORACIC SURGERY | Facility: CLINIC | Age: 64
End: 2024-05-06
Payer: COMMERCIAL

## 2024-05-06 NOTE — TELEPHONE ENCOUNTER
Called pt and left brief VM to call back to set up follow up apt with Dr. Romano.   Contact no was provided to call back.

## 2024-05-07 ENCOUNTER — TELEPHONE (OUTPATIENT)
Dept: CARDIOTHORACIC SURGERY | Facility: CLINIC | Age: 64
End: 2024-05-07
Payer: COMMERCIAL

## 2024-05-10 ENCOUNTER — TELEPHONE (OUTPATIENT)
Dept: INTERNAL MEDICINE | Facility: CLINIC | Age: 64
End: 2024-05-10
Payer: COMMERCIAL

## 2024-05-10 NOTE — TELEPHONE ENCOUNTER
Reported that his knee is bothering him considerably.  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  No abrupt injury but noted after some period of time knee hurting.  Especially noted when he was sitting on a bucket and went to stand up.  Was unable to fully straighten.  Denied fever, chills, redness, warmth or definite swelling.  Significantly improved and flex position, worse with weight-bearing or extension. Using etoh for pain relief.    Precautions for weekend, redness warmth swelling or inability to walk, go to emergency room.  May take ibuprofen or Aleve.  We will see him 1st thing Monday morning.

## 2024-05-13 ENCOUNTER — TELEPHONE (OUTPATIENT)
Dept: CARDIOTHORACIC SURGERY | Facility: CLINIC | Age: 64
End: 2024-05-13
Payer: COMMERCIAL

## 2024-05-13 NOTE — TELEPHONE ENCOUNTER
Spoke with pt regarding set up apt with Dr. Romano.    As pt requested scheduled him on May 28th but if pt wants to re scheduled apt he will call back to reschedule.   For now apt time and date is confirmed.  Pt verbalized understand.   Apt letter sent out to  pt home address provided in profile.

## 2024-06-10 ENCOUNTER — PATIENT MESSAGE (OUTPATIENT)
Dept: INTERNAL MEDICINE | Facility: CLINIC | Age: 64
End: 2024-06-10
Payer: COMMERCIAL

## 2024-06-21 ENCOUNTER — LAB VISIT (OUTPATIENT)
Dept: LAB | Facility: HOSPITAL | Age: 64
End: 2024-06-21
Attending: FAMILY MEDICINE
Payer: COMMERCIAL

## 2024-06-21 DIAGNOSIS — E78.5 HYPERLIPIDEMIA, UNSPECIFIED HYPERLIPIDEMIA TYPE: ICD-10-CM

## 2024-06-21 LAB
ALT SERPL W/O P-5'-P-CCNC: 40 U/L (ref 10–44)
AST SERPL-CCNC: 28 U/L (ref 10–40)
CHOLEST SERPL-MCNC: 141 MG/DL (ref 120–199)
CHOLEST/HDLC SERPL: 2.2 {RATIO} (ref 2–5)
HDLC SERPL-MCNC: 65 MG/DL (ref 40–75)
HDLC SERPL: 46.1 % (ref 20–50)
LDLC SERPL CALC-MCNC: 66.4 MG/DL (ref 63–159)
NONHDLC SERPL-MCNC: 76 MG/DL
TRIGL SERPL-MCNC: 48 MG/DL (ref 30–150)

## 2024-06-21 PROCEDURE — 84450 TRANSFERASE (AST) (SGOT): CPT | Performed by: FAMILY MEDICINE

## 2024-06-21 PROCEDURE — 80061 LIPID PANEL: CPT | Performed by: FAMILY MEDICINE

## 2024-06-21 PROCEDURE — 84460 ALANINE AMINO (ALT) (SGPT): CPT | Performed by: FAMILY MEDICINE

## 2024-06-21 PROCEDURE — 36415 COLL VENOUS BLD VENIPUNCTURE: CPT | Performed by: FAMILY MEDICINE

## 2024-07-08 ENCOUNTER — TELEPHONE (OUTPATIENT)
Dept: CARDIOTHORACIC SURGERY | Facility: CLINIC | Age: 64
End: 2024-07-08
Payer: COMMERCIAL

## 2024-07-15 ENCOUNTER — TELEPHONE (OUTPATIENT)
Dept: CARDIOTHORACIC SURGERY | Facility: CLINIC | Age: 64
End: 2024-07-15
Payer: COMMERCIAL

## 2024-07-22 ENCOUNTER — TELEPHONE (OUTPATIENT)
Dept: CARDIOTHORACIC SURGERY | Facility: CLINIC | Age: 64
End: 2024-07-22
Payer: COMMERCIAL

## 2024-08-13 ENCOUNTER — PATIENT MESSAGE (OUTPATIENT)
Dept: CARDIOTHORACIC SURGERY | Facility: CLINIC | Age: 64
End: 2024-08-13
Payer: COMMERCIAL

## 2025-03-24 ENCOUNTER — LAB VISIT (OUTPATIENT)
Dept: LAB | Facility: HOSPITAL | Age: 65
End: 2025-03-24
Attending: FAMILY MEDICINE
Payer: COMMERCIAL

## 2025-03-24 ENCOUNTER — OFFICE VISIT (OUTPATIENT)
Dept: INTERNAL MEDICINE | Facility: CLINIC | Age: 65
End: 2025-03-24
Attending: FAMILY MEDICINE
Payer: COMMERCIAL

## 2025-03-24 VITALS
BODY MASS INDEX: 27.34 KG/M2 | DIASTOLIC BLOOD PRESSURE: 68 MMHG | HEART RATE: 98 BPM | HEIGHT: 67 IN | OXYGEN SATURATION: 98 % | SYSTOLIC BLOOD PRESSURE: 128 MMHG | WEIGHT: 174.19 LBS

## 2025-03-24 DIAGNOSIS — E78.5 HYPERLIPIDEMIA, UNSPECIFIED HYPERLIPIDEMIA TYPE: ICD-10-CM

## 2025-03-24 DIAGNOSIS — Z12.11 COLON CANCER SCREENING: ICD-10-CM

## 2025-03-24 DIAGNOSIS — I10 HYPERTENSION, ESSENTIAL: ICD-10-CM

## 2025-03-24 DIAGNOSIS — I77.89 ASCENDING AORTA ENLARGEMENT: ICD-10-CM

## 2025-03-24 DIAGNOSIS — E78.00 PURE HYPERCHOLESTEROLEMIA: ICD-10-CM

## 2025-03-24 DIAGNOSIS — I25.10 ASCVD (ARTERIOSCLEROTIC CARDIOVASCULAR DISEASE): ICD-10-CM

## 2025-03-24 DIAGNOSIS — Z00.00 ANNUAL PHYSICAL EXAM: Primary | ICD-10-CM

## 2025-03-24 DIAGNOSIS — Z12.5 PROSTATE CANCER SCREENING: ICD-10-CM

## 2025-03-24 DIAGNOSIS — J43.2 CENTRILOBULAR EMPHYSEMA: ICD-10-CM

## 2025-03-24 DIAGNOSIS — Z00.00 ANNUAL PHYSICAL EXAM: ICD-10-CM

## 2025-03-24 DIAGNOSIS — Z87.891 PERSONAL HISTORY OF NICOTINE DEPENDENCE: ICD-10-CM

## 2025-03-24 LAB
ALBUMIN SERPL BCP-MCNC: 4.2 G/DL (ref 3.5–5.2)
ALP SERPL-CCNC: 77 UNIT/L (ref 40–150)
ALT SERPL W/O P-5'-P-CCNC: 42 UNIT/L (ref 10–44)
ANION GAP (OHS): 11 MMOL/L (ref 8–16)
AST SERPL-CCNC: 35 UNIT/L (ref 11–45)
BILIRUB SERPL-MCNC: 1.7 MG/DL (ref 0.1–1)
BUN SERPL-MCNC: 15 MG/DL (ref 8–23)
CALCIUM SERPL-MCNC: 9.5 MG/DL (ref 8.7–10.5)
CHLORIDE SERPL-SCNC: 105 MMOL/L (ref 95–110)
CHOLEST SERPL-MCNC: 167 MG/DL (ref 120–199)
CHOLEST/HDLC SERPL: 2.1 {RATIO} (ref 2–5)
CO2 SERPL-SCNC: 24 MMOL/L (ref 23–29)
CREAT SERPL-MCNC: 0.7 MG/DL (ref 0.5–1.4)
GFR SERPLBLD CREATININE-BSD FMLA CKD-EPI: >60 ML/MIN/1.73/M2
GLUCOSE SERPL-MCNC: 93 MG/DL (ref 70–110)
HDLC SERPL-MCNC: 78 MG/DL (ref 40–75)
HDLC SERPL: 46.7 % (ref 20–50)
LDLC SERPL CALC-MCNC: 78 MG/DL (ref 63–159)
NONHDLC SERPL-MCNC: 89 MG/DL
POTASSIUM SERPL-SCNC: 3.9 MMOL/L (ref 3.5–5.1)
PROT SERPL-MCNC: 7.1 GM/DL (ref 6–8.4)
PSA SERPL-MCNC: 3.52 NG/ML
SODIUM SERPL-SCNC: 140 MMOL/L (ref 136–145)
TRIGL SERPL-MCNC: 55 MG/DL (ref 30–150)

## 2025-03-24 PROCEDURE — 3008F BODY MASS INDEX DOCD: CPT | Mod: CPTII,S$GLB,, | Performed by: FAMILY MEDICINE

## 2025-03-24 PROCEDURE — 1159F MED LIST DOCD IN RCRD: CPT | Mod: CPTII,S$GLB,, | Performed by: FAMILY MEDICINE

## 2025-03-24 PROCEDURE — 36415 COLL VENOUS BLD VENIPUNCTURE: CPT

## 2025-03-24 PROCEDURE — 99396 PREV VISIT EST AGE 40-64: CPT | Mod: S$GLB,,, | Performed by: FAMILY MEDICINE

## 2025-03-24 PROCEDURE — 3078F DIAST BP <80 MM HG: CPT | Mod: CPTII,S$GLB,, | Performed by: FAMILY MEDICINE

## 2025-03-24 PROCEDURE — 99999 PR PBB SHADOW E&M-EST. PATIENT-LVL V: CPT | Mod: PBBFAC,,, | Performed by: FAMILY MEDICINE

## 2025-03-24 PROCEDURE — 80053 COMPREHEN METABOLIC PANEL: CPT

## 2025-03-24 PROCEDURE — 1160F RVW MEDS BY RX/DR IN RCRD: CPT | Mod: CPTII,S$GLB,, | Performed by: FAMILY MEDICINE

## 2025-03-24 PROCEDURE — 4010F ACE/ARB THERAPY RXD/TAKEN: CPT | Mod: CPTII,S$GLB,, | Performed by: FAMILY MEDICINE

## 2025-03-24 PROCEDURE — 84153 ASSAY OF PSA TOTAL: CPT

## 2025-03-24 PROCEDURE — 82465 ASSAY BLD/SERUM CHOLESTEROL: CPT

## 2025-03-24 PROCEDURE — 3074F SYST BP LT 130 MM HG: CPT | Mod: CPTII,S$GLB,, | Performed by: FAMILY MEDICINE

## 2025-03-24 RX ORDER — LOSARTAN POTASSIUM 100 MG/1
100 TABLET ORAL DAILY
Qty: 90 TABLET | Refills: 3 | Status: SHIPPED | OUTPATIENT
Start: 2025-03-24

## 2025-03-24 RX ORDER — AMLODIPINE BESYLATE 10 MG/1
10 TABLET ORAL DAILY
Qty: 90 TABLET | Refills: 3 | Status: SHIPPED | OUTPATIENT
Start: 2025-03-24

## 2025-03-24 RX ORDER — HYDROCHLOROTHIAZIDE 25 MG/1
25 TABLET ORAL DAILY
Qty: 90 TABLET | Refills: 3 | Status: SHIPPED | OUTPATIENT
Start: 2025-03-24

## 2025-03-24 RX ORDER — ATORVASTATIN CALCIUM 40 MG/1
40 TABLET, FILM COATED ORAL DAILY
Qty: 90 TABLET | Refills: 3 | Status: SHIPPED | OUTPATIENT
Start: 2025-03-24

## 2025-03-24 NOTE — PATIENT INSTRUCTIONS
Schedule lab orders for today.      If not contacted in a couple weeks by colonoscopy scheduling department - call Colonoscopy Scheduling Number - [phone].     [facility]:  [phone]

## 2025-03-25 ENCOUNTER — TELEPHONE (OUTPATIENT)
Dept: ENDOSCOPY | Facility: HOSPITAL | Age: 65
End: 2025-03-25

## 2025-03-25 ENCOUNTER — HOSPITAL ENCOUNTER (OUTPATIENT)
Dept: RADIOLOGY | Facility: HOSPITAL | Age: 65
Discharge: HOME | End: 2025-03-25
Attending: FAMILY MEDICINE
Payer: COMMERCIAL

## 2025-03-25 ENCOUNTER — CLINICAL SUPPORT (OUTPATIENT)
Dept: ENDOSCOPY | Facility: HOSPITAL | Age: 65
End: 2025-03-25
Attending: FAMILY MEDICINE
Payer: COMMERCIAL

## 2025-03-25 DIAGNOSIS — Z12.11 COLON CANCER SCREENING: ICD-10-CM

## 2025-03-25 DIAGNOSIS — Z87.891 PERSONAL HISTORY OF NICOTINE DEPENDENCE: ICD-10-CM

## 2025-03-25 DIAGNOSIS — Z12.11 ENCOUNTER FOR SCREENING COLONOSCOPY: Primary | ICD-10-CM

## 2025-03-25 PROCEDURE — 71271 CT THORAX LUNG CANCER SCR C-: CPT | Mod: 26,,, | Performed by: RADIOLOGY

## 2025-03-25 PROCEDURE — 71271 CT THORAX LUNG CANCER SCR C-: CPT | Mod: TC

## 2025-03-25 NOTE — TELEPHONE ENCOUNTER
Referral for procedure from PAT appointment        Spoke to patient to schedule procedure(s) Colonoscopy       Physician to perform procedure(s) Dr. CINDY Sandhu  Date of Procedure (s) 05/22/2025   Arrival Time 9:00 Am   Time of Procedure(s) 10:00 Am    Location of Procedure(s) [facility]  Type of Rx Prep sent to patient: PEG  Instructions provided to patient via [facility]     Patient was informed on the following information and verbalized understanding. Screening questionnaire reviewed with patient and complete. If procedure requires anesthesia, a responsible adult needs to be present to accompany the patient home, patient cannot drive after receiving anesthesia. Appointment details are tentative, especially check-in time. Patient will receive a prep-op call 7 days prior to confirm check-in time for procedure. If applicable the patient should contact their pharmacy to verify Rx for procedure prep is ready for pick-up. Patient was advised to call the scheduling department at [phone] if pharmacy states no Rx is available. Patient was advised to call the endoscopy scheduling department if any questions or concerns arise.

## 2025-03-25 NOTE — PLAN OF CARE
Referral for procedure from PAT appointment      Spoke to patient to schedule procedure(s) Colonoscopy       Physician to perform procedure(s) Dr. CINDY Sandhu  Date of Procedure (s) 05/22/2025   Arrival Time 9:00 Am   Time of Procedure(s) 10:00 Am    Location of Procedure(s) [place] [facility]  Type of Rx Prep sent to patient: PEG  Instructions provided to patient via [facility]    Patient was informed on the following information and verbalized understanding. Screening questionnaire reviewed with patient and complete. If procedure requires anesthesia, a responsible adult needs to be present to accompany the patient home, patient cannot drive after receiving anesthesia. Appointment details are tentative, especially check-in time. Patient will receive a prep-op call 7 days prior to confirm check-in time for procedure. If applicable the patient should contact their pharmacy to verify Rx for procedure prep is ready for pick-up. Patient was advised to call the scheduling department at [phone] if pharmacy states no Rx is available. Patient was advised to call the endoscopy scheduling department if any questions or concerns arise.      [facility] Scheduling Department

## 2025-03-27 ENCOUNTER — TELEPHONE (OUTPATIENT)
Dept: CARDIOTHORACIC SURGERY | Facility: CLINIC | Age: 65
End: 2025-03-27
Payer: COMMERCIAL

## 2025-03-27 NOTE — TELEPHONE ENCOUNTER
Called pt to schedule f/u with Dr. Romano; no answer. LVM with reason for call and requested call back. Contact number provided.

## 2025-03-28 ENCOUNTER — TELEPHONE (OUTPATIENT)
Dept: CARDIOTHORACIC SURGERY | Facility: CLINIC | Age: 65
End: 2025-03-28
Payer: COMMERCIAL

## 2025-03-28 NOTE — TELEPHONE ENCOUNTER
2nd attempt calling pt to schedule consult; no answer. LVM with reason for call and requested call back. Contact number provided.

## 2025-03-31 ENCOUNTER — TELEPHONE (OUTPATIENT)
Dept: CARDIOTHORACIC SURGERY | Facility: CLINIC | Age: 65
End: 2025-03-31
Payer: COMMERCIAL

## 2025-03-31 NOTE — TELEPHONE ENCOUNTER
3rd attempt at contacting pt regarding referral from Dr. Rees; no answer. LVM with reason for call and requested call back. Will send [employer] message and defer it 30 days before canceling referral.

## 2025-04-08 ENCOUNTER — TELEPHONE (OUTPATIENT)
Dept: CARDIOTHORACIC SURGERY | Facility: CLINIC | Age: 65
End: 2025-04-08
Payer: COMMERCIAL

## 2025-04-08 NOTE — TELEPHONE ENCOUNTER
Returned pt's call and scheduled f/u with Dr. Romano on 5/27 which pt agreed to. Pt verbalized understanding of appt date, time, and location. Pt denies any questions at this time.

## 2025-05-13 ENCOUNTER — RESULTS FOLLOW-UP (OUTPATIENT)
Dept: INTERNAL MEDICINE | Facility: CLINIC | Age: 65
End: 2025-05-13

## 2025-05-13 ENCOUNTER — TELEPHONE (OUTPATIENT)
Dept: INTERNAL MEDICINE | Facility: CLINIC | Age: 65
End: 2025-05-13
Payer: COMMERCIAL

## 2025-05-13 DIAGNOSIS — Z87.891 PERSONAL HISTORY OF NICOTINE DEPENDENCE: Primary | ICD-10-CM

## 2025-05-13 DIAGNOSIS — I25.10 ASCVD (ARTERIOSCLEROTIC CARDIOVASCULAR DISEASE): ICD-10-CM

## 2025-05-13 DIAGNOSIS — I77.89 ASCENDING AORTA ENLARGEMENT: ICD-10-CM

## 2025-05-13 DIAGNOSIS — E78.5 HYPERLIPIDEMIA, UNSPECIFIED HYPERLIPIDEMIA TYPE: Primary | ICD-10-CM

## 2025-05-13 RX ORDER — ATORVASTATIN CALCIUM 80 MG/1
80 TABLET, FILM COATED ORAL DAILY
Qty: 90 TABLET | Refills: 3 | Status: SHIPPED | OUTPATIENT
Start: 2025-05-13

## 2025-05-13 NOTE — TELEPHONE ENCOUNTER
Called to schedule yearly lunch CT and patient stated that he was coming to see Dr Rees in a few weeks and he would schedule it during that appt

## 2025-05-13 NOTE — TELEPHONE ENCOUNTER
----- Message from Lisa sent at 5/13/2025 11:56 AM CDT -----  Contact: [phone]  Type: Returning a callWho left a message?Lizbet Urias LPNWhen did the practice call?5/13/25Does patient know what this is regarding:returning the call Would the patient rather a call back or a response via [facility]?callbackComments:

## 2025-05-13 NOTE — TELEPHONE ENCOUNTER
Please call patient and explain that the test(s) show labs are stable, we would like to see the LDL cholesterol below 70. I'll increase the lipitor dose to 80 mg and call in to pharmacy.    Concerning coronary artery disease and enlargement of aorta, I still recommend Cardiothoracic referral and Cardiology referral.    I would like to refer to the CTS and Cardiology department for further evaluation and treatment.    Please see referral orders and please call patient to schedule.     Thank you.

## 2025-05-13 NOTE — TELEPHONE ENCOUNTER
Attempted to call pt back about change in medication but no response at this time. Left VM to call office when available.

## 2025-05-22 ENCOUNTER — ANESTHESIA EVENT (OUTPATIENT)
Dept: ENDOSCOPY | Facility: HOSPITAL | Age: 65
End: 2025-05-22
Payer: COMMERCIAL

## 2025-05-22 ENCOUNTER — RESULTS FOLLOW-UP (OUTPATIENT)
Dept: INTERNAL MEDICINE | Facility: CLINIC | Age: 65
End: 2025-05-22

## 2025-05-22 ENCOUNTER — ANESTHESIA (OUTPATIENT)
Dept: ENDOSCOPY | Facility: HOSPITAL | Age: 65
End: 2025-05-22
Payer: COMMERCIAL

## 2025-05-22 ENCOUNTER — HOSPITAL ENCOUNTER (OUTPATIENT)
Facility: HOSPITAL | Age: 65
Discharge: HOME | End: 2025-05-22
Attending: COLON & RECTAL SURGERY | Admitting: COLON & RECTAL SURGERY
Payer: COMMERCIAL

## 2025-05-22 VITALS
RESPIRATION RATE: 18 BRPM | DIASTOLIC BLOOD PRESSURE: 81 MMHG | HEIGHT: 66 IN | BODY MASS INDEX: 27.14 KG/M2 | TEMPERATURE: 98 F | WEIGHT: 168.88 LBS | HEART RATE: 79 BPM | OXYGEN SATURATION: 97 % | SYSTOLIC BLOOD PRESSURE: 140 MMHG

## 2025-05-22 DIAGNOSIS — Z86.0100 HISTORY OF COLON POLYPS: Primary | ICD-10-CM

## 2025-05-22 DIAGNOSIS — Z12.11 COLON CANCER SCREENING: ICD-10-CM

## 2025-05-22 PROCEDURE — 88305 TISSUE EXAM BY PATHOLOGIST: CPT | Mod: TC,91 | Performed by: COLON & RECTAL SURGERY

## 2025-05-22 PROCEDURE — 37000008 HC ANESTHESIA 1ST 15 MINUTES: Performed by: COLON & RECTAL SURGERY

## 2025-05-22 PROCEDURE — 27201012 HC FORCEPS, HOT/COLD, DISP: Performed by: COLON & RECTAL SURGERY

## 2025-05-22 PROCEDURE — 45385 COLONOSCOPY W/LESION REMOVAL: CPT | Mod: 33,,, | Performed by: COLON & RECTAL SURGERY

## 2025-05-22 PROCEDURE — 25000003 PHARM REV CODE 250: Performed by: COLON & RECTAL SURGERY

## 2025-05-22 PROCEDURE — 45380 COLONOSCOPY AND BIOPSY: CPT | Mod: 33,XS,, | Performed by: COLON & RECTAL SURGERY

## 2025-05-22 PROCEDURE — 27201089 HC SNARE, DISP (ANY): Performed by: COLON & RECTAL SURGERY

## 2025-05-22 PROCEDURE — 37000009 HC ANESTHESIA EA ADD 15 MINS: Performed by: COLON & RECTAL SURGERY

## 2025-05-22 PROCEDURE — 45385 COLONOSCOPY W/LESION REMOVAL: CPT | Mod: 33 | Performed by: COLON & RECTAL SURGERY

## 2025-05-22 PROCEDURE — 63600175 PHARM REV CODE 636 W HCPCS: Performed by: NURSE ANESTHETIST, CERTIFIED REGISTERED

## 2025-05-22 PROCEDURE — 45380 COLONOSCOPY AND BIOPSY: CPT | Mod: 33,XS | Performed by: COLON & RECTAL SURGERY

## 2025-05-22 RX ORDER — PROPOFOL 10 MG/ML
VIAL (ML) INTRAVENOUS CONTINUOUS PRN
Status: DISCONTINUED | OUTPATIENT
Start: 2025-05-22 | End: 2025-05-22

## 2025-05-22 RX ORDER — SODIUM CHLORIDE 9 MG/ML
INJECTION, SOLUTION INTRAVENOUS CONTINUOUS
Status: DISCONTINUED | OUTPATIENT
Start: 2025-05-22 | End: 2025-05-22 | Stop reason: HOSPADM

## 2025-05-22 RX ORDER — LIDOCAINE HYDROCHLORIDE 20 MG/ML
INJECTION INTRAVENOUS
Status: DISCONTINUED | OUTPATIENT
Start: 2025-05-22 | End: 2025-05-22

## 2025-05-22 RX ORDER — PROPOFOL 10 MG/ML
VIAL (ML) INTRAVENOUS
Status: DISCONTINUED | OUTPATIENT
Start: 2025-05-22 | End: 2025-05-22

## 2025-05-22 RX ADMIN — PROPOFOL 100 MG: 10 INJECTION, EMULSION INTRAVENOUS at 09:05

## 2025-05-22 RX ADMIN — PROPOFOL 150 MCG/KG/MIN: 10 INJECTION, EMULSION INTRAVENOUS at 09:05

## 2025-05-22 RX ADMIN — LIDOCAINE HYDROCHLORIDE 100 MG: 20 INJECTION INTRAVENOUS at 09:05

## 2025-05-22 RX ADMIN — SODIUM CHLORIDE: 0.9 INJECTION, SOLUTION INTRAVENOUS at 09:05

## 2025-05-22 NOTE — TRANSFER OF CARE
"Anesthesia Transfer of Care Note    Patient: Kip Lauren    Procedure(s) Performed: Procedure(s) (LRB):  COLONOSCOPY, SCREENING, LOW RISK PATIENT (N/A)    Patient location: PACU    Anesthesia Type: general    Transport from OR: Transported from OR on 2-3 L/min O2 by NC with adequate spontaneous ventilation    Post pain: adequate analgesia    Post assessment: no apparent anesthetic complications    Post vital signs: stable    Level of consciousness: sedated    Nausea/Vomiting: no nausea/vomiting    Complications: none    Transfer of care protocol was followed      Last vitals: Visit Vitals  BP (!) 154/76   Pulse 69   Temp 36.9 °C (98.4 °F) (Temporal)   Resp 16   Ht 5' 6" (1.676 m)   Wt 76.6 kg (168 lb 14 oz)   SpO2 96%   BMI 27.26 kg/m²     "
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
## 2025-05-22 NOTE — PLAN OF CARE
Patient admitted for endoscopy procedure today.  Reviewed plan of care verbalized understanding.
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
## 2025-05-22 NOTE — ANESTHESIA POSTPROCEDURE EVALUATION
Anesthesia Post Evaluation    Patient: Kip Lauren    Procedure(s) Performed: Procedure(s) (LRB):  COLONOSCOPY, SCREENING, LOW RISK PATIENT (N/A)    Final Anesthesia Type: general      Patient location during evaluation: GI PACU  Patient participation: Yes- Able to Participate  Level of consciousness: awake and alert  Post-procedure vital signs: reviewed and stable  Pain management: adequate  Airway patency: patent    PONV status at discharge: No PONV  Anesthetic complications: no      Cardiovascular status: blood pressure returned to baseline  Respiratory status: unassisted  Hydration status: euvolemic  Follow-up not needed.              Vitals Value Taken Time   /81 05/22/25 10:46   Temp 36.4 °C (97.5 °F) 05/22/25 10:15   Pulse 79 05/22/25 10:46   Resp 18 05/22/25 10:46   SpO2 97 % 05/22/25 10:46         Event Time   Out of Recovery 11:03:22         Pain/Florina Score: Florina Score: 9 (5/22/2025 10:17 AM)

## 2025-05-23 LAB
ESTROGEN SERPL-MCNC: NORMAL PG/ML
INSULIN SERPL-ACNC: NORMAL U[IU]/ML
LAB AP CLINICAL INFORMATION: NORMAL
LAB AP GROSS DESCRIPTION: NORMAL
LAB AP PERFORMING LOCATION(S): NORMAL
LAB AP REPORT FOOTNOTES: NORMAL

## 2025-05-26 ENCOUNTER — TELEPHONE (OUTPATIENT)
Dept: INTERNAL MEDICINE | Facility: CLINIC | Age: 65
End: 2025-05-26
Payer: COMMERCIAL

## 2025-05-27 ENCOUNTER — HOSPITAL ENCOUNTER (OUTPATIENT)
Dept: RADIOLOGY | Facility: HOSPITAL | Age: 65
Discharge: HOME | End: 2025-05-27
Attending: FAMILY MEDICINE
Payer: COMMERCIAL

## 2025-05-27 ENCOUNTER — OFFICE VISIT (OUTPATIENT)
Dept: CARDIOTHORACIC SURGERY | Facility: CLINIC | Age: 65
End: 2025-05-27
Payer: COMMERCIAL

## 2025-05-27 ENCOUNTER — OFFICE VISIT (OUTPATIENT)
Dept: INTERNAL MEDICINE | Facility: CLINIC | Age: 65
End: 2025-05-27
Attending: FAMILY MEDICINE
Payer: COMMERCIAL

## 2025-05-27 VITALS
BODY MASS INDEX: 28.04 KG/M2 | SYSTOLIC BLOOD PRESSURE: 144 MMHG | HEIGHT: 66 IN | OXYGEN SATURATION: 99 % | WEIGHT: 174.5 LBS | DIASTOLIC BLOOD PRESSURE: 78 MMHG | HEART RATE: 66 BPM

## 2025-05-27 VITALS
HEIGHT: 66 IN | DIASTOLIC BLOOD PRESSURE: 64 MMHG | HEART RATE: 77 BPM | BODY MASS INDEX: 28.21 KG/M2 | OXYGEN SATURATION: 98 % | SYSTOLIC BLOOD PRESSURE: 120 MMHG | WEIGHT: 175.5 LBS

## 2025-05-27 DIAGNOSIS — I10 HYPERTENSION, ESSENTIAL: ICD-10-CM

## 2025-05-27 DIAGNOSIS — M25.559 HIP PAIN, UNSPECIFIED LATERALITY: ICD-10-CM

## 2025-05-27 DIAGNOSIS — I77.89 ASCENDING AORTA ENLARGEMENT: ICD-10-CM

## 2025-05-27 DIAGNOSIS — M53.3 SI (SACROILIAC) PAIN: Primary | ICD-10-CM

## 2025-05-27 DIAGNOSIS — E78.5 HYPERLIPIDEMIA, UNSPECIFIED HYPERLIPIDEMIA TYPE: ICD-10-CM

## 2025-05-27 DIAGNOSIS — I25.10 ASCVD (ARTERIOSCLEROTIC CARDIOVASCULAR DISEASE): ICD-10-CM

## 2025-05-27 DIAGNOSIS — M53.3 SI (SACROILIAC) PAIN: ICD-10-CM

## 2025-05-27 DIAGNOSIS — K63.5 POLYP OF COLON, UNSPECIFIED PART OF COLON, UNSPECIFIED TYPE: ICD-10-CM

## 2025-05-27 PROCEDURE — 4010F ACE/ARB THERAPY RXD/TAKEN: CPT | Mod: CPTII,S$GLB,, | Performed by: THORACIC SURGERY (CARDIOTHORACIC VASCULAR SURGERY)

## 2025-05-27 PROCEDURE — 99214 OFFICE O/P EST MOD 30 MIN: CPT | Mod: S$GLB,,, | Performed by: FAMILY MEDICINE

## 2025-05-27 PROCEDURE — 3008F BODY MASS INDEX DOCD: CPT | Mod: CPTII,S$GLB,, | Performed by: THORACIC SURGERY (CARDIOTHORACIC VASCULAR SURGERY)

## 2025-05-27 PROCEDURE — 99213 OFFICE O/P EST LOW 20 MIN: CPT | Mod: S$GLB,,, | Performed by: THORACIC SURGERY (CARDIOTHORACIC VASCULAR SURGERY)

## 2025-05-27 PROCEDURE — 73521 X-RAY EXAM HIPS BI 2 VIEWS: CPT | Mod: TC

## 2025-05-27 PROCEDURE — 99999 PR PBB SHADOW E&M-EST. PATIENT-LVL V: CPT | Mod: PBBFAC,,, | Performed by: FAMILY MEDICINE

## 2025-05-27 PROCEDURE — 72202 X-RAY EXAM SI JOINTS 3/> VWS: CPT | Mod: TC

## 2025-05-27 PROCEDURE — 3078F DIAST BP <80 MM HG: CPT | Mod: CPTII,S$GLB,, | Performed by: THORACIC SURGERY (CARDIOTHORACIC VASCULAR SURGERY)

## 2025-05-27 PROCEDURE — 3078F DIAST BP <80 MM HG: CPT | Mod: CPTII,S$GLB,, | Performed by: FAMILY MEDICINE

## 2025-05-27 PROCEDURE — 99999 PR PBB SHADOW E&M-EST. PATIENT-LVL IV: CPT | Mod: PBBFAC,,, | Performed by: THORACIC SURGERY (CARDIOTHORACIC VASCULAR SURGERY)

## 2025-05-27 PROCEDURE — 3077F SYST BP >= 140 MM HG: CPT | Mod: CPTII,S$GLB,, | Performed by: THORACIC SURGERY (CARDIOTHORACIC VASCULAR SURGERY)

## 2025-05-27 PROCEDURE — 3008F BODY MASS INDEX DOCD: CPT | Mod: CPTII,S$GLB,, | Performed by: FAMILY MEDICINE

## 2025-05-27 PROCEDURE — 4010F ACE/ARB THERAPY RXD/TAKEN: CPT | Mod: CPTII,S$GLB,, | Performed by: FAMILY MEDICINE

## 2025-05-27 PROCEDURE — 3074F SYST BP LT 130 MM HG: CPT | Mod: CPTII,S$GLB,, | Performed by: FAMILY MEDICINE

## 2025-05-27 PROCEDURE — 1159F MED LIST DOCD IN RCRD: CPT | Mod: CPTII,S$GLB,, | Performed by: THORACIC SURGERY (CARDIOTHORACIC VASCULAR SURGERY)

## 2025-05-27 PROCEDURE — G2211 COMPLEX E/M VISIT ADD ON: HCPCS | Mod: S$GLB,,, | Performed by: FAMILY MEDICINE

## 2025-05-27 NOTE — PROGRESS NOTES
Subjective:      Patient ID: Kip Lauren is a 64 y.o. male.    Chief Complaint: No chief complaint on file.      HPI:  Kip Lauren is a 64 y.o. male who presents to surgical reevaluation for aortic root dilation. Medical conditions include emphysema, HTN, HLD, BPH, current smoker since age 13 and not interested in quitting. Also drinks 6-8 beers daily since around 14 years old. Patient has known aortic root dilation and mild TAA on previous CT scans done for lung cancer surveillance. Last evaluated by Dr. Romano in 2023, TAA was 4.1 cm. He had no AI on echo. Here to reestablish care.     CT 3/2025, TAA  was ~4.1 cm. He remains asymptomatic from cardiac standpoint. Report compliance to Bp medication and good control despite not measuring it daily. BP in clinic 144/78. No family of aneurysm. Father pass away from heart attack.         Current medications Reviewed  Medications Ordered Prior to Encounter[1]    Review of Systems   Constitutional:  Negative for activity change, appetite change, fatigue and fever.   HENT:  Negative for nosebleeds.    Respiratory:  Negative for cough and shortness of breath.    Cardiovascular:  Negative for chest pain, palpitations and leg swelling.   Gastrointestinal:  Negative for abdominal distention, abdominal pain and nausea.   Genitourinary:  Negative for frequency.   Musculoskeletal:  Negative for arthralgias and myalgias.   Skin:  Negative for rash.   Neurological:  Negative for dizziness and numbness.   Hematological:  Does not bruise/bleed easily.     Objective:   Physical Exam  Constitutional:       Appearance: Normal appearance.   HENT:      Head: Normocephalic and atraumatic.   Eyes:      Extraocular Movements: Extraocular movements intact.   Cardiovascular:      Rate and Rhythm: Normal rate.   Pulmonary:      Effort: Pulmonary effort is normal.   Abdominal:      General: Abdomen is flat.   Musculoskeletal:         General: Normal range of motion.       Cervical back: Normal range of motion.   Skin:     General: Skin is warm and dry.      Capillary Refill: Capillary refill takes less than 2 seconds.   Neurological:      General: No focal deficit present.      Mental Status: He is alert.         Diagnotic Results: reviewed   CT 3/25/25    CT 3/24/23  Aneurysmal dilatation of the aortic root, up to 4.1 cm at the sinuses of Valsalva; previously, it was up to 3.7 cm.  The liver appears fatty     CT 4/19/22  Aorta and vasculature: Moderate bilateral coronary arterial calcific disease.  Mild-moderate calcific disease of the aorta.  Aortic Lue it upper limits of normal size, 3.7 cm.     TTE 8/10/22  The left ventricle is normal in size with normal systolic function. The estimated ejection fraction is 65%.  Normal right ventricular size with normal right ventricular systolic function.  Normal left ventricular diastolic function.  Mild biatrial enlargement.  The estimated PA systolic pressure is 30 mmHg.  Normal central venous pressure (3 mmHg).  The sinuses of Valsalva is mildly dilated.  The ascending aorta is mildly dilated.  Assessment:   TAA 4.2 cm   Plan:     CTS Attending Note:    I have personally taken the history and examined this patient and agree with the CARMENCITA's note as stated above.  64-year-old male with long history of smoking who is followed for lung cancer screening.  Those CT scans demonstrated a dilated ascending aorta.  I reviewed his most recent scan from March 25th of this year.  His ascending aorta remains stable in size at roughly 4.1 cm by my measurement.  We discussed the importance of good blood pressure control.  I will plan to see him back in 1 year.  At that time, I will again review his most recent screening CT scan.       [1]   Current Outpatient Medications on File Prior to Visit   Medication Sig Dispense Refill    amLODIPine (NORVASC) 10 MG tablet Take 1 tablet (10 mg total) by mouth once daily. 90 tablet 3    aspirin (ECOTRIN) 81 MG EC  tablet Take 81 mg by mouth once daily.      atorvastatin (LIPITOR) 80 MG tablet Take 1 tablet (80 mg total) by mouth once daily. 90 tablet 3    hydroCHLOROthiazide (HYDRODIURIL) 25 MG tablet Take 1 tablet (25 mg total) by mouth once daily. 90 tablet 3    losartan (COZAAR) 100 MG tablet Take 1 tablet (100 mg total) by mouth once daily. 90 tablet 3     No current facility-administered medications on file prior to visit.

## 2025-05-27 NOTE — PROGRESS NOTES
Subjective:       Patient ID: Kip Lauren is a 64 y.o. male.    Chief Complaint: Hip Pain    Established patient follows up for management of chronic medical illnesses with complaints today. Please see dictation and ROS for interval problems, specific complaints and disease management discussion.    Past, Surgical, Family, Social, Histories; Medications, allergies reviewed and reconciled.  Health maintenance file reviewed and addressed items due. Recent applicable lab, imaging and cardiovascular results reviewed.  Problem list items reviewed and modified or added entries (in the overview section) may not be transcribed into this encounter note due to note writer format.      Pain in the right SI area occurring over the past year which is worsening over time and recently.  Rather focal.  Occurs with walking.  Will resolve somewhat with a short rest.  Notice that when he hunts and goes up an incline is 6 significant worse.  Would hurt at 10 or 20 steps or so, slight reduction with rest but recurs when walking is resumed.    Located to the right SI area.  Not buttock.  No distal pain radiation.  No central lower lumbar pain.  No definite groin pain.  No claudication symptoms in the lower extremities.    Cardiothoracic surgery appointment pending today.  Colonoscopy noted, 3 year follow-up.        Review of Systems   Constitutional:  Negative for activity change and unexpected weight change.   HENT:  Negative for hearing loss, rhinorrhea and trouble swallowing.    Eyes:  Negative for discharge and visual disturbance.   Respiratory:  Negative for chest tightness and wheezing.    Cardiovascular:  Negative for chest pain and palpitations.   Gastrointestinal:  Negative for blood in stool, constipation, diarrhea and vomiting.   Endocrine: Negative for polydipsia and polyuria.   Genitourinary:  Negative for difficulty urinating, hematuria and urgency.   Musculoskeletal:  Positive for arthralgias and gait problem.  Negative for joint swelling and neck pain.   Neurological:  Negative for weakness and headaches.   Psychiatric/Behavioral:  Negative for confusion and dysphoric mood.        Objective:      Physical Exam  Vitals and nursing note reviewed.   Constitutional:       General: He is not in acute distress.     Appearance: He is well-developed.   Cardiovascular:      Pulses:           Dorsalis pedis pulses are 2+ on the right side and 2+ on the left side.   Pulmonary:      Effort: Pulmonary effort is normal.   Musculoskeletal:      Cervical back: Neck supple.      Right hip: Tenderness present. Decreased range of motion.      Right lower leg: No edema.      Left lower leg: No edema.        Legs:       Comments: Pos RANI, right, which is notable right hip decreased range of motion.  This pain tends to hurt at the lateral aspect, trochanter area.    Flexion of hip is maintained.    Left hip is slightly reduced motion that is nontender.   Skin:     General: Skin is warm and dry.      Findings: No rash.   Neurological:      Mental Status: He is alert and oriented to person, place, and time.      GCS: GCS eye subscore is 4. GCS verbal subscore is 5. GCS motor subscore is 6.      Motor: Motor function is intact.      Deep Tendon Reflexes:      Reflex Scores:       Patellar reflexes are 2+ on the right side and 2+ on the left side.       Achilles reflexes are 2+ on the right side and 2+ on the left side.  Psychiatric:         Behavior: Behavior normal.         Thought Content: Thought content normal.         Judgment: Judgment normal.         Assessment:       1. SI (sacroiliac) pain    2. Hip pain, unspecified laterality    3. ASCVD (arteriosclerotic cardiovascular disease)    4. Ascending aorta enlargement    5. Hypertension, essential    6. Hyperlipidemia, unspecified hyperlipidemia type    7. Polyp of colon, unspecified part of colon, unspecified type        Plan:     Medication List with Changes/Refills   Current Medications     AMLODIPINE (NORVASC) 10 MG TABLET    Take 1 tablet (10 mg total) by mouth once daily.    ASPIRIN (ECOTRIN) 81 MG EC TABLET    Take 81 mg by mouth once daily.    ATORVASTATIN (LIPITOR) 80 MG TABLET    Take 1 tablet (80 mg total) by mouth once daily.    HYDROCHLOROTHIAZIDE (HYDRODIURIL) 25 MG TABLET    Take 1 tablet (25 mg total) by mouth once daily.    LOSARTAN (COZAAR) 100 MG TABLET    Take 1 tablet (100 mg total) by mouth once daily.     1. SI (sacroiliac) pain  -     X-Ray Hips Bilateral 2 [employer] AP Pelvis; Future; Expected date: 05/27/2025  -     X-Ray Sacroiliac Joints Complete; Future; Expected date: 05/27/2025  -     Ambulatory referral/consult to Pain Clinic; Future; Expected date: 06/03/2025    2. Hip pain, unspecified laterality  -     X-Ray Hips Bilateral 2 [employer] AP Pelvis; Future; Expected date: 05/27/2025  -     X-Ray Sacroiliac Joints Complete; Future; Expected date: 05/27/2025  -     Ambulatory referral/consult to Pain Clinic; Future; Expected date: 06/03/2025    3. ASCVD (arteriosclerotic cardiovascular disease)  Overview:  -Coronary and aortic calcifications noted on LD CT, multiple, on statin, followed by cardiology  -CAC 2333 5/25/2023    Orders:  -     Ambulatory referral/consult to Cardiology; Future; Expected date: 06/03/2025    4. Ascending aorta enlargement  Overview:  -aortic root and ascending aorta mild, echo 8/10/2022 (and other) and prior chest CT scans  -followed by cardiology and CTS    Orders:  -     Ambulatory referral/consult to Cardiology; Future; Expected date: 06/03/2025    5. Hypertension, essential  Assessment & Plan:  Under good control      6. Hyperlipidemia, unspecified hyperlipidemia type  Assessment & Plan:  No side effects listed from recent medication adjustment      7. Polyp of colon, unspecified part of colon, unspecified type  Assessment & Plan:  Recent colonoscopy done, 3 year follow-up recommendation noted        See meds, orders, follow up, routing and  instructions sections of encounter and AVS. Discussed with patient and provided on AVS.    Lab Results   Component Value Date     03/24/2025     03/18/2024    K 3.9 03/24/2025    K 4.5 03/18/2024     03/24/2025     03/18/2024    BUN 15 03/24/2025    CREATININE 0.7 03/24/2025    GLU 93 03/24/2025    GLU 88 03/18/2024    HGBA1C 4.8 03/16/2023    AST 35 03/24/2025    AST 28 06/21/2024    ALT 42 03/24/2025    ALT 40 06/21/2024    ALBUMIN 4.2 03/24/2025    ALBUMIN 4.3 03/18/2024    PROT 7.1 03/24/2025    PROT 7.1 03/18/2024    BILITOT 1.7 (H) 03/24/2025    BILITOT 1.6 (H) 03/18/2024    CHOL 167 03/24/2025    CHOL 141 06/21/2024    HDL 78 (H) 03/24/2025    LDLCALC 78.0 03/24/2025    TRIG 55 03/24/2025    TRIG 48 06/21/2024    WBC 6.95 03/08/2022    HGB 15.0 03/08/2022    HCT 44.8 03/08/2022     03/08/2022    PSA 3.52 03/24/2025    PSA 3.1 03/18/2024    PSADIAG 2.1 10/22/2021    TSH 1.174 11/08/2017         As this patient's primary care physician, I am actively managing and/or treating his/her chronic medical conditions now and in the future. This includes, but is not limited to, medication management, coordination of care, documentation review from his/her specialists, and labs/imaging review that I have performed to prepare for this visit as well as will do so in the future as part of my care continuity for this patient.

## 2025-06-01 ENCOUNTER — TELEPHONE (OUTPATIENT)
Dept: INTERNAL MEDICINE | Facility: CLINIC | Age: 65
End: 2025-06-01
Payer: COMMERCIAL

## 2025-06-01 ENCOUNTER — RESULTS FOLLOW-UP (OUTPATIENT)
Dept: INTERNAL MEDICINE | Facility: CLINIC | Age: 65
End: 2025-06-01

## 2025-06-01 DIAGNOSIS — I77.9 PAOD (PERIPHERAL ARTERIAL OCCLUSIVE DISEASE): Primary | ICD-10-CM

## 2025-06-11 ENCOUNTER — OFFICE VISIT (OUTPATIENT)
Dept: CARDIOLOGY | Facility: CLINIC | Age: 65
End: 2025-06-11
Payer: COMMERCIAL

## 2025-06-11 ENCOUNTER — TELEPHONE (OUTPATIENT)
Dept: INTERNAL MEDICINE | Facility: CLINIC | Age: 65
End: 2025-06-11
Payer: COMMERCIAL

## 2025-06-11 VITALS
WEIGHT: 173.75 LBS | HEART RATE: 79 BPM | SYSTOLIC BLOOD PRESSURE: 153 MMHG | DIASTOLIC BLOOD PRESSURE: 81 MMHG | BODY MASS INDEX: 28.04 KG/M2

## 2025-06-11 DIAGNOSIS — E78.5 HYPERLIPIDEMIA, UNSPECIFIED HYPERLIPIDEMIA TYPE: ICD-10-CM

## 2025-06-11 DIAGNOSIS — Z87.891 PERSONAL HISTORY OF NICOTINE DEPENDENCE: ICD-10-CM

## 2025-06-11 DIAGNOSIS — I25.10 ASCVD (ARTERIOSCLEROTIC CARDIOVASCULAR DISEASE): Primary | ICD-10-CM

## 2025-06-11 DIAGNOSIS — I77.89 ASCENDING AORTA ENLARGEMENT: ICD-10-CM

## 2025-06-11 DIAGNOSIS — I10 HYPERTENSION, ESSENTIAL: Primary | ICD-10-CM

## 2025-06-11 DIAGNOSIS — I10 HYPERTENSION, ESSENTIAL: ICD-10-CM

## 2025-06-11 DIAGNOSIS — Z98.890 HISTORY OF RADIOFREQUENCY ABLATION (RFA) PROCEDURE FOR CARDIAC ARRHYTHMIA: ICD-10-CM

## 2025-06-11 DIAGNOSIS — I77.9 PAOD (PERIPHERAL ARTERIAL OCCLUSIVE DISEASE): ICD-10-CM

## 2025-06-11 DIAGNOSIS — E78.2 MIXED HYPERLIPIDEMIA: ICD-10-CM

## 2025-06-11 LAB
OHS QRS DURATION: 90 MS
OHS QTC CALCULATION: 462 MS

## 2025-06-11 PROCEDURE — 99999 PR PBB SHADOW E&M-EST. PATIENT-LVL III: CPT | Mod: PBBFAC,,, | Performed by: INTERNAL MEDICINE

## 2025-06-11 RX ORDER — EZETIMIBE 10 MG/1
10 TABLET ORAL DAILY
Qty: 90 TABLET | Refills: 3 | Status: SHIPPED | OUTPATIENT
Start: 2025-06-11

## 2025-06-11 RX ORDER — NEBIVOLOL 5 MG/1
5 TABLET ORAL DAILY
Qty: 90 TABLET | Refills: 3 | Status: SHIPPED | OUTPATIENT
Start: 2025-06-11

## 2025-06-11 NOTE — PROGRESS NOTES
Patient ID: Kip Lauren is a 64 y.o. male.    History of Present Illness      CHIEF COMPLAINT:  - Patient presents for a general cardiovascular evaluation and to discuss peripheral arterial disease, as referred by Dr. Rees.    HPI:    Patient reports having calcification of the coronary arteries, specifically mentioning atherosclerosis and calcium. He has seen Dr. Mansfield, a cardiothoracic surgeon, regarding his ascending aorta, but was informed it does not require intervention at this time.    He reports a history of two cardiac procedures in his 30s while living in [place]. One of these procedures was an ablation for likely SVT, where his heart rate was elevated.    Recently, he had a some dizziness at work that lasted for 3-4 hours, which has not recurred since.  Was mild.  No associated palpitations.    He reports ongoing issues with his legs. His left leg was severely injured, and his right leg had a compound tib-fib fracture with 7 broken bones and an ankle injury years ago, leaving lasting effects. He also mentions that arthritis is significantly affecting his right hip.  Does not have any typical claudication.    He denies any current chest pain, dyspnea with activity, or limitations in his daily activities. He denies using inhalers for his emphysema/COPD.    MEDICAL HISTORY:  - CAD on CT chest  - SVT status post ablation years ago  - ascending aortic ectasia versus mild dilation  - HTN  - HLD  - Emphysema/COPD  - osteoarthritis    SURGICAL HISTORY:  - Compound tib-fib fracture repair: 7 broken bones, ankle injury    MEDICATIONS:  - Aspirin 81 mg daily  - Amlodipine 10 mg daily for HTN  - HCTZ 25 mg daily for HTN  - Losartan 100 mg daily for HTN  - Atorvastatin 80 mg daily for HLD    FAMILY HISTORY:  - Father:  of massive MI age 57    SOCIAL HISTORY:  - Smoking: Current smoker  - Occupation:  - installs, services, and fixes generators         Physical Exam    Vitals:  Pulse: 79. Blood pressure: 153/81.  Cardiovascular: Regular rate and rhythm. S1, S2 present. No murmurs. No JVD. No bruits. Palpable varicose veins.  Extremities: No lower extremity edema.    LABS:  - CBC: 2022, normal  - CMP: March 2025, normal  - Lipid panel: March 2025, total cholesterol 167, HDL 78, LDL 78, triglycerides 55  - A1C: 2023, normal  TEST RESULTS  (IMAGING REVIEWED DURING  CLINIC VISIT):  - EKG ([id number]:00:00.000Z): SR, no Q waves or ST changes  - Echo 08/2022: normal LV size and function, EF 65%, CVP 3  - CT chest: normal size heart, coronary calcification  - Nuclear stress test 2023: normal LVF, no evidence of ischemia or scar  - Coronary calcium score: 2023, Agatson score of 2,333         Assessment & Plan    I25.10 ASCVD (arteriosclerotic cardiovascular disease)  I10 Hypertension, essential  E78.2 Mixed hyperlipidemia  Z87.891 Personal history of nicotine dependence  I77.89 Ascending aorta enlargement  I77.9 PAOD (peripheral arterial occlusive disease)  Z98.890 History of radiofrequency ablation (RFA) procedure for cardiac arrhythmia    ASCVD (ARTERIOSCLEROTIC CARDIOVASCULAR DISEASE):  - asymptomatic with an elevated calcium score and normal nuclear stress test in 2023  - Intensified management of HTN and HLD to reduce risk of heart attack and stroke.  - Emphasized the importance of controlling blood pressure, cholesterol, diet, exercise, weight, and smoking cessation in reducing cardiovascular risk.  - Discussed the significance of the coronary calcium score and its implications for heart health.  - Continued Aspirin 81 mg daily.  - Clarified the difference between urgent care and emergency department usage for cardiac symptoms.  - Continue current level of physical activity at work, maintain current diet of home-cooked meals, avoiding processed and fast foods.  - Ordered echocardiogram.  - Follow up in 6 months.  - Contact the office if experiencing any concerning symptoms such as chest  pain or shortness of breath.    HYPERTENSION, ESSENTIAL:  - Intensified management of HTN and HLD to reduce risk of heart attack and stroke.  - Emphasized the importance of controlling blood pressure, cholesterol, diet, exercise, weight, and smoking cessation in reducing cardiovascular risk.  - Begin checking blood pressure at home once every 3 days at different times, aiming for average below 130/80.  - Started Nebivolol 5 mg daily for blood pressure control.  - Continued Amlodipine 10 mg daily.  - Continued HCTZ 25 mg daily.  - Continued Losartan 100 mg daily.  - Message the doctor if home blood pressure readings remain above goal for potential medication adjustment.    MIXED HYPERLIPIDEMIA:  - Intensified management of HTN and HLD to reduce risk of heart attack and stroke.  - Emphasized the importance of controlling blood pressure, cholesterol, diet, exercise, weight, and smoking cessation in reducing cardiovascular risk.  - Started Zetia (ezetimibe) 10 mg daily to further reduce cholesterol.  - Continued Atorvastatin 80 mg daily.  - Will order lipid panel in 6 months.    PERSONAL HISTORY OF NICOTINE DEPENDENCE:  - Emphasized the importance of controlling blood pressure, cholesterol, diet, exercise, weight, and smoking cessation in reducing cardiovascular risk.  - Consider smoking cessation to significantly reduce cardiovascular risk.    ASCENDING AORTA ENLARGEMENT:  - followed by CTS, stable at 4.1 cm for years.  - Ordered echocardiogram.    PAOD (PERIPHERAL ARTERIAL OCCLUSIVE DISEASE):  - Ordered carotid duplex to evaluate recent episode of lightheadedness and dizziness.  - Ordered leg artery US for further evaluation of leg pain.  Not typical for PAD.        This note was generated with the assistance of ambient listening technology. Verbal consent was obtained by the patient and accompanying visitor(s) for the recording of patient appointment to facilitate this note. I attest to having reviewed and edited the  generated note for accuracy, though some syntax or spelling errors may persist. Please contact the author of this note for any clarification.      Follow up in about 6 months (around 12/11/2025).

## 2025-06-11 NOTE — ASSESSMENT & PLAN NOTE
Cardiology added Nebivolol 5 mg daily for blood pressure control.  - Continued Amlodipine 10 mg daily.  - Continued HCTZ 25 mg daily.  - Continued Losartan 100 mg daily.

## (undated) DEVICE — BANDAGE MATRIX HK LOOP 4IN 5YD

## (undated) DEVICE — STOCKINETTE TUBULAR 2PL 6 X 4

## (undated) DEVICE — PAD CAST SPECIALIST STRL 4

## (undated) DEVICE — TRAY MINOR ORTHO

## (undated) DEVICE — GLOVE BIOGEL PI MICRO SZ 7.5

## (undated) DEVICE — SUT ETHILON 3/0 18IN PS-1

## (undated) DEVICE — NDL HYPO REG 25G X 1 1/2

## (undated) DEVICE — SEE MEDLINE ITEM 157150

## (undated) DEVICE — BANDAGE ESMARK ELASTIC ST 4X9

## (undated) DEVICE — SPONGE GAUZE 16PLY 4X4

## (undated) DEVICE — UNDERGLOVES BIOGEL PI SIZE 8

## (undated) DEVICE — GAUZE SPONGE 4X4 12PLY

## (undated) DEVICE — SEE MEDLINE ITEM 146298

## (undated) DEVICE — GOWN POLY REINF BRTH SLV XL

## (undated) DEVICE — DRESSING XEROFORM FOIL PK 1X8